# Patient Record
Sex: MALE | Race: WHITE | Employment: OTHER | URBAN - METROPOLITAN AREA
[De-identification: names, ages, dates, MRNs, and addresses within clinical notes are randomized per-mention and may not be internally consistent; named-entity substitution may affect disease eponyms.]

---

## 2017-02-16 ENCOUNTER — HOSPITAL ENCOUNTER (OUTPATIENT)
Dept: PREADMISSION TESTING | Age: 62
Discharge: HOME | End: 2017-02-16
Payer: COMMERCIAL

## 2017-02-16 VITALS
RESPIRATION RATE: 16 BRPM | SYSTOLIC BLOOD PRESSURE: 115 MMHG | HEIGHT: 69 IN | TEMPERATURE: 97.6 F | BODY MASS INDEX: 42.65 KG/M2 | DIASTOLIC BLOOD PRESSURE: 73 MMHG | WEIGHT: 288 LBS | HEART RATE: 77 BPM | OXYGEN SATURATION: 95 %

## 2017-02-16 LAB
ANION GAP SERPL CALCULATED.3IONS-SCNC: 11 MMOL/L (ref 9–17)
BUN BLDV-MCNC: 14 MG/DL (ref 8–23)
CHLORIDE BLD-SCNC: 99 MMOL/L (ref 98–107)
CO2: 30 MMOL/L (ref 20–31)
CREAT SERPL-MCNC: 0.86 MG/DL (ref 0.7–1.2)
GFR AFRICAN AMERICAN: >60 ML/MIN
GFR NON-AFRICAN AMERICAN: >60 ML/MIN
GFR SERPL CREATININE-BSD FRML MDRD: NORMAL ML/MIN/{1.73_M2}
GFR SERPL CREATININE-BSD FRML MDRD: NORMAL ML/MIN/{1.73_M2}
GLUCOSE BLD-MCNC: 144 MG/DL (ref 70–99)
POTASSIUM SERPL-SCNC: 4.2 MMOL/L (ref 3.7–5.3)
SODIUM BLD-SCNC: 140 MMOL/L (ref 135–144)

## 2017-02-16 PROCEDURE — 93005 ELECTROCARDIOGRAM TRACING: CPT

## 2017-02-16 PROCEDURE — 84520 ASSAY OF UREA NITROGEN: CPT

## 2017-02-16 PROCEDURE — 36415 COLL VENOUS BLD VENIPUNCTURE: CPT

## 2017-02-16 PROCEDURE — 82947 ASSAY GLUCOSE BLOOD QUANT: CPT

## 2017-02-16 PROCEDURE — 80051 ELECTROLYTE PANEL: CPT

## 2017-02-16 PROCEDURE — 82565 ASSAY OF CREATININE: CPT

## 2017-02-16 RX ORDER — PREDNISOLONE ACETATE 10 MG/ML
1 SUSPENSION/ DROPS OPHTHALMIC NIGHTLY
COMMUNITY

## 2017-02-16 RX ORDER — SODIUM CHLORIDE, SODIUM LACTATE, POTASSIUM CHLORIDE, CALCIUM CHLORIDE 600; 310; 30; 20 MG/100ML; MG/100ML; MG/100ML; MG/100ML
1000 INJECTION, SOLUTION INTRAVENOUS CONTINUOUS
Status: CANCELLED | OUTPATIENT
Start: 2017-02-16

## 2017-02-16 ASSESSMENT — PAIN DESCRIPTION - DESCRIPTORS: DESCRIPTORS: BURNING;CONSTANT

## 2017-02-16 ASSESSMENT — PAIN DESCRIPTION - ONSET: ONSET: ON-GOING

## 2017-02-16 ASSESSMENT — PAIN DESCRIPTION - LOCATION: LOCATION: ANKLE

## 2017-02-16 ASSESSMENT — PAIN DESCRIPTION - PROGRESSION: CLINICAL_PROGRESSION: GRADUALLY WORSENING

## 2017-02-16 ASSESSMENT — PAIN DESCRIPTION - PAIN TYPE: TYPE: CHRONIC PAIN

## 2017-02-16 ASSESSMENT — PAIN DESCRIPTION - ORIENTATION: ORIENTATION: RIGHT

## 2017-02-16 ASSESSMENT — PAIN SCALES - GENERAL: PAINLEVEL_OUTOF10: 3

## 2017-02-17 LAB
EKG ATRIAL RATE: 74 BPM
EKG P AXIS: 63 DEGREES
EKG P-R INTERVAL: 168 MS
EKG Q-T INTERVAL: 384 MS
EKG QRS DURATION: 74 MS
EKG QTC CALCULATION (BAZETT): 426 MS
EKG R AXIS: 39 DEGREES
EKG T AXIS: 27 DEGREES
EKG VENTRICULAR RATE: 74 BPM

## 2017-02-23 ENCOUNTER — HOSPITAL ENCOUNTER (OUTPATIENT)
Age: 62
Setting detail: OUTPATIENT SURGERY
Discharge: HOME | End: 2017-02-23
Attending: ORTHOPAEDIC SURGERY | Admitting: ORTHOPAEDIC SURGERY
Payer: COMMERCIAL

## 2017-02-23 ENCOUNTER — ANESTHESIA (OUTPATIENT)
Dept: OPERATING ROOM | Age: 62
End: 2017-02-23
Payer: COMMERCIAL

## 2017-02-23 ENCOUNTER — ANESTHESIA EVENT (OUTPATIENT)
Dept: OPERATING ROOM | Age: 62
End: 2017-02-23
Payer: COMMERCIAL

## 2017-02-23 VITALS — SYSTOLIC BLOOD PRESSURE: 173 MMHG | DIASTOLIC BLOOD PRESSURE: 101 MMHG | OXYGEN SATURATION: 99 % | TEMPERATURE: 97.1 F

## 2017-02-23 VITALS
BODY MASS INDEX: 41.47 KG/M2 | HEIGHT: 69 IN | RESPIRATION RATE: 13 BRPM | TEMPERATURE: 97.5 F | WEIGHT: 280 LBS | SYSTOLIC BLOOD PRESSURE: 143 MMHG | DIASTOLIC BLOOD PRESSURE: 90 MMHG | HEART RATE: 84 BPM | OXYGEN SATURATION: 91 %

## 2017-02-23 LAB — POC POTASSIUM: 4.2 MMOL/L (ref 3.5–5.1)

## 2017-02-23 PROCEDURE — 3600000004 HC SURGERY LEVEL 4 BASE: Performed by: ORTHOPAEDIC SURGERY

## 2017-02-23 PROCEDURE — 6360000002 HC RX W HCPCS: Performed by: ORTHOPAEDIC SURGERY

## 2017-02-23 PROCEDURE — 2500000003 HC RX 250 WO HCPCS: Performed by: NURSE ANESTHETIST, CERTIFIED REGISTERED

## 2017-02-23 PROCEDURE — 7100000010 HC PHASE II RECOVERY - FIRST 15 MIN: Performed by: ORTHOPAEDIC SURGERY

## 2017-02-23 PROCEDURE — 6370000000 HC RX 637 (ALT 250 FOR IP): Performed by: ANESTHESIOLOGY

## 2017-02-23 PROCEDURE — 3700000001 HC ADD 15 MINUTES (ANESTHESIA): Performed by: ORTHOPAEDIC SURGERY

## 2017-02-23 PROCEDURE — 84132 ASSAY OF SERUM POTASSIUM: CPT

## 2017-02-23 PROCEDURE — 3600000014 HC SURGERY LEVEL 4 ADDTL 15MIN: Performed by: ORTHOPAEDIC SURGERY

## 2017-02-23 PROCEDURE — 2580000003 HC RX 258: Performed by: NURSE ANESTHETIST, CERTIFIED REGISTERED

## 2017-02-23 PROCEDURE — 3700000000 HC ANESTHESIA ATTENDED CARE: Performed by: ORTHOPAEDIC SURGERY

## 2017-02-23 PROCEDURE — 6360000002 HC RX W HCPCS: Performed by: NURSE ANESTHETIST, CERTIFIED REGISTERED

## 2017-02-23 PROCEDURE — 2500000003 HC RX 250 WO HCPCS: Performed by: ORTHOPAEDIC SURGERY

## 2017-02-23 PROCEDURE — 7100000001 HC PACU RECOVERY - ADDTL 15 MIN: Performed by: ORTHOPAEDIC SURGERY

## 2017-02-23 PROCEDURE — 2580000003 HC RX 258: Performed by: ORTHOPAEDIC SURGERY

## 2017-02-23 PROCEDURE — 7100000000 HC PACU RECOVERY - FIRST 15 MIN: Performed by: ORTHOPAEDIC SURGERY

## 2017-02-23 PROCEDURE — 2720000010 HC SURG SUPPLY STERILE: Performed by: ORTHOPAEDIC SURGERY

## 2017-02-23 RX ORDER — SODIUM CHLORIDE, SODIUM LACTATE, POTASSIUM CHLORIDE, CALCIUM CHLORIDE 600; 310; 30; 20 MG/100ML; MG/100ML; MG/100ML; MG/100ML
INJECTION, SOLUTION INTRAVENOUS CONTINUOUS
Status: DISCONTINUED | OUTPATIENT
Start: 2017-02-23 | End: 2017-02-23 | Stop reason: HOSPADM

## 2017-02-23 RX ORDER — ROCURONIUM BROMIDE 10 MG/ML
INJECTION, SOLUTION INTRAVENOUS PRN
Status: DISCONTINUED | OUTPATIENT
Start: 2017-02-23 | End: 2017-02-23 | Stop reason: SDUPTHER

## 2017-02-23 RX ORDER — ONDANSETRON 2 MG/ML
INJECTION INTRAMUSCULAR; INTRAVENOUS PRN
Status: DISCONTINUED | OUTPATIENT
Start: 2017-02-23 | End: 2017-02-23 | Stop reason: SDUPTHER

## 2017-02-23 RX ORDER — ONDANSETRON 2 MG/ML
4 INJECTION INTRAMUSCULAR; INTRAVENOUS
Status: DISCONTINUED | OUTPATIENT
Start: 2017-02-23 | End: 2017-02-23 | Stop reason: HOSPADM

## 2017-02-23 RX ORDER — GLYCOPYRROLATE 0.2 MG/ML
INJECTION INTRAMUSCULAR; INTRAVENOUS PRN
Status: DISCONTINUED | OUTPATIENT
Start: 2017-02-23 | End: 2017-02-23 | Stop reason: SDUPTHER

## 2017-02-23 RX ORDER — NEOSTIGMINE METHYLSULFATE 1 MG/ML
INJECTION, SOLUTION INTRAVENOUS PRN
Status: DISCONTINUED | OUTPATIENT
Start: 2017-02-23 | End: 2017-02-23 | Stop reason: SDUPTHER

## 2017-02-23 RX ORDER — SODIUM CHLORIDE, SODIUM LACTATE, POTASSIUM CHLORIDE, CALCIUM CHLORIDE 600; 310; 30; 20 MG/100ML; MG/100ML; MG/100ML; MG/100ML
1000 INJECTION, SOLUTION INTRAVENOUS CONTINUOUS
Status: DISCONTINUED | OUTPATIENT
Start: 2017-02-23 | End: 2017-02-23 | Stop reason: SDUPTHER

## 2017-02-23 RX ORDER — MEPERIDINE HYDROCHLORIDE 50 MG/ML
12.5 INJECTION INTRAMUSCULAR; INTRAVENOUS; SUBCUTANEOUS EVERY 5 MIN PRN
Status: DISCONTINUED | OUTPATIENT
Start: 2017-02-23 | End: 2017-02-23 | Stop reason: HOSPADM

## 2017-02-23 RX ORDER — OXYCODONE HYDROCHLORIDE AND ACETAMINOPHEN 5; 325 MG/1; MG/1
2 TABLET ORAL
Status: COMPLETED | OUTPATIENT
Start: 2017-02-23 | End: 2017-02-23

## 2017-02-23 RX ORDER — SODIUM CHLORIDE, SODIUM LACTATE, POTASSIUM CHLORIDE, CALCIUM CHLORIDE 600; 310; 30; 20 MG/100ML; MG/100ML; MG/100ML; MG/100ML
INJECTION, SOLUTION INTRAVENOUS CONTINUOUS PRN
Status: DISCONTINUED | OUTPATIENT
Start: 2017-02-23 | End: 2017-02-23 | Stop reason: SDUPTHER

## 2017-02-23 RX ORDER — FENTANYL CITRATE 50 UG/ML
INJECTION, SOLUTION INTRAMUSCULAR; INTRAVENOUS PRN
Status: DISCONTINUED | OUTPATIENT
Start: 2017-02-23 | End: 2017-02-23 | Stop reason: SDUPTHER

## 2017-02-23 RX ORDER — FENTANYL CITRATE 50 UG/ML
25 INJECTION, SOLUTION INTRAMUSCULAR; INTRAVENOUS EVERY 5 MIN PRN
Status: DISCONTINUED | OUTPATIENT
Start: 2017-02-23 | End: 2017-02-23 | Stop reason: HOSPADM

## 2017-02-23 RX ORDER — MIDAZOLAM HYDROCHLORIDE 1 MG/ML
INJECTION INTRAMUSCULAR; INTRAVENOUS PRN
Status: DISCONTINUED | OUTPATIENT
Start: 2017-02-23 | End: 2017-02-23 | Stop reason: SDUPTHER

## 2017-02-23 RX ORDER — LIDOCAINE HYDROCHLORIDE 10 MG/ML
INJECTION, SOLUTION EPIDURAL; INFILTRATION; INTRACAUDAL; PERINEURAL PRN
Status: DISCONTINUED | OUTPATIENT
Start: 2017-02-23 | End: 2017-02-23 | Stop reason: SDUPTHER

## 2017-02-23 RX ORDER — MAGNESIUM HYDROXIDE 1200 MG/15ML
LIQUID ORAL CONTINUOUS PRN
Status: DISCONTINUED | OUTPATIENT
Start: 2017-02-23 | End: 2017-02-23 | Stop reason: HOSPADM

## 2017-02-23 RX ORDER — FENTANYL CITRATE 50 UG/ML
50 INJECTION, SOLUTION INTRAMUSCULAR; INTRAVENOUS EVERY 5 MIN PRN
Status: DISCONTINUED | OUTPATIENT
Start: 2017-02-23 | End: 2017-02-23 | Stop reason: HOSPADM

## 2017-02-23 RX ORDER — PROPOFOL 10 MG/ML
INJECTION, EMULSION INTRAVENOUS PRN
Status: DISCONTINUED | OUTPATIENT
Start: 2017-02-23 | End: 2017-02-23 | Stop reason: SDUPTHER

## 2017-02-23 RX ORDER — BUPIVACAINE HYDROCHLORIDE 5 MG/ML
INJECTION, SOLUTION PERINEURAL PRN
Status: DISCONTINUED | OUTPATIENT
Start: 2017-02-23 | End: 2017-02-23 | Stop reason: HOSPADM

## 2017-02-23 RX ORDER — OXYCODONE HYDROCHLORIDE AND ACETAMINOPHEN 5; 325 MG/1; MG/1
TABLET ORAL
Qty: 70 TABLET | Refills: 0 | Status: SHIPPED | OUTPATIENT
Start: 2017-02-23 | End: 2017-08-02

## 2017-02-23 RX ADMIN — ONDANSETRON 4 MG: 2 INJECTION, SOLUTION INTRAMUSCULAR; INTRAVENOUS at 13:01

## 2017-02-23 RX ADMIN — SODIUM CHLORIDE, POTASSIUM CHLORIDE, SODIUM LACTATE AND CALCIUM CHLORIDE: 600; 310; 30; 20 INJECTION, SOLUTION INTRAVENOUS at 10:59

## 2017-02-23 RX ADMIN — Medication 3 G: at 11:52

## 2017-02-23 RX ADMIN — OXYCODONE HYDROCHLORIDE AND ACETAMINOPHEN 2 TABLET: 5; 325 TABLET ORAL at 14:53

## 2017-02-23 RX ADMIN — NEOSTIGMINE METHYLSULFATE 3 MG: 1 INJECTION INTRAVENOUS at 13:01

## 2017-02-23 RX ADMIN — ROCURONIUM BROMIDE 50 MG: 10 INJECTION INTRAVENOUS at 11:48

## 2017-02-23 RX ADMIN — MIDAZOLAM HYDROCHLORIDE 2 MG: 1 INJECTION, SOLUTION INTRAMUSCULAR; INTRAVENOUS at 11:46

## 2017-02-23 RX ADMIN — LIDOCAINE HYDROCHLORIDE 50 MG: 10 INJECTION, SOLUTION EPIDURAL; INFILTRATION; INTRACAUDAL; PERINEURAL at 11:48

## 2017-02-23 RX ADMIN — GLYCOPYRROLATE 0.6 MG: 0.2 INJECTION INTRAMUSCULAR; INTRAVENOUS at 13:01

## 2017-02-23 RX ADMIN — SODIUM CHLORIDE, POTASSIUM CHLORIDE, SODIUM LACTATE AND CALCIUM CHLORIDE: 600; 310; 30; 20 INJECTION, SOLUTION INTRAVENOUS at 11:45

## 2017-02-23 RX ADMIN — PROPOFOL 200 MG: 10 INJECTION, EMULSION INTRAVENOUS at 11:48

## 2017-02-23 RX ADMIN — FENTANYL CITRATE 100 MCG: 50 INJECTION, SOLUTION INTRAMUSCULAR; INTRAVENOUS at 11:48

## 2017-02-23 ASSESSMENT — PAIN SCALES - GENERAL
PAINLEVEL_OUTOF10: 0
PAINLEVEL_OUTOF10: 0
PAINLEVEL_OUTOF10: 2
PAINLEVEL_OUTOF10: 3
PAINLEVEL_OUTOF10: 5

## 2017-02-23 ASSESSMENT — PAIN DESCRIPTION - PAIN TYPE: TYPE: SURGICAL PAIN

## 2017-02-23 ASSESSMENT — PAIN DESCRIPTION - ORIENTATION: ORIENTATION: RIGHT

## 2017-02-23 ASSESSMENT — PAIN DESCRIPTION - LOCATION: LOCATION: ANKLE

## 2017-02-23 ASSESSMENT — PAIN - FUNCTIONAL ASSESSMENT: PAIN_FUNCTIONAL_ASSESSMENT: 0-10

## 2017-03-08 ENCOUNTER — HOSPITAL ENCOUNTER (OUTPATIENT)
Dept: PHYSICAL THERAPY | Age: 62
Setting detail: THERAPIES SERIES
Discharge: HOME | End: 2017-03-08
Payer: COMMERCIAL

## 2017-03-08 PROCEDURE — 97110 THERAPEUTIC EXERCISES: CPT

## 2017-03-08 PROCEDURE — 97162 PT EVAL MOD COMPLEX 30 MIN: CPT

## 2017-03-08 ASSESSMENT — PAIN DESCRIPTION - LOCATION: LOCATION: ANKLE

## 2017-03-08 ASSESSMENT — PAIN DESCRIPTION - FREQUENCY: FREQUENCY: CONTINUOUS

## 2017-03-08 ASSESSMENT — PAIN DESCRIPTION - DESCRIPTORS: DESCRIPTORS: ACHING;DULL;CONSTANT

## 2017-03-08 ASSESSMENT — PAIN DESCRIPTION - ORIENTATION: ORIENTATION: RIGHT

## 2017-03-08 ASSESSMENT — PAIN SCALES - GENERAL: PAINLEVEL_OUTOF10: 7

## 2017-03-15 ENCOUNTER — HOSPITAL ENCOUNTER (OUTPATIENT)
Dept: PHYSICAL THERAPY | Age: 62
Setting detail: THERAPIES SERIES
Discharge: HOME | End: 2017-03-15
Payer: COMMERCIAL

## 2017-03-15 PROCEDURE — 97110 THERAPEUTIC EXERCISES: CPT

## 2017-03-15 ASSESSMENT — PAIN DESCRIPTION - FREQUENCY: FREQUENCY: CONTINUOUS

## 2017-03-15 ASSESSMENT — PAIN DESCRIPTION - LOCATION: LOCATION: ANKLE

## 2017-03-15 ASSESSMENT — PAIN DESCRIPTION - ORIENTATION: ORIENTATION: RIGHT

## 2017-03-15 ASSESSMENT — PAIN DESCRIPTION - DESCRIPTORS: DESCRIPTORS: ACHING;CONSTANT;DULL

## 2017-03-15 ASSESSMENT — PAIN SCALES - GENERAL: PAINLEVEL_OUTOF10: 3

## 2017-03-16 ENCOUNTER — HOSPITAL ENCOUNTER (OUTPATIENT)
Dept: PHYSICAL THERAPY | Age: 62
Setting detail: THERAPIES SERIES
Discharge: HOME | End: 2017-03-16
Payer: COMMERCIAL

## 2017-03-16 PROCEDURE — 97110 THERAPEUTIC EXERCISES: CPT

## 2017-03-16 ASSESSMENT — PAIN DESCRIPTION - LOCATION: LOCATION: ANKLE

## 2017-03-16 ASSESSMENT — PAIN DESCRIPTION - DESCRIPTORS: DESCRIPTORS: ACHING;CONSTANT;DULL

## 2017-03-16 ASSESSMENT — PAIN SCALES - GENERAL: PAINLEVEL_OUTOF10: 3

## 2017-03-16 ASSESSMENT — PAIN DESCRIPTION - FREQUENCY: FREQUENCY: CONTINUOUS

## 2017-03-16 ASSESSMENT — PAIN DESCRIPTION - ORIENTATION: ORIENTATION: RIGHT

## 2017-03-21 ENCOUNTER — HOSPITAL ENCOUNTER (OUTPATIENT)
Dept: PHYSICAL THERAPY | Age: 62
Setting detail: THERAPIES SERIES
Discharge: HOME | End: 2017-03-21
Payer: COMMERCIAL

## 2017-03-21 PROCEDURE — 97110 THERAPEUTIC EXERCISES: CPT

## 2017-03-21 ASSESSMENT — PAIN DESCRIPTION - FREQUENCY: FREQUENCY: CONTINUOUS

## 2017-03-21 ASSESSMENT — PAIN DESCRIPTION - LOCATION: LOCATION: ANKLE

## 2017-03-21 ASSESSMENT — PAIN DESCRIPTION - ORIENTATION: ORIENTATION: RIGHT

## 2017-03-21 ASSESSMENT — PAIN DESCRIPTION - DESCRIPTORS: DESCRIPTORS: ACHING;DULL;CONSTANT

## 2017-03-21 ASSESSMENT — PAIN SCALES - GENERAL: PAINLEVEL_OUTOF10: 3

## 2017-03-22 ENCOUNTER — HOSPITAL ENCOUNTER (OUTPATIENT)
Dept: PHYSICAL THERAPY | Age: 62
Setting detail: THERAPIES SERIES
Discharge: HOME | End: 2017-03-22
Payer: COMMERCIAL

## 2017-03-22 PROCEDURE — 97110 THERAPEUTIC EXERCISES: CPT

## 2017-03-22 ASSESSMENT — PAIN DESCRIPTION - LOCATION: LOCATION: ANKLE

## 2017-03-22 ASSESSMENT — PAIN DESCRIPTION - ORIENTATION: ORIENTATION: RIGHT

## 2017-03-22 ASSESSMENT — PAIN DESCRIPTION - DESCRIPTORS: DESCRIPTORS: ACHING;DULL;CONSTANT

## 2017-03-22 ASSESSMENT — PAIN DESCRIPTION - FREQUENCY: FREQUENCY: CONTINUOUS

## 2017-03-24 ENCOUNTER — HOSPITAL ENCOUNTER (OUTPATIENT)
Dept: PHYSICAL THERAPY | Age: 62
Setting detail: THERAPIES SERIES
Discharge: HOME | End: 2017-03-24
Payer: COMMERCIAL

## 2017-03-24 PROCEDURE — 97110 THERAPEUTIC EXERCISES: CPT

## 2017-03-24 PROCEDURE — 97164 PT RE-EVAL EST PLAN CARE: CPT

## 2017-03-24 ASSESSMENT — PAIN DESCRIPTION - DESCRIPTORS: DESCRIPTORS: ACHING;CONSTANT

## 2017-03-24 ASSESSMENT — PAIN DESCRIPTION - FREQUENCY: FREQUENCY: CONTINUOUS

## 2017-03-24 ASSESSMENT — PAIN DESCRIPTION - LOCATION: LOCATION: ANKLE

## 2017-03-24 ASSESSMENT — PAIN DESCRIPTION - ORIENTATION: ORIENTATION: RIGHT

## 2017-03-24 ASSESSMENT — PAIN SCALES - GENERAL: PAINLEVEL_OUTOF10: 3

## 2017-03-27 ENCOUNTER — HOSPITAL ENCOUNTER (OUTPATIENT)
Dept: PHYSICAL THERAPY | Age: 62
Setting detail: THERAPIES SERIES
Discharge: HOME | End: 2017-03-27
Payer: COMMERCIAL

## 2017-03-27 PROCEDURE — 97110 THERAPEUTIC EXERCISES: CPT

## 2017-03-27 ASSESSMENT — PAIN DESCRIPTION - ORIENTATION: ORIENTATION: RIGHT

## 2017-03-27 ASSESSMENT — PAIN SCALES - GENERAL: PAINLEVEL_OUTOF10: 2

## 2017-03-27 ASSESSMENT — PAIN DESCRIPTION - FREQUENCY: FREQUENCY: CONTINUOUS

## 2017-03-27 ASSESSMENT — PAIN DESCRIPTION - LOCATION: LOCATION: ANKLE

## 2017-03-27 ASSESSMENT — PAIN DESCRIPTION - DESCRIPTORS: DESCRIPTORS: ACHING;CONSTANT

## 2017-03-29 ENCOUNTER — HOSPITAL ENCOUNTER (OUTPATIENT)
Dept: PHYSICAL THERAPY | Age: 62
Setting detail: THERAPIES SERIES
Discharge: HOME | End: 2017-03-29
Payer: COMMERCIAL

## 2017-03-29 PROCEDURE — 97110 THERAPEUTIC EXERCISES: CPT

## 2017-03-29 ASSESSMENT — PAIN SCALES - GENERAL: PAINLEVEL_OUTOF10: 3

## 2017-03-29 ASSESSMENT — PAIN DESCRIPTION - DESCRIPTORS: DESCRIPTORS: ACHING;DULL;CONSTANT

## 2017-03-29 ASSESSMENT — PAIN DESCRIPTION - FREQUENCY: FREQUENCY: CONTINUOUS

## 2017-03-29 ASSESSMENT — PAIN DESCRIPTION - ORIENTATION: ORIENTATION: RIGHT

## 2017-03-29 ASSESSMENT — PAIN DESCRIPTION - LOCATION: LOCATION: ANKLE

## 2017-04-04 ENCOUNTER — HOSPITAL ENCOUNTER (OUTPATIENT)
Dept: PHYSICAL THERAPY | Age: 62
Setting detail: THERAPIES SERIES
Discharge: HOME | End: 2017-04-04
Payer: COMMERCIAL

## 2017-04-04 PROCEDURE — 97110 THERAPEUTIC EXERCISES: CPT

## 2017-04-04 ASSESSMENT — PAIN DESCRIPTION - DESCRIPTORS: DESCRIPTORS: ACHING;DULL;CONSTANT

## 2017-04-04 ASSESSMENT — PAIN SCALES - GENERAL: PAINLEVEL_OUTOF10: 2

## 2017-04-04 ASSESSMENT — PAIN DESCRIPTION - ORIENTATION: ORIENTATION: RIGHT

## 2017-04-04 ASSESSMENT — PAIN DESCRIPTION - LOCATION: LOCATION: ANKLE

## 2017-04-04 ASSESSMENT — PAIN DESCRIPTION - FREQUENCY: FREQUENCY: CONTINUOUS

## 2017-04-06 ENCOUNTER — HOSPITAL ENCOUNTER (OUTPATIENT)
Dept: PHYSICAL THERAPY | Age: 62
Setting detail: THERAPIES SERIES
Discharge: HOME | End: 2017-04-06
Payer: COMMERCIAL

## 2017-04-06 PROCEDURE — 97110 THERAPEUTIC EXERCISES: CPT

## 2017-04-06 ASSESSMENT — PAIN SCALES - GENERAL: PAINLEVEL_OUTOF10: 2

## 2017-04-06 ASSESSMENT — PAIN DESCRIPTION - LOCATION: LOCATION: ANKLE

## 2017-04-06 ASSESSMENT — PAIN DESCRIPTION - FREQUENCY: FREQUENCY: CONTINUOUS

## 2017-04-06 ASSESSMENT — PAIN DESCRIPTION - ORIENTATION: ORIENTATION: RIGHT

## 2017-04-06 ASSESSMENT — PAIN DESCRIPTION - DESCRIPTORS: DESCRIPTORS: ACHING;DULL;CONSTANT

## 2017-04-10 ENCOUNTER — HOSPITAL ENCOUNTER (OUTPATIENT)
Dept: PHYSICAL THERAPY | Age: 62
Setting detail: THERAPIES SERIES
Discharge: HOME | End: 2017-04-10
Payer: COMMERCIAL

## 2017-04-10 PROCEDURE — 97110 THERAPEUTIC EXERCISES: CPT

## 2017-04-10 ASSESSMENT — PAIN DESCRIPTION - FREQUENCY: FREQUENCY: CONTINUOUS

## 2017-04-10 ASSESSMENT — PAIN SCALES - GENERAL: PAINLEVEL_OUTOF10: 3

## 2017-04-10 ASSESSMENT — PAIN DESCRIPTION - DESCRIPTORS: DESCRIPTORS: ACHING;DULL;CONSTANT

## 2017-04-10 ASSESSMENT — PAIN DESCRIPTION - LOCATION: LOCATION: ANKLE

## 2017-04-10 ASSESSMENT — PAIN DESCRIPTION - ORIENTATION: ORIENTATION: RIGHT

## 2017-04-12 ENCOUNTER — HOSPITAL ENCOUNTER (OUTPATIENT)
Dept: PHYSICAL THERAPY | Age: 62
Setting detail: THERAPIES SERIES
Discharge: HOME | End: 2017-04-12
Payer: COMMERCIAL

## 2017-04-12 PROCEDURE — 97110 THERAPEUTIC EXERCISES: CPT

## 2017-04-12 ASSESSMENT — PAIN DESCRIPTION - LOCATION: LOCATION: ANKLE

## 2017-04-12 ASSESSMENT — PAIN SCALES - GENERAL: PAINLEVEL_OUTOF10: 2

## 2017-04-12 ASSESSMENT — PAIN DESCRIPTION - ORIENTATION: ORIENTATION: RIGHT

## 2017-04-12 ASSESSMENT — PAIN DESCRIPTION - FREQUENCY: FREQUENCY: CONTINUOUS

## 2017-04-12 ASSESSMENT — PAIN DESCRIPTION - DESCRIPTORS: DESCRIPTORS: ACHING;CONSTANT;DULL

## 2017-04-13 ENCOUNTER — HOSPITAL ENCOUNTER (OUTPATIENT)
Dept: PHYSICAL THERAPY | Age: 62
Setting detail: THERAPIES SERIES
Discharge: HOME | End: 2017-04-13
Payer: COMMERCIAL

## 2017-04-13 PROCEDURE — 97110 THERAPEUTIC EXERCISES: CPT

## 2017-04-13 ASSESSMENT — PAIN DESCRIPTION - ORIENTATION: ORIENTATION: RIGHT

## 2017-04-13 ASSESSMENT — PAIN DESCRIPTION - LOCATION: LOCATION: ANKLE

## 2017-04-13 ASSESSMENT — PAIN DESCRIPTION - FREQUENCY: FREQUENCY: CONTINUOUS

## 2017-04-13 ASSESSMENT — PAIN DESCRIPTION - DESCRIPTORS: DESCRIPTORS: ACHING;DULL;CONSTANT

## 2017-04-13 ASSESSMENT — PAIN SCALES - GENERAL: PAINLEVEL_OUTOF10: 2

## 2017-04-17 ENCOUNTER — HOSPITAL ENCOUNTER (OUTPATIENT)
Dept: PHYSICAL THERAPY | Age: 62
Setting detail: THERAPIES SERIES
Discharge: HOME | End: 2017-04-17
Payer: COMMERCIAL

## 2017-04-17 PROCEDURE — 97110 THERAPEUTIC EXERCISES: CPT

## 2017-04-17 ASSESSMENT — PAIN DESCRIPTION - DESCRIPTORS: DESCRIPTORS: ACHING;DULL;CONSTANT

## 2017-04-17 ASSESSMENT — PAIN SCALES - GENERAL: PAINLEVEL_OUTOF10: 1

## 2017-04-17 ASSESSMENT — PAIN DESCRIPTION - ORIENTATION: ORIENTATION: RIGHT

## 2017-04-17 ASSESSMENT — PAIN DESCRIPTION - LOCATION: LOCATION: ANKLE

## 2017-04-17 ASSESSMENT — PAIN DESCRIPTION - FREQUENCY: FREQUENCY: CONTINUOUS

## 2017-04-19 ENCOUNTER — HOSPITAL ENCOUNTER (OUTPATIENT)
Dept: PHYSICAL THERAPY | Age: 62
Setting detail: THERAPIES SERIES
Discharge: HOME | End: 2017-04-19
Payer: COMMERCIAL

## 2017-04-19 PROCEDURE — 97110 THERAPEUTIC EXERCISES: CPT

## 2017-04-19 ASSESSMENT — PAIN DESCRIPTION - ORIENTATION: ORIENTATION: RIGHT

## 2017-04-19 ASSESSMENT — PAIN DESCRIPTION - FREQUENCY: FREQUENCY: CONTINUOUS

## 2017-04-19 ASSESSMENT — PAIN SCALES - GENERAL: PAINLEVEL_OUTOF10: 2

## 2017-04-19 ASSESSMENT — PAIN DESCRIPTION - LOCATION: LOCATION: ANKLE

## 2017-04-19 ASSESSMENT — PAIN DESCRIPTION - DESCRIPTORS: DESCRIPTORS: ACHING;DULL;CONSTANT

## 2017-04-21 ENCOUNTER — HOSPITAL ENCOUNTER (OUTPATIENT)
Dept: PHYSICAL THERAPY | Age: 62
Setting detail: THERAPIES SERIES
Discharge: HOME | End: 2017-04-21
Payer: COMMERCIAL

## 2017-04-21 PROCEDURE — 97110 THERAPEUTIC EXERCISES: CPT

## 2017-04-21 ASSESSMENT — PAIN DESCRIPTION - DESCRIPTORS: DESCRIPTORS: ACHING;DULL;CONSTANT

## 2017-04-21 ASSESSMENT — PAIN SCALES - GENERAL: PAINLEVEL_OUTOF10: 2

## 2017-04-21 ASSESSMENT — PAIN DESCRIPTION - FREQUENCY: FREQUENCY: CONTINUOUS

## 2017-04-21 ASSESSMENT — PAIN DESCRIPTION - LOCATION: LOCATION: ANKLE

## 2017-04-21 ASSESSMENT — PAIN DESCRIPTION - ORIENTATION: ORIENTATION: RIGHT

## 2017-04-24 ENCOUNTER — HOSPITAL ENCOUNTER (OUTPATIENT)
Dept: PHYSICAL THERAPY | Age: 62
Setting detail: THERAPIES SERIES
Discharge: HOME | End: 2017-04-24
Payer: COMMERCIAL

## 2017-04-24 PROCEDURE — 97110 THERAPEUTIC EXERCISES: CPT

## 2017-04-24 ASSESSMENT — PAIN DESCRIPTION - ORIENTATION: ORIENTATION: RIGHT

## 2017-04-24 ASSESSMENT — PAIN DESCRIPTION - FREQUENCY: FREQUENCY: CONTINUOUS

## 2017-04-24 ASSESSMENT — PAIN DESCRIPTION - LOCATION: LOCATION: ANKLE

## 2017-04-24 ASSESSMENT — PAIN DESCRIPTION - DESCRIPTORS: DESCRIPTORS: ACHING;DULL;CONSTANT

## 2017-04-24 ASSESSMENT — PAIN SCALES - GENERAL: PAINLEVEL_OUTOF10: 2

## 2017-04-25 ENCOUNTER — APPOINTMENT (OUTPATIENT)
Dept: PHYSICAL THERAPY | Age: 62
End: 2017-04-25
Payer: COMMERCIAL

## 2017-04-26 ENCOUNTER — HOSPITAL ENCOUNTER (OUTPATIENT)
Dept: PHYSICAL THERAPY | Age: 62
Setting detail: THERAPIES SERIES
Discharge: HOME | End: 2017-04-26
Payer: COMMERCIAL

## 2017-04-26 PROCEDURE — 97110 THERAPEUTIC EXERCISES: CPT

## 2017-04-26 ASSESSMENT — PAIN DESCRIPTION - DESCRIPTORS: DESCRIPTORS: ACHING;DULL;CONSTANT

## 2017-04-26 ASSESSMENT — PAIN DESCRIPTION - ORIENTATION: ORIENTATION: RIGHT

## 2017-04-26 ASSESSMENT — PAIN DESCRIPTION - FREQUENCY: FREQUENCY: CONTINUOUS

## 2017-04-26 ASSESSMENT — PAIN SCALES - GENERAL: PAINLEVEL_OUTOF10: 4

## 2017-04-26 ASSESSMENT — PAIN DESCRIPTION - LOCATION: LOCATION: ANKLE

## 2017-05-02 ENCOUNTER — HOSPITAL ENCOUNTER (OUTPATIENT)
Dept: PHYSICAL THERAPY | Age: 62
Setting detail: THERAPIES SERIES
Discharge: HOME | End: 2017-05-02
Payer: COMMERCIAL

## 2017-05-02 PROCEDURE — 97110 THERAPEUTIC EXERCISES: CPT

## 2017-05-02 ASSESSMENT — PAIN DESCRIPTION - DESCRIPTORS: DESCRIPTORS: ACHING;DULL;CONSTANT

## 2017-05-02 ASSESSMENT — PAIN DESCRIPTION - LOCATION: LOCATION: ANKLE

## 2017-05-02 ASSESSMENT — PAIN SCALES - GENERAL: PAINLEVEL_OUTOF10: 2

## 2017-05-02 ASSESSMENT — PAIN DESCRIPTION - ORIENTATION: ORIENTATION: RIGHT

## 2017-05-02 ASSESSMENT — PAIN DESCRIPTION - FREQUENCY: FREQUENCY: CONTINUOUS

## 2017-05-04 ENCOUNTER — HOSPITAL ENCOUNTER (OUTPATIENT)
Dept: PHYSICAL THERAPY | Age: 62
Setting detail: THERAPIES SERIES
Discharge: HOME | End: 2017-05-04
Payer: COMMERCIAL

## 2017-05-04 PROCEDURE — 97110 THERAPEUTIC EXERCISES: CPT

## 2017-05-04 ASSESSMENT — PAIN DESCRIPTION - DESCRIPTORS: DESCRIPTORS: ACHING;DULL;CONSTANT

## 2017-05-04 ASSESSMENT — PAIN DESCRIPTION - ORIENTATION: ORIENTATION: RIGHT

## 2017-05-04 ASSESSMENT — PAIN DESCRIPTION - FREQUENCY: FREQUENCY: CONTINUOUS

## 2017-05-04 ASSESSMENT — PAIN SCALES - GENERAL: PAINLEVEL_OUTOF10: 2

## 2017-05-04 ASSESSMENT — PAIN DESCRIPTION - LOCATION: LOCATION: ANKLE

## 2017-05-05 ENCOUNTER — HOSPITAL ENCOUNTER (OUTPATIENT)
Dept: PHYSICAL THERAPY | Age: 62
Setting detail: THERAPIES SERIES
Discharge: HOME | End: 2017-05-05
Payer: COMMERCIAL

## 2017-05-05 PROCEDURE — 97110 THERAPEUTIC EXERCISES: CPT

## 2017-05-05 ASSESSMENT — PAIN DESCRIPTION - LOCATION: LOCATION: ANKLE

## 2017-05-05 ASSESSMENT — PAIN DESCRIPTION - DESCRIPTORS: DESCRIPTORS: ACHING;DULL;CONSTANT

## 2017-05-05 ASSESSMENT — PAIN DESCRIPTION - FREQUENCY: FREQUENCY: CONTINUOUS

## 2017-05-05 ASSESSMENT — PAIN SCALES - GENERAL: PAINLEVEL_OUTOF10: 3

## 2017-05-05 ASSESSMENT — PAIN DESCRIPTION - ORIENTATION: ORIENTATION: RIGHT

## 2017-05-10 ENCOUNTER — HOSPITAL ENCOUNTER (OUTPATIENT)
Dept: PHYSICAL THERAPY | Age: 62
Setting detail: THERAPIES SERIES
Discharge: HOME | End: 2017-05-10
Payer: COMMERCIAL

## 2017-05-10 PROCEDURE — 97110 THERAPEUTIC EXERCISES: CPT

## 2017-05-10 PROCEDURE — 97164 PT RE-EVAL EST PLAN CARE: CPT

## 2017-05-10 ASSESSMENT — PAIN DESCRIPTION - LOCATION: LOCATION: ANKLE

## 2017-05-10 ASSESSMENT — PAIN DESCRIPTION - FREQUENCY: FREQUENCY: CONTINUOUS

## 2017-05-10 ASSESSMENT — PAIN DESCRIPTION - ORIENTATION: ORIENTATION: RIGHT

## 2017-05-10 ASSESSMENT — PAIN DESCRIPTION - DESCRIPTORS: DESCRIPTORS: ACHING;DULL;CONSTANT

## 2017-05-10 ASSESSMENT — PAIN SCALES - GENERAL: PAINLEVEL_OUTOF10: 2

## 2017-05-12 ENCOUNTER — HOSPITAL ENCOUNTER (OUTPATIENT)
Dept: PHYSICAL THERAPY | Age: 62
Setting detail: THERAPIES SERIES
Discharge: HOME | End: 2017-05-12
Payer: COMMERCIAL

## 2017-05-12 PROCEDURE — 97110 THERAPEUTIC EXERCISES: CPT

## 2017-05-12 ASSESSMENT — PAIN DESCRIPTION - ORIENTATION: ORIENTATION: RIGHT

## 2017-05-12 ASSESSMENT — PAIN DESCRIPTION - FREQUENCY: FREQUENCY: CONTINUOUS

## 2017-05-12 ASSESSMENT — PAIN SCALES - GENERAL: PAINLEVEL_OUTOF10: 3

## 2017-05-12 ASSESSMENT — PAIN DESCRIPTION - DESCRIPTORS: DESCRIPTORS: ACHING;DULL;CONSTANT

## 2017-05-12 ASSESSMENT — PAIN DESCRIPTION - LOCATION: LOCATION: ANKLE

## 2017-05-15 ENCOUNTER — HOSPITAL ENCOUNTER (OUTPATIENT)
Dept: PHYSICAL THERAPY | Age: 62
Setting detail: THERAPIES SERIES
Discharge: HOME | End: 2017-05-15
Payer: COMMERCIAL

## 2017-05-15 PROCEDURE — 97110 THERAPEUTIC EXERCISES: CPT

## 2017-05-15 ASSESSMENT — PAIN DESCRIPTION - LOCATION: LOCATION: ANKLE

## 2017-05-15 ASSESSMENT — PAIN DESCRIPTION - FREQUENCY: FREQUENCY: CONTINUOUS

## 2017-05-15 ASSESSMENT — PAIN DESCRIPTION - ORIENTATION: ORIENTATION: RIGHT

## 2017-05-15 ASSESSMENT — PAIN DESCRIPTION - DESCRIPTORS: DESCRIPTORS: ACHING;DULL;CONSTANT

## 2017-05-15 ASSESSMENT — PAIN SCALES - GENERAL: PAINLEVEL_OUTOF10: 3

## 2017-08-11 PROBLEM — E11.9 CONTROLLED TYPE 2 DIABETES MELLITUS WITHOUT COMPLICATION, WITHOUT LONG-TERM CURRENT USE OF INSULIN (HCC): Status: ACTIVE | Noted: 2017-08-11

## 2017-08-11 PROBLEM — E11.9 CONTROLLED TYPE 2 DIABETES MELLITUS WITHOUT COMPLICATION, WITHOUT LONG-TERM CURRENT USE OF INSULIN (HCC): Chronic | Status: ACTIVE | Noted: 2017-08-11

## 2017-09-07 PROBLEM — S22.39XA CLOSED FRACTURE OF ONE RIB: Status: ACTIVE | Noted: 2017-09-07

## 2018-06-25 ENCOUNTER — OFFICE VISIT (OUTPATIENT)
Dept: ORTHOPEDIC SURGERY | Age: 63
End: 2018-06-25
Payer: COMMERCIAL

## 2018-06-25 VITALS
HEART RATE: 78 BPM | BODY MASS INDEX: 41.32 KG/M2 | SYSTOLIC BLOOD PRESSURE: 109 MMHG | DIASTOLIC BLOOD PRESSURE: 70 MMHG | WEIGHT: 279 LBS | HEIGHT: 69 IN

## 2018-06-25 DIAGNOSIS — M19.071 ARTHRITIS OF RIGHT ANKLE: Primary | ICD-10-CM

## 2018-06-25 PROCEDURE — 99213 OFFICE O/P EST LOW 20 MIN: CPT | Performed by: ORTHOPAEDIC SURGERY

## 2018-11-05 ENCOUNTER — OFFICE VISIT (OUTPATIENT)
Dept: ORTHOPEDIC SURGERY | Age: 63
End: 2018-11-05
Payer: COMMERCIAL

## 2018-11-05 VITALS
HEART RATE: 76 BPM | HEIGHT: 69 IN | SYSTOLIC BLOOD PRESSURE: 138 MMHG | WEIGHT: 286.4 LBS | BODY MASS INDEX: 42.42 KG/M2 | DIASTOLIC BLOOD PRESSURE: 90 MMHG

## 2018-11-05 DIAGNOSIS — M17.12 ARTHRITIS OF LEFT KNEE: Primary | ICD-10-CM

## 2018-11-05 DIAGNOSIS — M25.562 LEFT KNEE PAIN, UNSPECIFIED CHRONICITY: ICD-10-CM

## 2018-11-05 PROBLEM — Z95.0 PRESENCE OF CARDIAC PACEMAKER: Status: ACTIVE | Noted: 2017-01-16

## 2018-11-05 PROBLEM — G47.33 OSA (OBSTRUCTIVE SLEEP APNEA): Status: ACTIVE | Noted: 2018-09-14

## 2018-11-05 PROCEDURE — 3017F COLORECTAL CA SCREEN DOC REV: CPT | Performed by: ORTHOPAEDIC SURGERY

## 2018-11-05 PROCEDURE — G8417 CALC BMI ABV UP PARAM F/U: HCPCS | Performed by: ORTHOPAEDIC SURGERY

## 2018-11-05 PROCEDURE — 20610 DRAIN/INJ JOINT/BURSA W/O US: CPT | Performed by: ORTHOPAEDIC SURGERY

## 2018-11-05 PROCEDURE — 99213 OFFICE O/P EST LOW 20 MIN: CPT | Performed by: ORTHOPAEDIC SURGERY

## 2018-11-05 PROCEDURE — G8484 FLU IMMUNIZE NO ADMIN: HCPCS | Performed by: ORTHOPAEDIC SURGERY

## 2018-11-05 PROCEDURE — G8427 DOCREV CUR MEDS BY ELIG CLIN: HCPCS | Performed by: ORTHOPAEDIC SURGERY

## 2018-11-05 PROCEDURE — 4040F PNEUMOC VAC/ADMIN/RCVD: CPT | Performed by: ORTHOPAEDIC SURGERY

## 2018-11-05 PROCEDURE — 1101F PT FALLS ASSESS-DOCD LE1/YR: CPT | Performed by: ORTHOPAEDIC SURGERY

## 2018-11-05 PROCEDURE — 1036F TOBACCO NON-USER: CPT | Performed by: ORTHOPAEDIC SURGERY

## 2018-11-05 PROCEDURE — 1123F ACP DISCUSS/DSCN MKR DOCD: CPT | Performed by: ORTHOPAEDIC SURGERY

## 2018-11-05 RX ORDER — FLUTICASONE PROPIONATE 50 MCG
50 SPRAY, SUSPENSION (ML) NASAL
COMMUNITY
End: 2020-05-19

## 2018-11-05 RX ORDER — VITAMIN B COMPLEX
1 TABLET ORAL
COMMUNITY

## 2018-11-07 RX ORDER — BETAMETHASONE SODIUM PHOSPHATE AND BETAMETHASONE ACETATE 3; 3 MG/ML; MG/ML
12 INJECTION, SUSPENSION INTRA-ARTICULAR; INTRALESIONAL; INTRAMUSCULAR; SOFT TISSUE ONCE
Status: COMPLETED | OUTPATIENT
Start: 2018-11-07 | End: 2018-11-08

## 2018-11-07 RX ORDER — BUPIVACAINE HYDROCHLORIDE 5 MG/ML
30 INJECTION, SOLUTION EPIDURAL; INTRACAUDAL ONCE
Status: COMPLETED | OUTPATIENT
Start: 2018-11-07 | End: 2018-11-08

## 2018-11-08 RX ADMIN — BETAMETHASONE SODIUM PHOSPHATE AND BETAMETHASONE ACETATE 12 MG: 3; 3 INJECTION, SUSPENSION INTRA-ARTICULAR; INTRALESIONAL; INTRAMUSCULAR; SOFT TISSUE at 09:24

## 2018-11-08 RX ADMIN — BUPIVACAINE HYDROCHLORIDE 150 MG: 5 INJECTION, SOLUTION EPIDURAL; INTRACAUDAL at 09:25

## 2018-11-19 ENCOUNTER — OFFICE VISIT (OUTPATIENT)
Dept: ORTHOPEDIC SURGERY | Age: 63
End: 2018-11-19
Payer: COMMERCIAL

## 2018-11-19 VITALS
DIASTOLIC BLOOD PRESSURE: 85 MMHG | WEIGHT: 283.4 LBS | BODY MASS INDEX: 41.98 KG/M2 | HEIGHT: 69 IN | SYSTOLIC BLOOD PRESSURE: 128 MMHG | HEART RATE: 67 BPM

## 2018-11-19 DIAGNOSIS — M17.10 ARTHRITIS OF KNEE: Primary | ICD-10-CM

## 2018-11-19 PROCEDURE — G8484 FLU IMMUNIZE NO ADMIN: HCPCS | Performed by: ORTHOPAEDIC SURGERY

## 2018-11-19 PROCEDURE — 3017F COLORECTAL CA SCREEN DOC REV: CPT | Performed by: ORTHOPAEDIC SURGERY

## 2018-11-19 PROCEDURE — 4040F PNEUMOC VAC/ADMIN/RCVD: CPT | Performed by: ORTHOPAEDIC SURGERY

## 2018-11-19 PROCEDURE — 1101F PT FALLS ASSESS-DOCD LE1/YR: CPT | Performed by: ORTHOPAEDIC SURGERY

## 2018-11-19 PROCEDURE — 1123F ACP DISCUSS/DSCN MKR DOCD: CPT | Performed by: ORTHOPAEDIC SURGERY

## 2018-11-19 PROCEDURE — 1036F TOBACCO NON-USER: CPT | Performed by: ORTHOPAEDIC SURGERY

## 2018-11-19 PROCEDURE — G8427 DOCREV CUR MEDS BY ELIG CLIN: HCPCS | Performed by: ORTHOPAEDIC SURGERY

## 2018-11-19 PROCEDURE — G8417 CALC BMI ABV UP PARAM F/U: HCPCS | Performed by: ORTHOPAEDIC SURGERY

## 2018-11-19 PROCEDURE — 99213 OFFICE O/P EST LOW 20 MIN: CPT | Performed by: ORTHOPAEDIC SURGERY

## 2019-01-07 ENCOUNTER — OFFICE VISIT (OUTPATIENT)
Dept: ORTHOPEDIC SURGERY | Age: 64
End: 2019-01-07
Payer: COMMERCIAL

## 2019-01-07 VITALS — BODY MASS INDEX: 42.09 KG/M2 | HEIGHT: 69 IN | WEIGHT: 284.2 LBS

## 2019-01-07 DIAGNOSIS — M19.071 ARTHRITIS OF RIGHT ANKLE: Primary | ICD-10-CM

## 2019-01-07 PROCEDURE — 4040F PNEUMOC VAC/ADMIN/RCVD: CPT | Performed by: ORTHOPAEDIC SURGERY

## 2019-01-07 PROCEDURE — 3017F COLORECTAL CA SCREEN DOC REV: CPT | Performed by: ORTHOPAEDIC SURGERY

## 2019-01-07 PROCEDURE — 1036F TOBACCO NON-USER: CPT | Performed by: ORTHOPAEDIC SURGERY

## 2019-01-07 PROCEDURE — 1123F ACP DISCUSS/DSCN MKR DOCD: CPT | Performed by: ORTHOPAEDIC SURGERY

## 2019-01-07 PROCEDURE — G8417 CALC BMI ABV UP PARAM F/U: HCPCS | Performed by: ORTHOPAEDIC SURGERY

## 2019-01-07 PROCEDURE — 99213 OFFICE O/P EST LOW 20 MIN: CPT | Performed by: ORTHOPAEDIC SURGERY

## 2019-01-07 PROCEDURE — G8484 FLU IMMUNIZE NO ADMIN: HCPCS | Performed by: ORTHOPAEDIC SURGERY

## 2019-01-07 PROCEDURE — G8427 DOCREV CUR MEDS BY ELIG CLIN: HCPCS | Performed by: ORTHOPAEDIC SURGERY

## 2019-01-07 PROCEDURE — 1101F PT FALLS ASSESS-DOCD LE1/YR: CPT | Performed by: ORTHOPAEDIC SURGERY

## 2019-02-07 DIAGNOSIS — M25.552 LEFT HIP PAIN: Primary | ICD-10-CM

## 2019-02-12 ENCOUNTER — OFFICE VISIT (OUTPATIENT)
Dept: ORTHOPEDIC SURGERY | Age: 64
End: 2019-02-12
Payer: COMMERCIAL

## 2019-02-12 ENCOUNTER — PROCEDURE VISIT (OUTPATIENT)
Dept: ORTHOPEDIC SURGERY | Age: 64
End: 2019-02-12
Payer: COMMERCIAL

## 2019-02-12 VITALS
HEIGHT: 69 IN | WEIGHT: 275 LBS | TEMPERATURE: 48.2 F | SYSTOLIC BLOOD PRESSURE: 126 MMHG | HEART RATE: 68 BPM | DIASTOLIC BLOOD PRESSURE: 81 MMHG | BODY MASS INDEX: 40.73 KG/M2

## 2019-02-12 DIAGNOSIS — M16.12 PRIMARY OSTEOARTHRITIS OF LEFT HIP: Primary | ICD-10-CM

## 2019-02-12 DIAGNOSIS — M25.552 LEFT HIP PAIN: Primary | ICD-10-CM

## 2019-02-12 PROCEDURE — G8427 DOCREV CUR MEDS BY ELIG CLIN: HCPCS | Performed by: FAMILY MEDICINE

## 2019-02-12 PROCEDURE — 1036F TOBACCO NON-USER: CPT | Performed by: FAMILY MEDICINE

## 2019-02-12 PROCEDURE — G8427 DOCREV CUR MEDS BY ELIG CLIN: HCPCS | Performed by: ORTHOPAEDIC SURGERY

## 2019-02-12 PROCEDURE — 4040F PNEUMOC VAC/ADMIN/RCVD: CPT | Performed by: FAMILY MEDICINE

## 2019-02-12 PROCEDURE — 3017F COLORECTAL CA SCREEN DOC REV: CPT | Performed by: FAMILY MEDICINE

## 2019-02-12 PROCEDURE — G8417 CALC BMI ABV UP PARAM F/U: HCPCS | Performed by: FAMILY MEDICINE

## 2019-02-12 PROCEDURE — 1101F PT FALLS ASSESS-DOCD LE1/YR: CPT | Performed by: ORTHOPAEDIC SURGERY

## 2019-02-12 PROCEDURE — 1123F ACP DISCUSS/DSCN MKR DOCD: CPT | Performed by: FAMILY MEDICINE

## 2019-02-12 PROCEDURE — 1101F PT FALLS ASSESS-DOCD LE1/YR: CPT | Performed by: FAMILY MEDICINE

## 2019-02-12 PROCEDURE — 4040F PNEUMOC VAC/ADMIN/RCVD: CPT | Performed by: ORTHOPAEDIC SURGERY

## 2019-02-12 PROCEDURE — 99213 OFFICE O/P EST LOW 20 MIN: CPT | Performed by: FAMILY MEDICINE

## 2019-02-12 PROCEDURE — 1036F TOBACCO NON-USER: CPT | Performed by: ORTHOPAEDIC SURGERY

## 2019-02-12 PROCEDURE — G8417 CALC BMI ABV UP PARAM F/U: HCPCS | Performed by: ORTHOPAEDIC SURGERY

## 2019-02-12 PROCEDURE — G8484 FLU IMMUNIZE NO ADMIN: HCPCS | Performed by: FAMILY MEDICINE

## 2019-02-12 PROCEDURE — G8484 FLU IMMUNIZE NO ADMIN: HCPCS | Performed by: ORTHOPAEDIC SURGERY

## 2019-02-12 PROCEDURE — 1123F ACP DISCUSS/DSCN MKR DOCD: CPT | Performed by: ORTHOPAEDIC SURGERY

## 2019-02-12 PROCEDURE — 99213 OFFICE O/P EST LOW 20 MIN: CPT | Performed by: ORTHOPAEDIC SURGERY

## 2019-02-12 PROCEDURE — 20611 DRAIN/INJ JOINT/BURSA W/US: CPT | Performed by: FAMILY MEDICINE

## 2019-02-12 PROCEDURE — 3017F COLORECTAL CA SCREEN DOC REV: CPT | Performed by: ORTHOPAEDIC SURGERY

## 2019-02-12 RX ORDER — BUPIVACAINE HYDROCHLORIDE 5 MG/ML
1 INJECTION, SOLUTION PERINEURAL ONCE
Status: COMPLETED | OUTPATIENT
Start: 2019-02-12 | End: 2019-02-13

## 2019-02-12 RX ORDER — TRIAMCINOLONE ACETONIDE 40 MG/ML
40 INJECTION, SUSPENSION INTRA-ARTICULAR; INTRAMUSCULAR ONCE
Status: COMPLETED | OUTPATIENT
Start: 2019-02-12 | End: 2019-02-13

## 2019-02-13 RX ADMIN — TRIAMCINOLONE ACETONIDE 40 MG: 40 INJECTION, SUSPENSION INTRA-ARTICULAR; INTRAMUSCULAR at 07:37

## 2019-02-13 RX ADMIN — BUPIVACAINE HYDROCHLORIDE 5 MG: 5 INJECTION, SOLUTION PERINEURAL at 07:36

## 2019-02-21 RX ORDER — TRIAMCINOLONE ACETONIDE 40 MG/ML
40 INJECTION, SUSPENSION INTRA-ARTICULAR; INTRAMUSCULAR ONCE
Status: COMPLETED | OUTPATIENT
Start: 2019-02-21 | End: 2019-02-21

## 2019-02-21 RX ORDER — BUPIVACAINE HYDROCHLORIDE 5 MG/ML
1 INJECTION, SOLUTION PERINEURAL ONCE
Status: COMPLETED | OUTPATIENT
Start: 2019-02-21 | End: 2019-02-21

## 2019-02-21 RX ADMIN — TRIAMCINOLONE ACETONIDE 40 MG: 40 INJECTION, SUSPENSION INTRA-ARTICULAR; INTRAMUSCULAR at 15:37

## 2019-02-21 RX ADMIN — BUPIVACAINE HYDROCHLORIDE 5 MG: 5 INJECTION, SOLUTION PERINEURAL at 15:36

## 2019-02-26 ENCOUNTER — OFFICE VISIT (OUTPATIENT)
Dept: ORTHOPEDIC SURGERY | Age: 64
End: 2019-02-26
Payer: COMMERCIAL

## 2019-02-26 VITALS
SYSTOLIC BLOOD PRESSURE: 139 MMHG | WEIGHT: 275 LBS | BODY MASS INDEX: 40.73 KG/M2 | HEIGHT: 69 IN | HEART RATE: 77 BPM | DIASTOLIC BLOOD PRESSURE: 90 MMHG

## 2019-02-26 DIAGNOSIS — M16.12 ARTHRITIS OF LEFT HIP: Primary | ICD-10-CM

## 2019-02-26 PROCEDURE — 3017F COLORECTAL CA SCREEN DOC REV: CPT | Performed by: ORTHOPAEDIC SURGERY

## 2019-02-26 PROCEDURE — G8427 DOCREV CUR MEDS BY ELIG CLIN: HCPCS | Performed by: ORTHOPAEDIC SURGERY

## 2019-02-26 PROCEDURE — 1101F PT FALLS ASSESS-DOCD LE1/YR: CPT | Performed by: ORTHOPAEDIC SURGERY

## 2019-02-26 PROCEDURE — G8417 CALC BMI ABV UP PARAM F/U: HCPCS | Performed by: ORTHOPAEDIC SURGERY

## 2019-02-26 PROCEDURE — 1123F ACP DISCUSS/DSCN MKR DOCD: CPT | Performed by: ORTHOPAEDIC SURGERY

## 2019-02-26 PROCEDURE — 4040F PNEUMOC VAC/ADMIN/RCVD: CPT | Performed by: ORTHOPAEDIC SURGERY

## 2019-02-26 PROCEDURE — 99213 OFFICE O/P EST LOW 20 MIN: CPT | Performed by: ORTHOPAEDIC SURGERY

## 2019-02-26 PROCEDURE — G8484 FLU IMMUNIZE NO ADMIN: HCPCS | Performed by: ORTHOPAEDIC SURGERY

## 2019-02-26 PROCEDURE — 1036F TOBACCO NON-USER: CPT | Performed by: ORTHOPAEDIC SURGERY

## 2019-03-05 PROBLEM — J06.9 VIRAL URI: Status: ACTIVE | Noted: 2019-03-05

## 2019-04-26 PROBLEM — E11.65 UNCONTROLLED TYPE 2 DIABETES MELLITUS WITH HYPERGLYCEMIA (HCC): Status: ACTIVE | Noted: 2019-04-26

## 2019-07-10 ENCOUNTER — OFFICE VISIT (OUTPATIENT)
Dept: ORTHOPEDIC SURGERY | Age: 64
End: 2019-07-10
Payer: COMMERCIAL

## 2019-07-10 VITALS
WEIGHT: 266 LBS | HEIGHT: 69 IN | HEART RATE: 72 BPM | SYSTOLIC BLOOD PRESSURE: 94 MMHG | DIASTOLIC BLOOD PRESSURE: 66 MMHG | BODY MASS INDEX: 39.4 KG/M2

## 2019-07-10 DIAGNOSIS — M16.12 ARTHRITIS OF LEFT HIP: Primary | ICD-10-CM

## 2019-07-10 PROCEDURE — 3017F COLORECTAL CA SCREEN DOC REV: CPT | Performed by: ORTHOPAEDIC SURGERY

## 2019-07-10 PROCEDURE — G8427 DOCREV CUR MEDS BY ELIG CLIN: HCPCS | Performed by: ORTHOPAEDIC SURGERY

## 2019-07-10 PROCEDURE — 99213 OFFICE O/P EST LOW 20 MIN: CPT | Performed by: ORTHOPAEDIC SURGERY

## 2019-07-10 PROCEDURE — G8417 CALC BMI ABV UP PARAM F/U: HCPCS | Performed by: ORTHOPAEDIC SURGERY

## 2019-07-10 PROCEDURE — 1123F ACP DISCUSS/DSCN MKR DOCD: CPT | Performed by: ORTHOPAEDIC SURGERY

## 2019-07-10 PROCEDURE — 4040F PNEUMOC VAC/ADMIN/RCVD: CPT | Performed by: ORTHOPAEDIC SURGERY

## 2019-07-10 PROCEDURE — 1036F TOBACCO NON-USER: CPT | Performed by: ORTHOPAEDIC SURGERY

## 2019-07-10 NOTE — PROGRESS NOTES
Disease Other         paternal history    Cancer Mother         melanoma    Kidney Cancer Father     Heart Attack Father     High Blood Pressure Father     Heart Disease Brother     Cancer Sister      Social History     Tobacco Use    Smoking status: Former Smoker     Packs/day: 1.50     Years: 20.00     Pack years: 30.00     Types: Cigarettes     Start date:      Last attempt to quit:      Years since quittin.5    Smokeless tobacco: Former User    Tobacco comment: quit    Substance Use Topics    Alcohol use: No     Alcohol/week: 0.0 oz    Drug use: No       Objective:     Vitals:    07/10/19 1000   BP: 94/66   Pulse: 72   Weight: 266 lb (120.7 kg)   Height: 5' 9\" (1.753 m)     Physical Exam  On exam the patient is alert and oriented x3 and appears well kempt he walks with a normal gait. Exam of the left hip shows flexion to 100 degrees. There is some pain on internal and external rotation. No signs of instability. No pain over the greater trochanter. Negative straight leg raise. Radiology:            Impression:        Assessment:     Visit Diagnoses       Codes    Arthritis of left hip    -  Primary M16.12           Plan:     The patient is thinking about possible total hip after the first of the year. I told him if he could give us about a month in advance notice. If he does decide to do the hip I would like to see him back so we can talk more about surgery and recovery.      Please be aware that portions of this chart note were created using voice recognition software and that unforseen errors may have occured   Electronically signed by Bijan Juan MD on 7/10/2019 at 10:27 AM

## 2019-07-17 ENCOUNTER — OFFICE VISIT (OUTPATIENT)
Dept: ORTHOPEDIC SURGERY | Age: 64
End: 2019-07-17
Payer: COMMERCIAL

## 2019-07-17 VITALS
SYSTOLIC BLOOD PRESSURE: 106 MMHG | DIASTOLIC BLOOD PRESSURE: 71 MMHG | BODY MASS INDEX: 39.1 KG/M2 | HEIGHT: 69 IN | HEART RATE: 74 BPM | WEIGHT: 264 LBS

## 2019-07-17 DIAGNOSIS — G89.29 CHRONIC PAIN OF RIGHT ANKLE: Primary | ICD-10-CM

## 2019-07-17 DIAGNOSIS — M25.571 CHRONIC PAIN OF RIGHT ANKLE: Primary | ICD-10-CM

## 2019-07-17 PROCEDURE — 99213 OFFICE O/P EST LOW 20 MIN: CPT | Performed by: ORTHOPAEDIC SURGERY

## 2020-01-22 ENCOUNTER — OFFICE VISIT (OUTPATIENT)
Dept: ORTHOPEDIC SURGERY | Age: 65
End: 2020-01-22
Payer: COMMERCIAL

## 2020-01-22 PROCEDURE — 99213 OFFICE O/P EST LOW 20 MIN: CPT | Performed by: ORTHOPAEDIC SURGERY

## 2020-01-22 NOTE — PROGRESS NOTES
Subjective:      Patient ID: Vielka Suarez is a 75 y.o. male. HPI  She comes in today for evaluation of his left hip. Has continued pain more posterior in the hip. Worse with weightbearing. He does have some pain if he is been sitting for a while and then goes to get up. He feels the pain is worsening. Current Outpatient Medications   Medication Sig Dispense Refill    pantoprazole (PROTONIX) 40 MG tablet TAKE 1 TABLET DAILY 90 tablet 3    atorvastatin (LIPITOR) 80 MG tablet TAKE 1 TABLET DAILY 90 tablet 3    SITagliptin-metFORMIN (JANUMET XR)  MG TB24 per extended release tablet Take 1 tablet by mouth every other day 180 tablet 3    telmisartan (MICARDIS) 40 MG tablet TAKE 1 TABLET DAILY 90 tablet 3    hydrochlorothiazide (HYDRODIURIL) 25 MG tablet TAKE 1 TABLET DAILY 90 tablet 3    meloxicam (MOBIC) 15 MG tablet TAKE 1 TABLET DAILY 90 tablet 3    B Complex Vitamins (VITAMIN-B COMPLEX) TABS Take 1 tablet by mouth      fluticasone (FLONASE) 50 MCG/ACT nasal spray 50 mcg by Nasal route      prednisoLONE acetate (PRED FORTE) 1 % ophthalmic suspension Place 1 drop into the right eye nightly       Multiple Vitamins-Minerals (THERAPEUTIC MULTIVITAMIN-MINERALS) tablet Take 1 tablet by mouth daily      sertraline (ZOLOFT) 100 MG tablet Take 100 mg by mouth nightly Indications: Depression       loratadine (CLARITIN) 10 MG tablet Take 1 tablet by mouth daily (Patient taking differently: Take 10 mg by mouth daily as needed prn) 30 tablet 11    fluticasone (FLONASE) 50 MCG/ACT nasal spray 2 sprays by Nasal route daily (Patient taking differently: 2 sprays by Nasal route nightly as needed ) 1 Bottle 11     No current facility-administered medications for this visit.       Facility-Administered Medications Ordered in Other Visits   Medication Dose Route Frequency Provider Last Rate Last Dose    clindamycin (CLEOCIN) 900 mg in dextrose 5% 50 mL IVPB  900 mg Intravenous Once Shanelle Rogers MD Family History   Problem Relation Age of Onset    Asthma Sister     Heart Disease Other         paternal history    Cancer Mother         melanoma    Kidney Cancer Father     Heart Attack Father     High Blood Pressure Father     Heart Disease Brother     Cancer Sister      Social History     Tobacco Use    Smoking status: Former Smoker     Packs/day: 1.50     Years: 20.00     Pack years: 30.00     Types: Cigarettes     Start date:      Last attempt to quit:      Years since quittin.0    Smokeless tobacco: Former User    Tobacco comment: quit    Substance Use Topics    Alcohol use: No     Alcohol/week: 0.0 standard drinks    Drug use: No       Objective: There were no vitals filed for this visit. Physical Exam  Examination the patient is alert and oriented x3 and appears well kempt he does walk with a slight limp. Exam of the left hip shows flexion to 100 degrees without pain. Minimal pain on internal and external rotation both of these movements are well-preserved. No pain over the greater trochanter. More tenderness posteriorly over the muscle. Negative straight leg raise. No obvious motor or sensory deficits. Radiology:            Impression:        Assessment:     Visit Diagnoses       Codes    Hip pain, right    -  Primary M25.551    Hip pain, left     M25.552           Plan:     I discussed with the patient by x-ray he really has minimal arthritic changes. I were continuing in his care I would consider injection of the left hip for diagnostic purposes. Discussed with him I no longer have privileges at a hospital that covers his insurance. I would like Dr. Anna Corcoran to take over care regarding his hip and direct further treatment.      Please be aware that portions of this chart note were created using voice recognition software and that unforseen errors may have occured   Electronically signed by Jose Alston MD on 2020 at 10:33 AM

## 2020-01-29 ENCOUNTER — OFFICE VISIT (OUTPATIENT)
Dept: ORTHOPEDIC SURGERY | Age: 65
End: 2020-01-29
Payer: COMMERCIAL

## 2020-01-29 VITALS — HEIGHT: 69 IN | WEIGHT: 275.35 LBS | BODY MASS INDEX: 40.78 KG/M2

## 2020-01-29 PROCEDURE — 99213 OFFICE O/P EST LOW 20 MIN: CPT | Performed by: ORTHOPAEDIC SURGERY

## 2020-01-29 NOTE — PROGRESS NOTES
Bill Colbert MD        lactated ringers infusion 1,000 mL  1,000 mL Intravenous Continuous Jovani Castellano MD 50 mL/hr at 03/10/16 1334 1,000 mL at 03/10/16 1334    sodium chloride flush 0.9 % injection 10 mL  10 mL Intravenous 2 times per day Saritha Cid MD        sodium chloride flush 0.9 % injection 10 mL  10 mL Intravenous PRN Thien Zaldivar MD        fentaNYL (SUBLIMAZE) injection 25 mcg  25 mcg Intravenous Q5 Min PRN Thien Zaldivar MD        ropivacaine 0.2% (NAROPIN) elastomeric infusion 550 mL  550 mL Infiltration Continuous Saritha Cid MD 8 mL/hr at 03/10/16 1229 550 mL at 03/10/16 1229     Review of Systems   Musculoskeletal: Positive for arthralgias and myalgias. Past Medical History:   Diagnosis Date    Allergic rhinitis     Anxiety     Arrhythmia     Arthritis     Atrial fibrillation (HCC)     on Pacemaker, No Blood Thinner, 2 Ablation    Benign hypertension 5/26/2015    Caffeine use     3 cups coffee day    Depression     Diabetes mellitus (HCC)     GERD (gastroesophageal reflux disease)     Hyperlipidemia LDL goal < 100 5/26/2015    Hypertrophy of prostate with urinary obstruction and other lower urinary tract symptoms (LUTS)     Morbid obesity ([address] 5/26/2015    Mumps     as a child    Unspecified sleep apnea     bi pap    Wears glasses      Past Surgical History:   Procedure Laterality Date    ANKLE ARTHROSCOPY Right 02/23/2017    WITH DEBRIDEMENT     ANKLE ARTHROSCOPY Right 2/23/2017    ANKLE ARTHROSCOPY WITH DEBRIDEMENT  performed by Bill Colbert MD at [facility]  2012 ?     [facility]    COLONOSCOPY      CORNEAL TRANSPLANT Right multiple    corneal transplant x3    CYSTOSCOPY      TURBT 2010    ELBOW SURGERY Right 1967    FOOT SURGERY Right 07/14/2016    remove hardware--2 screws    PACEMAKER PLACEMENT  12/23/2016    [facility], [phone], Dr. Orman Dakins Right 2015    VASECTOMY       Family History   Problem Relation Age of Onset    Asthma Sister     Heart Disease Other         paternal history    Cancer Mother         melanoma    Kidney Cancer Father     Heart Attack Father     High Blood Pressure Father     Heart Disease Brother     Cancer Sister      Social History     Tobacco Use    Smoking status: Former Smoker     Packs/day: 1.50     Years: 20.00     Pack years: 30.00     Types: Cigarettes     Start date:      Last attempt to quit:      Years since quittin.0    Smokeless tobacco: Former User    Tobacco comment: quit    Substance Use Topics    Alcohol use: No     Alcohol/week: 0.0 standard drinks    Drug use: No       Objective:     Vitals:    20 1029   Weight: 275 lb 5.7 oz (124.9 kg)   Height: 5' 9\" (1.753 m)     Physical Exam  On examination patient is alert and oriented x3 and appears well kempt he walks with a normal gait. Right ankle exam shows tenderness across the ankle joint itself. No effusion. Ankle range of motion shows dorsiflexion 5 plantarflexion 10 degrees. Clinically solid subtalar fusion. Brisk refill. Radiology:            Impression:        Assessment:     Visit Diagnoses       Codes    Arthritis of right ankle    -  Primary M19.071           Plan:     Patient continues to do reasonably well with his right foot. He will follow-up in 6 months time.   If he is having problems before then he will let us know     Please be aware that portions of this chart note were created using voice recognition software and that unforseen errors may have occured   Electronically signed by Woody Poon MD on 2020 at 10:32 AM

## 2020-02-03 ENCOUNTER — OFFICE VISIT (OUTPATIENT)
Dept: ORTHOPEDIC SURGERY | Age: 65
End: 2020-02-03
Payer: COMMERCIAL

## 2020-02-03 PROCEDURE — G8482 FLU IMMUNIZE ORDER/ADMIN: HCPCS | Performed by: ORTHOPAEDIC SURGERY

## 2020-02-03 PROCEDURE — 1036F TOBACCO NON-USER: CPT | Performed by: ORTHOPAEDIC SURGERY

## 2020-02-03 PROCEDURE — 1123F ACP DISCUSS/DSCN MKR DOCD: CPT | Performed by: ORTHOPAEDIC SURGERY

## 2020-02-03 PROCEDURE — G8417 CALC BMI ABV UP PARAM F/U: HCPCS | Performed by: ORTHOPAEDIC SURGERY

## 2020-02-03 PROCEDURE — 3017F COLORECTAL CA SCREEN DOC REV: CPT | Performed by: ORTHOPAEDIC SURGERY

## 2020-02-03 PROCEDURE — 20610 DRAIN/INJ JOINT/BURSA W/O US: CPT | Performed by: ORTHOPAEDIC SURGERY

## 2020-02-03 PROCEDURE — 4040F PNEUMOC VAC/ADMIN/RCVD: CPT | Performed by: ORTHOPAEDIC SURGERY

## 2020-02-03 PROCEDURE — G8428 CUR MEDS NOT DOCUMENT: HCPCS | Performed by: ORTHOPAEDIC SURGERY

## 2020-02-03 PROCEDURE — 99213 OFFICE O/P EST LOW 20 MIN: CPT | Performed by: ORTHOPAEDIC SURGERY

## 2020-02-03 RX ORDER — BETAMETHASONE SODIUM PHOSPHATE AND BETAMETHASONE ACETATE 3; 3 MG/ML; MG/ML
12 INJECTION, SUSPENSION INTRA-ARTICULAR; INTRALESIONAL; INTRAMUSCULAR; SOFT TISSUE ONCE
Status: COMPLETED | OUTPATIENT
Start: 2020-02-03 | End: 2020-02-03

## 2020-02-03 RX ORDER — BUPIVACAINE HYDROCHLORIDE 5 MG/ML
2 INJECTION, SOLUTION PERINEURAL ONCE
Status: COMPLETED | OUTPATIENT
Start: 2020-02-03 | End: 2020-02-03

## 2020-02-03 RX ORDER — LIDOCAINE HYDROCHLORIDE 10 MG/ML
2 INJECTION, SOLUTION EPIDURAL; INFILTRATION; INTRACAUDAL; PERINEURAL ONCE
Status: COMPLETED | OUTPATIENT
Start: 2020-02-03 | End: 2020-02-03

## 2020-02-03 RX ADMIN — BETAMETHASONE SODIUM PHOSPHATE AND BETAMETHASONE ACETATE 12 MG: 3; 3 INJECTION, SUSPENSION INTRA-ARTICULAR; INTRALESIONAL; INTRAMUSCULAR; SOFT TISSUE at 16:26

## 2020-02-03 RX ADMIN — LIDOCAINE HYDROCHLORIDE 2 ML: 10 INJECTION, SOLUTION EPIDURAL; INFILTRATION; INTRACAUDAL; PERINEURAL at 16:27

## 2020-02-03 RX ADMIN — BUPIVACAINE HYDROCHLORIDE 10 MG: 5 INJECTION, SOLUTION PERINEURAL at 16:27

## 2020-02-03 NOTE — PROGRESS NOTES
greater trochanter more posterior. Neurological: Patient is alert and oriented to person, place, and time. Normal strenght. No sensory deficit. Skin: Skin is warm and dry  Psychiatric: Behavior is normal. Thought content normal.  Nursing note and vitals reviewed. Labs and Imaging:     XR from 1/22/20 showed very mild degenerative changes with no evidence of AVN. No orders of the defined types were placed in this encounter. Assessment and Plan:  1. Greater trochanteric bursitis of left hip        This is a 75 y.o. male who presents to the clinic today for evaluation of left hip pain. Patient does have slight trochanteric tenderness on the left that is slightly more posterior. Under sterile conditions, Patient's left greater trochanter was injected with 2ml of Lidocaine, 2ml of Marcaine, and 2ml of celestone. Patient tolerated the procedure well. Follow up as needed.        Past History:    Current Outpatient Medications:     pantoprazole (PROTONIX) 40 MG tablet, TAKE 1 TABLET DAILY, Disp: 90 tablet, Rfl: 3    atorvastatin (LIPITOR) 80 MG tablet, TAKE 1 TABLET DAILY, Disp: 90 tablet, Rfl: 3    SITagliptin-metFORMIN (JANUMET XR)  MG TB24 per extended release tablet, Take 1 tablet by mouth every other day, Disp: 180 tablet, Rfl: 3    telmisartan (MICARDIS) 40 MG tablet, TAKE 1 TABLET DAILY, Disp: 90 tablet, Rfl: 3    hydrochlorothiazide (HYDRODIURIL) 25 MG tablet, TAKE 1 TABLET DAILY, Disp: 90 tablet, Rfl: 3    meloxicam (MOBIC) 15 MG tablet, TAKE 1 TABLET DAILY, Disp: 90 tablet, Rfl: 3    B Complex Vitamins (VITAMIN-B COMPLEX) TABS, Take 1 tablet by mouth, Disp: , Rfl:     fluticasone (FLONASE) 50 MCG/ACT nasal spray, 50 mcg by Nasal route, Disp: , Rfl:     prednisoLONE acetate (PRED FORTE) 1 % ophthalmic suspension, Place 1 drop into the right eye nightly , Disp: , Rfl:     Multiple Vitamins-Minerals (THERAPEUTIC MULTIVITAMIN-MINERALS) tablet, Take 1 tablet by mouth daily, Disp:

## 2020-03-23 ENCOUNTER — TELEPHONE (OUTPATIENT)
Dept: ORTHOPEDIC SURGERY | Age: 65
End: 2020-03-23

## 2020-07-22 ENCOUNTER — OFFICE VISIT (OUTPATIENT)
Dept: ORTHOPEDIC SURGERY | Age: 65
End: 2020-07-22
Payer: COMMERCIAL

## 2020-07-22 VITALS
DIASTOLIC BLOOD PRESSURE: 62 MMHG | WEIGHT: 274 LBS | SYSTOLIC BLOOD PRESSURE: 98 MMHG | HEART RATE: 71 BPM | TEMPERATURE: 97.6 F | HEIGHT: 69 IN | BODY MASS INDEX: 40.58 KG/M2

## 2020-07-22 PROCEDURE — 99213 OFFICE O/P EST LOW 20 MIN: CPT | Performed by: ORTHOPAEDIC SURGERY

## 2020-07-22 NOTE — PROGRESS NOTES
[facility]  [facility] Aguilar  [address] [place] [address]  Dept: [phone]    Ambulatory Orthopedic Consult      CHIEF COMPLAINT:    Chief Complaint   Patient presents with    Ankle Pain     right ankle       HISTORY OF PRESENT ILLNESS:      The patient is a 75 y.o. male who is being seen for consultation and evaluation of pain at the right anterior ankle and hindfoot, which began after January 2005 when he sustained a calcaneus fracture (status post subtalar fusion status post removal of hardware). The pain is described mainly with mechanical terms (dull/sharp/throbbing). The pain is worse with activity and better with rest. The patient reports a progressive course. The patient has tried:      [x]  rest/activity modification          [x]  NSAIDs      []  opiates      [x]  orthotics        [x]  change in shoes   []  home exercises  [x]  physical therapy      []  CAM boot     []  brace:    [x]  injection:       [x]  surgery:      The patient reports that he previously had a subtalar fusion with Dr. Simón Mckeon. REVIEW OF SYSTEMS:  Constitutional: Negative for fever. HENT: Negative for tinnitus. Eyes: Negative for pain. Respiratory: Negative for shortness of breath. Cardiovascular: Negative for chest pain. Gastrointestinal: Negative for abdominal pain. Genitourinary: Negative for dysuria. Skin: Negative for rash. Neurological: Negative for headaches. Hematological: Does not bruise/bleed easily.    Musculoskeletal: See HPI for pertinent positives     Past Medical History:    He  has a past medical history of Allergic rhinitis, Anxiety, Arrhythmia, Arthritis, Atrial fibrillation ([address] Benign hypertension (5/26/2015), Caffeine use, Depression, Diabetes mellitus ([address] GERD (gastroesophageal reflux disease), Hyperlipidemia LDL goal < 100 (5/26/2015), Hypertrophy of prostate with urinary obstruction and other lower urinary MCG/ACT nasal spray, 2 sprays by Nasal route daily (Patient taking differently: 2 sprays by Nasal route nightly as needed ), Disp: 1 Bottle, Rfl: 11     Allergies: Other; Penicillins; Sulfa antibiotics; Tetanus immune globulin; Tetanus toxoids; and Timolol maleate    Family History:  family history includes Asthma in his sister; Cancer in his mother and sister; Heart Attack in his father; Heart Disease in his brother and another family member; High Blood Pressure in his father; Kidney Cancer in his father. Social History:   Social History     Occupational History    Occupation: Retired    Tobacco Use    Smoking status: Former Smoker     Packs/day: 1.50     Years: 20.00     Pack years: 30.00     Types: Cigarettes     Start date:      Last attempt to quit:      Years since quittin.5    Smokeless tobacco: Former User    Tobacco comment: quit    Substance and Sexual Activity    Alcohol use: No     Alcohol/week: 0.0 standard drinks    Drug use: No    Sexual activity: Yes     Partners: Female     Occupation: Retired [employer]    OBJECTIVE:  BP 98/62   Pulse 71   Temp 97.6 °F (36.4 °C)   Ht 5' 9\" (1.753 m)   Wt 274 lb (124.3 kg)   BMI 40.46 kg/m²    Psych: alert and oriented to person, time, and place  Cardio:  well perfused extremities  Resp:  normal respiratory effort  Skin:  no cyanosis  Hem/lymph:  no lymphedema  Neuro:  sensation to light touch grossly intact throughout all nerve distributions in the foot   Musculoskeletal:    RLE:  Vascular: Toes warm and well perfused, compartments soft/compressible. Mild swelling of foot. Skin: Intact without rash/lesions/AV malformations.   Strength: Able to fire/perform the following with appropriate strength:    [x]  Tib Ant:     [x]  Gastroc-Soleus:         [x]  Inversion:    [x]  Eversion:         [x]  FHL:     [x]  EHL:      Motion:  Normal for the following joints:    []  Ankle: Decreased     []  Subtalar: None      [x]  1st MTP:      [] 1st TMT:            Tenderness to Palpation:    Tenderness to palpation: Across the anterior tibiotalar joint and diffusely throughout the lateral hindfoot      LLE:  Vascular: Toes warm and well perfused, compartments soft/compressible. No significant swelling of foot. Skin: Intact without rash/lesions/AV malformations. Strength: Able to fire/perform the following with appropriate strength:    [x]  Tib Ant:     [x]  Gastroc-Soleus:         [x]  Inversion:    [x]  Eversion:         [x]  FHL:     [x]  EHL:      Motion:  Normal for the following joints:    [x]  Ankle:      [x]  Subtalar:        [x]  1st MTP:      []  1st TMT:            Tenderness to Palpation:    Tenderness to palpation: None      RADIOLOGY:   7/22/2020 FINDINGS:  Three weightbearing views (AP, Mortise, and Lateral) of the right ankle and three weightbearing views (AP, Oblique, Lateral) of the right foot were obtained in the office today and reviewed, revealing no acute fracture, dislocation, or radioopaque foreign body/tumor. The subtalar joint appears to be fused, and there does appear to be loss of height and shortening of the calcaneus. Degenerative changes of the tibiotalar joint with joint space narrowing, sclerosis, and osteophytes, particularly with evidence of anterior ankle impingement including an anterior distal tibia osteophyte and osteophytes of the dorsal talar neck. IMPRESSION:  No acute fracture/dislocation. Degenerative changes of Tibiotalar joint as above. Subtalar arthrodesis. 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 Diagnoses of Arthritis of subtalar joint and Impingement syndrome of right ankle were also pertinent to this visit. Body mass index is 40.46 kg/m².        He has right ankle arthritis with anterior ankle impingement, status post subtalar fusion, as sequelae of a fall from a ladder in 2005 while at work. Notably, he has a somewhat complex past medical history including but not limited to the following:  Atrial fibrillation (status post ablation plus pacemaker; not taking a blood thinner), KIM, obesity (BMI greater than 40), non-insulin-dependent diabetes, and tobacco use (reports he smokes 1 cigar/day). I had a long discussion today with the patient about the likely diagnosis and its natural history, physical exam and imaging findings, as well as treatment options in detail. We discussed rest/activity modification, swelling control, NSAIDs/Acetaminophen/topical anesthetics, orthotics/shoewear modification, bracing/immobilization, injections, and physical therapy. Surgically, we discussed a possible right total ankle replacement versus fusion versus possible calcaneal osteotomy plus tibiotalar cheilectomy. At this point, after discussion of the postoperative course, he does wish to proceed with surgery in the winter. We also discussed tobacco cessation prior to proceeding with surgery. The patient wishes to proceed with the recommendations as above. Orders/referrals were placed as below at today's visit. In order to know exactly how to proceed surgically, a CT with prophecy protocol was ordered today for preoperative planning to evaluate his tibiotalar joint as well as his subtalar joint healing. This is medically necessary to evaluate the exact bony alignment/architecture. Given the worker's compensation claim associated with the patient's diagnosis, appropriate paperwork will be filled out by our office regarding today's visit.   The secondary arthritis of his tibiotalar joint with anterior ankle impingement will be added to his claim, as they did result from his injury (due to his loss of calcaneal height and length, he has now developed anterior ankle impingement; due to his subtalar fusion he now has adjacent joint disease due to increased stress at the tibiotalar joint). All questions were answered and the patient agrees with the above plan. The patient will return to clinic after the CT has been obtained with bilateral calcaneus x-rays and weightbearing standing alignment x-rays. At his next visit, we will again discuss surgical timing as well as a plan for surgery, and again revisit his tobacco use, and most likely pick a surgical date. No follow-ups on file. No orders of the defined types were placed in this encounter. Orders Placed This Encounter   Procedures    CT ANKLE RIGHT WO CONTRAST     Standing Status:   Future     Standing Expiration Date:   7/22/2021     Scheduling Instructions:      TO BE PERFORMED WITH 4-TellECY PROTOCOL     Order Specific Question:   Reason for exam:     Answer:   preop planning     Order Specific Question:   Reason for exam:     Answer:   eval ankle and subtalar joint    CT FOOT RIGHT WO CONTRAST     Fore/midfoot: Reformats     Showell axial reformats using routine reformat sagittal image that shows the entire first metatarsal (may have to oblique this image to see the entire first metatarsal). Adjust axial reformats plane to parallel the long axis of the first metatarsal. Collimation is 2 mm. Showell coronal reformats using the same image, perpendicular to the axial reformats. Collimation is 2 mm. Showell the sagittal reformats plane using the axial reformat image that shows the entire first metatarsal. Adjust the sagittal reformats plane to parallel the long axis of the first metatarsal. Collimation 2 mm. Standing Status:   Future     Standing Expiration Date:   7/22/2021     Scheduling Instructions:      TO BE PERFORMED WITH 4-TellECY PROTOCOL            Ankle must be at neutral (perpendicular to tibia) with toes pointed directly up.  Please center the ankle in the scanner (to include 3 inches above tibial plafond), and include

## 2020-07-22 NOTE — LETTER
necessary to evaluate the exact bony alignment/architecture. Given the worker's compensation claim associated with the patient's diagnosis, appropriate paperwork will be filled out by our office regarding today's visit. The secondary arthritis of his tibiotalar joint with anterior ankle impingement will be added to his claim, as they did result from his injury (due to his loss of calcaneal height and length, he has now developed anterior ankle impingement; due to his subtalar fusion he now has adjacent joint disease due to increased stress at the tibiotalar joint). All questions were answered and the patient agrees with the above plan. The patient will return to clinic after the CT has been obtained with bilateral calcaneus x-rays. At his next visit, we will again discuss surgical timing as well as a plan for surgery, and again revisit his tobacco use, and most likely pick a surgical date. I look forward to serving you and your patients again in the future. Please don't hesitate to contact me at my mobile number .         Miquel Barrientos MD  Orthopedic Surgery, Foot and Ankle

## 2020-08-06 ENCOUNTER — TELEPHONE (OUTPATIENT)
Dept: ORTHOPEDIC SURGERY | Age: 65
End: 2020-08-06

## 2020-08-06 NOTE — TELEPHONE ENCOUNTER
Tildon Bernheim called to report the approval of the C9. We did not receive the auth. He will fax it us to scan into the chart. Gave him the number to centralized scheduling to make his appt.

## 2020-08-13 ENCOUNTER — HOSPITAL ENCOUNTER (OUTPATIENT)
Dept: CT IMAGING | Age: 65
Discharge: HOME | End: 2020-08-15
Payer: COMMERCIAL

## 2020-08-13 ENCOUNTER — HOSPITAL ENCOUNTER (OUTPATIENT)
Dept: GENERAL RADIOLOGY | Age: 65
Discharge: HOME | End: 2020-08-15
Payer: COMMERCIAL

## 2020-08-13 ENCOUNTER — HOSPITAL ENCOUNTER (OUTPATIENT)
Age: 65
Discharge: HOME | End: 2020-08-15
Payer: COMMERCIAL

## 2020-08-13 PROCEDURE — 77073 BONE LENGTH STUDIES: CPT

## 2020-08-13 PROCEDURE — 73700 CT LOWER EXTREMITY W/O DYE: CPT

## 2020-08-24 ENCOUNTER — OFFICE VISIT (OUTPATIENT)
Dept: ORTHOPEDIC SURGERY | Age: 65
End: 2020-08-24
Payer: COMMERCIAL

## 2020-08-24 VITALS
DIASTOLIC BLOOD PRESSURE: 79 MMHG | WEIGHT: 265 LBS | OXYGEN SATURATION: 98 % | HEART RATE: 76 BPM | SYSTOLIC BLOOD PRESSURE: 115 MMHG | BODY MASS INDEX: 39.25 KG/M2 | HEIGHT: 69 IN

## 2020-08-24 PROCEDURE — 99214 OFFICE O/P EST MOD 30 MIN: CPT | Performed by: ORTHOPAEDIC SURGERY

## 2020-08-24 NOTE — LETTER
Dr. Olmos [facility]  [address] [address]  [phone]        8/24/2020     Patient: Marilou Alfonso  YOB: 1955    Dear Key Negron MD,    I had the pleasure of seeing one of your patients, Marilou Alfonso, recently in the office. We have decided to proceed with surgery, and the patient may be reaching out to your office in the near future for medical clearance/optimization. Below are the relevant portions of my assessment and plan of care. 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 Notably, he has a somewhat complex past medical history including but not limited to the following:  Atrial fibrillation (status post ablation plus pacemaker; not taking a blood thinner), KIM, obesity (BMI greater than 40), non-insulin-dependent diabetes, and tobacco use (reports he smokes 1 cigar/day). His most recent hemoglobin A1c was 6.5 on 5/22/2020. I had another discussion today with the patient about the likely diagnosis and its natural history, physical exam and imaging findings, as well as treatment options in detail. We discussed rest/activity modification, swelling control, NSAIDs/Acetaminophen/topical anesthetics, orthotics/shoewear modification, bracing/immobilization, injections, and physical therapy. Surgically, we discussed right subtalar joint revision arthrodesis with bone grafting (PDGF plus PTBG), with SHREYA. Postoperatively, he will use a bone stimulator. We have discussed tobacco cessation prior to proceeding with surgery.   We have discussed the postoperative course, and he does wish to proceed with surgery in the winter. Given the severity of his subtalar joint arthritis, I do not believe that any further conservative management is likely to yield any amount of significant reduction in pain relief, and I believe that surgery is the best treatment option for him at this point. In the future once he has healed from his subtalar fusion, he may require surgery for pain control from his ipsilateral right anterior ankle impingement and secondary arthritis. The patient wishes to proceed with the recommendations as above. Orders/referrals were placed as below at today's visit. At today's visit, the patient was ordered crutches, a walker, and a rolling knee scooter. I also ordered physical therapy for the patient to hep reinforce/teach the relevant weight bearing precautions, to help allow for safe transfers and early mobilization, as well as effectively utilize DME. Surgical booking paperwork was completed and submitted today. I ordered a bone stimulator today (ultrasound-based) for the patient. The patient is at increased risk for nonunion, given the following set of issues:  history of prior nonunion and history of smoking. Given this specific set of circumstances for this patient, I believe the risks of nonunion and the consequences of that (ie, the impact on his life) more than justify the use of a bone stimulator as a medically necessary device for this patient. Obtaining osseous union may also help avoid a possible future surgical intervention. I am concerned about his history of tobacco use. I have encouraged tobacco use cessation, and believe it is in the patient's best interest. He expressed verbal understanding. We discussed the risks of tobacco use in the context of surgery, including the risks of infection wound complication, DVT/pulmonary embolus, delayed healing, and nonunion.     Given the worker's compensation claim associated with the patient's

## 2020-08-24 NOTE — PROGRESS NOTES
[address] [facility]  [address] [facility] [address]  Dept: [phone]    Ambulatory Orthopedic Consult      CHIEF COMPLAINT:    Chief Complaint   Patient presents with    Ankle Pain     Right ankle pain        HISTORY OF PRESENT ILLNESS:      The patient is a 77 y.o. male who is being seen for consultation and evaluation of pain at the right anterior ankle and hindfoot, which began after January 2005 when he sustained a calcaneus fracture (status post subtalar fusion status post removal of hardware). The pain is described mainly with mechanical terms (dull/sharp/throbbing). The pain is worse with activity and better with rest. The patient reports a progressive course. The patient has tried:      [x]  rest/activity modification          [x]  NSAIDs      []  opiates      [x]  orthotics        [x]  change in shoes   []  home exercises  [x]  physical therapy      []  CAM boot     []  brace:    [x]  injection:       [x]  surgery:      The patient reports that he previously had a subtalar fusion with Dr. Delta Mills. INTERVAL HISTORY 8/24/2020:  He is seen again today in the office for follow up of a CT scan. Since being seen last, the patient is doing about the same overall. He is ambulating today using regular shoes without a brace or assistive device. The location and quality of the pain have not significantly changed since the last visit. REVIEW OF SYSTEMS:  Constitutional: Negative for fever. HENT: Negative for tinnitus. Eyes: Negative for pain. Respiratory: Negative for shortness of breath. Cardiovascular: Negative for chest pain. Gastrointestinal: Negative for abdominal pain. Genitourinary: Negative for dysuria. Skin: Negative for rash. Neurological: Negative for headaches. Hematological: Does not bruise/bleed easily.    Musculoskeletal: See HPI for pertinent positives     Past Medical History:    He  has a past the right eye nightly , Disp: , Rfl:     Multiple Vitamins-Minerals (THERAPEUTIC MULTIVITAMIN-MINERALS) tablet, Take 1 tablet by mouth daily, Disp: , Rfl:     sertraline (ZOLOFT) 100 MG tablet, Take 100 mg by mouth nightly Indications: Depression , Disp: , Rfl:     loratadine (CLARITIN) 10 MG tablet, Take 1 tablet by mouth daily (Patient taking differently: Take 10 mg by mouth daily as needed prn), Disp: 30 tablet, Rfl: 11    fluticasone (FLONASE) 50 MCG/ACT nasal spray, 2 sprays by Nasal route daily (Patient taking differently: 2 sprays by Nasal route nightly as needed ), Disp: 1 Bottle, Rfl: 11     Allergies: Other; Penicillins; Sulfa antibiotics; Tetanus immune globulin; Tetanus toxoids; and Timolol maleate    Family History:  family history includes Asthma in his sister; Cancer in his mother and sister; Heart Attack in his father; Heart Disease in his brother and another family member; High Blood Pressure in his father; Kidney Cancer in his father.     Social History:   Social History     Occupational History    Occupation: Retired    Tobacco Use    Smoking status: Former Smoker     Packs/day: 1.50     Years: 20.00     Pack years: 30.00     Types: Cigarettes     Start date:      Last attempt to quit:      Years since quittin.6    Smokeless tobacco: Former User    Tobacco comment: quit    Substance and Sexual Activity    Alcohol use: No     Alcohol/week: 0.0 standard drinks    Drug use: No    Sexual activity: Yes     Partners: Female     Occupation: Retired [employer]    OBJECTIVE:  /79   Pulse 76   Ht 5' 9\" (1.753 m)   Wt 265 lb (120.2 kg)   SpO2 98%   BMI 39.13 kg/m²    Psych: alert and oriented to person, time, and place  Cardio:  well perfused extremities  Resp:  normal respiratory effort  Skin:  no cyanosis  Hem/lymph:  no lymphedema  Neuro:  sensation to light touch grossly intact throughout all nerve distributions in the foot   Musculoskeletal:    RLE:  Vascular: to the following:  Atrial fibrillation (status post ablation plus pacemaker; not taking a blood thinner), KIM, obesity (BMI greater than 40), non-insulin-dependent diabetes, and tobacco use (reports he smokes 1 cigar/day). His most recent hemoglobin A1c was 6.5 on 5/22/2020. I had another discussion today with the patient about the likely diagnosis and its natural history, physical exam and imaging findings, as well as treatment options in detail. We discussed rest/activity modification, swelling control, NSAIDs/Acetaminophen/topical anesthetics, orthotics/shoewear modification, bracing/immobilization, injections, and physical therapy. Surgically, we discussed right subtalar joint revision arthrodesis with bone grafting (PDGF plus PTBG), with SHREYA. Postoperatively, he will use a bone stimulator. We have discussed tobacco cessation prior to proceeding with surgery. We have discussed the postoperative course, and he does wish to proceed with surgery in the winter. Given the severity of his subtalar joint arthritis, I do not believe that any further conservative management is likely to yield any amount of significant reduction in pain relief, and I believe that surgery is the best treatment option for him at this point. In the future once he has healed from his subtalar fusion, he may require surgery for pain control from his ipsilateral right anterior ankle impingement and secondary arthritis. The patient wishes to proceed with the recommendations as above. Orders/referrals were placed as below at today's visit. At today's visit, the patient was ordered crutches, a walker, and a rolling knee scooter. I also ordered physical therapy for the patient to hep reinforce/teach the relevant weight bearing precautions, to help allow for safe transfers and early mobilization, as well as effectively utilize DME. Surgical booking paperwork was completed and submitted today.     I ordered a bone stimulator today device ordered:  rolling walker   - Length of Need:  3 Months    DME Order for (Specify) as OP     - DME device ordered:  rolling knee scooter   - Length of Need:  3 Months    DME Order for (Specify) as OP     - DME device ordered:  crutches   - Length of Need:  3 Months         Daniel Myers MD  Orthopedic Surgery, Foot and Ankle        Please excuse any typos/errors, as this note was created with the assistance of voice recognition software. While intending to generate a document that actually reflects the content of the visit, the document can still have some errors including those of syntax and sound-a-like substitutions which may escape proof reading. In such instances, actual meaning can be extrapolated by context.

## 2020-09-23 ENCOUNTER — TELEPHONE (OUTPATIENT)
Dept: ORTHOPEDIC SURGERY | Age: 65
End: 2020-09-23

## 2020-09-23 NOTE — TELEPHONE ENCOUNTER
Spoke with patient about [facility] claim trying to get approved for surgery. The patient states he talked to someone at [facility] today and they need Dr. Waldemar Vargas to write a statement saying his health problems that are causing his surgery to be denied have been resolved/taken care of. I explained to the patient since Dr. Waldemar Vargas is not the physician handling those issues, it would be best for the patient to contact his PCP for a statement. Patient voiced understanding.

## 2020-10-15 PROBLEM — E11.65 UNCONTROLLED TYPE 2 DIABETES MELLITUS WITH HYPERGLYCEMIA (HCC): Status: RESOLVED | Noted: 2019-04-26 | Resolved: 2020-10-15

## 2020-10-26 ENCOUNTER — HOSPITAL ENCOUNTER (OUTPATIENT)
Dept: PHYSICAL THERAPY | Age: 65
Setting detail: THERAPIES SERIES
Discharge: HOME | End: 2020-10-26
Payer: COMMERCIAL

## 2020-10-26 PROCEDURE — 97161 PT EVAL LOW COMPLEX 20 MIN: CPT

## 2020-10-26 PROCEDURE — 97116 GAIT TRAINING THERAPY: CPT

## 2020-10-26 NOTE — CONSULTS
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 Pt has surgery scheduld for 20 at this time with plan for NWB for 8 weeks following. Currently pt only has crutches, however states that he is planning to get knee scooter and rolling walker. Pt states that he feels like he will be fine following surgery, does not expect any issues with NWB protocol. Comments:     Tests: [] X-Ray:    [] MRI:    [] Other:     Medications:  [x] Refer to full medical record [] None [] Other:  Allergies:       [x] Refer to full medical record [] None [] Other:        Martial Status    Home type 1 SH   Stairs from outside 2 CHERY, pt reports that he has a ramp that will take him straight into    Stairs inside --   Employement --   Job status --   Work Activities/duties  --   Recreational Activities Working out       Pain present?  Yes   Location R ankle   Pain Rating currently 3/10   Pain at worse 8/10   Pain at best 2/10   Description of pain Dull, aching, constant   Altered Sensation Pt reports occasional numbness/tingling in foot   What makes it worse Standing and walking for long time   What makes it better Heat, putting feet up   Pain altered treatment/action --   Symptom progression Stayed the same   Sleep Sleeping okay             Objective:    ROM: Pt with ROM in erick LEs WFL                   Strength: Pt grossly 5/5 in erick LEs excluding R ankle               Edema: Not assessed                Palpation/Pain: Not assessed          Special tests: Not assessed      Educated pt on use of DME, including: (Check box of device used)     [x] Knee Scooter: Instructed pt on appropriate height being even with contralateral knee. Reviewed safety concerns such as not gliding on turns and using breaks appropriately. [x] Rolling Walker: Instructed pt on appropriate height of even with wrists with arms at resting at side. Educated pt on safe gait pattern while using walker. Explained to pt the difference between a 4 wheeled walker and rolling walker and how a rolling walker is most appropriate for a NWB pt. [x] Axillary Crutches: Instructed pt on appropriate height of two finger width between crutch and axilla, as well as elbow flexion of approximately 20deg. Educated pt on how to adjust both crutch and handle height. Pt with good understanding of all techniques/uses and expressed no safety concerns. At this time pt will most benefit from use of: Rolling walker in home, scooter outside of home. Pt is unsafe to navigate steps at this time with axillary crutches, unable to complete properly or safely while maintaining precautions. [x] Stairs: Pt states that he will not need to complete stairs, however requests to practice with crutches. At this time, pt is unsafe to navigate steps. Comments: At this time, went over all ADs with pt with good understanding reported by pt. Following completion of eval, pt is safe to use rolling walker and knee scooter, however is unsafe to navigate steps with axillary crutches. After talking with pt, pt reports that he has a ramp in front of home with rails similar to parallel bars, allowing for easy entry without needing to use steps. Pt also reports that he does not have any other steps in home.  Due to this, pt would be safe to return home with use of rolling walker and scooter as he states that no stairs need to be navigated. However, discussed with pt that if stairs are needed to be used, pt is unsafe at this time. Assessment:  STG: (to be met in 1 treatments)  1. Educate patient on proper use of DME: MET (10/26/20)  2. Patient to perform transfers and weight bearing status independent without assistance: MET (10/26/20)  3. ? Strength: Pt reports understanding of HEP prescribed: MET (10/26/20)  4. Independent with Home Exercise Programs: MET (10/26/20)  5. Demonstrate Knowledge of fall prevention: MET (10/26/20)                     Patient goals: Gib Battles how to use knee scooter, crutches going up steps\"    Rehab Potential:  [x] Good  [] Fair  [] Poor   Suggested Professional Referral:  [x] No  [] Yes:  Barriers to Goal Achievement[de-identified]  [x] No  [] Yes:  Domestic Concerns:  [x] No  [] Yes:    Pt. Education:  [] Plans/Goals, Risks/Benefits discussed  [x] Home exercise program    Method of Education: [x] Verbal  [x] Demo  [x] Written  Comprehension of Education:  [x] Verbalizes understanding. [x] Demonstrates understanding. [] Needs Review. [] Demonstrates/verbalizes understanding of HEP/Ed previously given. Treatment Plan:  [] Therapeutic Exercise      [] Manual Therapy       [x] Instruction in HEP        [] Neuromuscular Re-education     [] Vasocompression Corallison Orozco)        [x] Gait Training                      []  Medication allergies reviewed for use of    Dexamethasone Sodium Phosphate 4mg/ml     with iontophoresis treatments. Pt is not allergic.     Frequency:  1x for DME eval      Todays Treatment:    Exercises:  Exercise     Reps/ Time Weight/ Level Comments                                                         Other:    Specific Instructions for next treatment: Plan to DC pt at this time as pt is independent with all DME besides crutches, however states that he does not have stairs so will not need to use    Evaluation Complexity:  History (Personal factors, comorbidities) [] 0 [x] 1-2 [] 3+   Exam (limitations, restrictions) [x] 1-2 [] 3 [] 4+   Clinical presentation (progression) [x] Stable [] Evolving  [] Unstable   Decision Making [x] Low [] Moderate [] High    [x] Low Complexity [] Moderate Complexity [] High Complexity       Treatment Charges: Mins Units   [x] Evaluation       [x]  Low       []  Moderate       []  High 34 1   []  Modalities     []  Ther Exercise     []  Manual Therapy     []  Ther Activities     []  Aquatics     []  Vasocompression     [x]  Other: Gait 18 1     TOTAL TREATMENT TIME: 52    Time in: 9691   Time Out: 7629    Electronically signed by: Facundo Guzman PT        Physician Signature:________________________________Date:__________________  By signing above or cosigning this note, I have reviewed this plan of care and certify a need for medically necessary rehabilitation services.      *PLEASE SIGN ABOVE AND FAX BACK ALL PAGES*

## 2020-10-27 ENCOUNTER — TELEPHONE (OUTPATIENT)
Dept: ORTHOPEDIC SURGERY | Age: 65
End: 2020-10-27

## 2020-10-27 ENCOUNTER — HOSPITAL ENCOUNTER (OUTPATIENT)
Dept: GENERAL RADIOLOGY | Age: 65
Discharge: HOME | End: 2020-10-29
Payer: COMMERCIAL

## 2020-10-27 ENCOUNTER — HOSPITAL ENCOUNTER (OUTPATIENT)
Dept: PREADMISSION TESTING | Age: 65
Discharge: HOME | End: 2020-10-31
Payer: COMMERCIAL

## 2020-10-27 VITALS
RESPIRATION RATE: 16 BRPM | TEMPERATURE: 95.7 F | OXYGEN SATURATION: 96 % | SYSTOLIC BLOOD PRESSURE: 115 MMHG | WEIGHT: 275 LBS | BODY MASS INDEX: 40.73 KG/M2 | HEIGHT: 69 IN | DIASTOLIC BLOOD PRESSURE: 68 MMHG | HEART RATE: 73 BPM

## 2020-10-27 LAB
ABSOLUTE EOS #: 0.22 K/UL (ref 0–0.44)
ABSOLUTE IMMATURE GRANULOCYTE: 0.02 K/UL (ref 0–0.3)
ABSOLUTE LYMPH #: 1.95 K/UL (ref 1.1–3.7)
ABSOLUTE MONO #: 0.62 K/UL (ref 0.1–1.2)
ANION GAP SERPL CALCULATED.3IONS-SCNC: 11 MMOL/L (ref 9–17)
BASOPHILS # BLD: 1 % (ref 0–2)
BASOPHILS ABSOLUTE: 0.03 K/UL (ref 0–0.2)
BUN BLDV-MCNC: 16 MG/DL (ref 8–23)
BUN/CREAT BLD: 16 (ref 9–20)
CALCIUM SERPL-MCNC: 9.2 MG/DL (ref 8.6–10.4)
CHLORIDE BLD-SCNC: 100 MMOL/L (ref 98–107)
CO2: 27 MMOL/L (ref 20–31)
CREAT SERPL-MCNC: 1.03 MG/DL (ref 0.7–1.2)
DIFFERENTIAL TYPE: NORMAL
EOSINOPHILS RELATIVE PERCENT: 4 % (ref 1–4)
GFR AFRICAN AMERICAN: >60 ML/MIN
GFR NON-AFRICAN AMERICAN: >60 ML/MIN
GFR SERPL CREATININE-BSD FRML MDRD: ABNORMAL ML/MIN/{1.73_M2}
GFR SERPL CREATININE-BSD FRML MDRD: ABNORMAL ML/MIN/{1.73_M2}
GLUCOSE BLD-MCNC: 172 MG/DL (ref 70–99)
HCT VFR BLD CALC: 42.8 % (ref 40.7–50.3)
HEMOGLOBIN: 14.6 G/DL (ref 13–17)
IMMATURE GRANULOCYTES: 0 %
LYMPHOCYTES # BLD: 31 % (ref 24–43)
MCH RBC QN AUTO: 30.6 PG (ref 25.2–33.5)
MCHC RBC AUTO-ENTMCNC: 34.1 G/DL (ref 28.4–34.8)
MCV RBC AUTO: 89.7 FL (ref 82.6–102.9)
MONOCYTES # BLD: 10 % (ref 3–12)
NRBC AUTOMATED: 0 PER 100 WBC
PDW BLD-RTO: 12.2 % (ref 11.8–14.4)
PLATELET # BLD: 179 K/UL (ref 138–453)
PLATELET ESTIMATE: NORMAL
PMV BLD AUTO: 10.5 FL (ref 8.1–13.5)
POTASSIUM SERPL-SCNC: 3.9 MMOL/L (ref 3.7–5.3)
RBC # BLD: 4.77 M/UL (ref 4.21–5.77)
RBC # BLD: NORMAL 10*6/UL
SEG NEUTROPHILS: 54 % (ref 36–65)
SEGMENTED NEUTROPHILS ABSOLUTE COUNT: 3.49 K/UL (ref 1.5–8.1)
SODIUM BLD-SCNC: 138 MMOL/L (ref 135–144)
WBC # BLD: 6.3 K/UL (ref 3.5–11.3)
WBC # BLD: NORMAL 10*3/UL

## 2020-10-27 PROCEDURE — 80048 BASIC METABOLIC PNL TOTAL CA: CPT

## 2020-10-27 PROCEDURE — 36415 COLL VENOUS BLD VENIPUNCTURE: CPT

## 2020-10-27 PROCEDURE — 85025 COMPLETE CBC W/AUTO DIFF WBC: CPT

## 2020-10-27 PROCEDURE — 71046 X-RAY EXAM CHEST 2 VIEWS: CPT

## 2020-10-27 RX ORDER — ACETAMINOPHEN 500 MG
1000 TABLET ORAL ONCE
Status: CANCELLED | OUTPATIENT
Start: 2020-11-12

## 2020-10-27 RX ORDER — CLINDAMYCIN PHOSPHATE 900 MG/50ML
900 INJECTION INTRAVENOUS ONCE
Status: CANCELLED | OUTPATIENT
Start: 2020-11-12

## 2020-10-27 ASSESSMENT — PAIN DESCRIPTION - FREQUENCY: FREQUENCY: CONTINUOUS

## 2020-10-27 ASSESSMENT — PAIN DESCRIPTION - PAIN TYPE: TYPE: CHRONIC PAIN

## 2020-10-27 ASSESSMENT — PAIN SCALES - GENERAL: PAINLEVEL_OUTOF10: 2

## 2020-10-27 ASSESSMENT — PAIN - FUNCTIONAL ASSESSMENT: PAIN_FUNCTIONAL_ASSESSMENT: PREVENTS OR INTERFERES WITH ALL ACTIVE AND SOME PASSIVE ACTIVITIES

## 2020-10-27 ASSESSMENT — PAIN DESCRIPTION - DESCRIPTORS: DESCRIPTORS: ACHING

## 2020-10-27 ASSESSMENT — PAIN DESCRIPTION - ORIENTATION: ORIENTATION: RIGHT

## 2020-10-27 ASSESSMENT — PAIN DESCRIPTION - PROGRESSION: CLINICAL_PROGRESSION: NOT CHANGED

## 2020-10-27 ASSESSMENT — PAIN DESCRIPTION - ONSET: ONSET: ON-GOING

## 2020-10-27 ASSESSMENT — PAIN DESCRIPTION - LOCATION: LOCATION: FOOT

## 2020-10-27 NOTE — PRE-PROCEDURE INSTRUCTIONS
Covid testing on 11/8/20 at 10:00am main entrance, please stay in your vehicle and a savita will come to you. [facility], [facility]  at 11:00 AM  On arrival, please call [phone]. Continue to take your home medications as you normally do up to and including the night before surgery with the exception of any blood thinning medications. Please stop any blood thinning medications as directed by your surgeon or prescribing physician. Failure to stop certain medications may interfere with your scheduled surgery. These may include:  Aspirin, Warfarin (Coumadin), Clopidogrel (Plavix), Ibuprofen (Motrin, Advil), Naproxen (Aleve), Meloxicam (Mobic), Celecoxib (Celebrex), Diclofenac, Eliquis, Pradaxa, Xarelto, Effient, Fish Oil, Herbal supplements. Tylenol/Acetaminophen is okay. Patient instructed to stop Aspirin 7 days prior, on Cardiac Clearance ok to stop up to 14 days prior to surgery per Cardiologist.    If you are diabetic, do not take any of your diabetic medications by mouth the morning of surgery. If you are taking insulin contact the doctor that manages your diabetes for instructions about any changes to your insulin dosages the day before surgery. Do not inject insulin or other injectable diabetic medications the morning of surgery unless otherwise instructed by the doctor who manages your diabetes. Please take the following medication(s) the day of surgery with a small sip of water:  Metoprolol, Pantoprazole. Please use your inhalers at home the day of surgery. PREPARING FOR YOUR SURGERY:     Before surgery, you can play an important role in your own health. Because skin is not sterile, we need to be sure that your skin is as free of germs as possible before surgery by carefully washing before surgery. Preparing or prepping skin before surgery can reduce the risk of a surgical site infection.   Do not shave the area of your body where your surgery will be performed unless you received specific permission from your physician. You will need to shower at home the night before surgery and the morning of surgery with a special soap called chlorhexidine gluconate (CHG*). *Not to be used by people allergic to Chlorhexidine Gluconate (CHG). Following these instructions will help you be sure that your skin is clean before surgery. Instructions on cleaning your skin before surgery: The night before your surgery:      You will need to shower with warm water (not hot) and the CHG soap.  Use a clean wash cloth and a clean towel. Have clean clothes available to put on after the shower.    First wash your hair with regular shampoo. Rinse your hair and body thoroughly to remove the shampoo. [facility] Wash your face with your regular soap or water only. Thoroughly rinse your body with warm water from the neck down.  Turn water off to prevent rinsing the soap off too soon.  With a clean wet washcloth and half of the CHG soap in the bottle, lather your entire body from the neck down. Do not use CHG soap near your eyes or ears to avoid injury to those areas.  Wash thoroughly, paying special attention to the area where your surgery will be performed.  Wash your body gently for five (5) minutes. Avoid scrubbing your skin too hard.  Turn the water back on and rinse your body thoroughly.  Pat yourself dry with a clean, soft towel. Do not apply lotion, cream or powder.  Dress with clean freshly washed clothes. The morning of surgery:     Repeat shower following steps above - using remaining half of CHG soap in bottle. Patient Instructions:    [facility] If you are having any type of anesthesia you are to have nothing to eat or drink after midnight the night before your surgery.   This includes gum, mints, water or smoking or chewing tobacco.  The only exception to this is a small sip of water to take with any morning dose of heart, blood pressure, or seizure medications. No alcoholic beverages for 24 hours prior to surgery.  Bring a list of all medications you take, along with the dose of the medications and how often you take it. If more convenient bring the pharmacy bottles in a zip lock bag.  Brush your teeth but do not swallow water.  Bring your eyeglasses and case with you. No contacts are to be worn the day of surgery. You also may bring your hearing aids. Most surgical procedures involving anesthesia will require that you remove your dentures prior to surgery.  If you are on C-PAP or Bi-PAP at home and plan on staying in the hospital overnight for your surgery please bring the machine with you. · Do not wear any jewelry or body piercings day of surgery. Also, NO lotion, perfume or deodorant to be used the day of surgery. No nail polish on the operative extremity (arm/leg surgeries)    · Do not bring any valuables such as jewelry, cash, or credit cards. If you are staying overnight with us, please bring a small bag of personal items.  Please wear loose, comfortable clothing. If you are potentially going to have a cast or brace bring clothing that will fit over them.  In case of illness - If you have cold or flu like symptoms (high fever, runny nose, sore throat, cough, etc.) rash, nausea, vomiting, loose stools, and/or recent contact with someone who has a contagious disease (chicken pox, measles, etc.) Please call your doctor before coming to the hospital.     If your child is having surgery please make arrangements for any other children to be cared for at home on the day of surgery. Other children are not permitted in recovery room and we want you to be able to spend time with the patient.   If other arrangements are not available then we suggest that you have a second adult to stay in the waiting room.       Day of Surgery/Procedure:    As a patient at [place] [address] you can expect quality medical and nursing care that is centered on your individual needs. Our goal is to make your surgical experience as comfortable as possible    . Transportation After Your Surgery/Procedure: You will need a friend or family member to drive you home after your procedure. Your  must be 25years of age or older and able to sign off on your discharge instructions. A taxi cab or any other form of public transportation is not acceptable. Your friend or family member must stay at the hospital throughout your procedure. Someone must remain with you for the first 24 hours after your surgery if you receive anesthesia or medication. If you do not have someone to stay with you, your procedure may be cancelled.       If you have any other questions regarding your procedure or the day of surgery, please call [phone]      _________________________  ____________________________  Signature (Patient)              Signature (Provider) & date

## 2020-10-27 NOTE — PROGRESS NOTES
hypertension    Other hyperlipidemia    Depression    Benign prostatic hyperplasia with urinary obstruction    Other urinary incontinence    Morbid obesity with BMI of 40.0-44.9, adult ([facility])    Controlled type 2 diabetes mellitus without complication, without long-term current use of insulin ([facility])    Closed fracture of one rib    Presence of cardiac pacemaker    KIM (obstructive sleep apnea)    Class 3 obesity in adult    Viral URI         Review of Systems   Constitutional: Negative for appetite change, fatigue and unexpected weight change. HENT: Negative for hearing loss. Eyes: Negative for visual disturbance. Respiratory: Negative for cough and shortness of breath. Cardiovascular: Negative for chest pain, palpitations and leg swelling. Gastrointestinal: Negative for abdominal pain, constipation and diarrhea. Genitourinary: Negative for difficulty urinating and frequency. Musculoskeletal: Positive for arthralgias. Negative for myalgias. Skin: Negative for rash. Neurological: Negative for syncope, light-headedness and headaches. Hematological: Negative for adenopathy. Psychiatric/Behavioral: Negative for dysphoric mood. Prior to Visit Medications    Medication Sig Taking? Authorizing Provider   metoprolol succinate (TOPROL XL) 25 MG extended release tablet Take 1 tablet by mouth daily   Jorge Tang MD   hydroCHLOROthiazide (HYDRODIURIL) 25 MG tablet TAKE 1 TABLET DAILY   Jorge Tang MD   meloxicam (MOBIC) 15 MG tablet TAKE 1 TABLET DAILY   Jorge Tang MD   SITagliptin-metFORMIN (JANUMET XR)  MG TB24 per extended release tablet Take 1 tablet by mouth every other day   MILLIE Chowdary - NP   telmisartan (MICARDIS) 40 MG tablet TAKE 1 TABLET DAILY   MILLIE Ford NP   erythromycin (ROMYCIN) 5 MG/GM ophthalmic ointment     Historical Provider, MD   blood glucose test strips (ACCU-CHEK GUIDE) strip CHECK TWO TIMES A DAY.    Gustavo Stanton Kb Radford MD   pantoprazole (PROTONIX) 40 MG tablet TAKE 1 TABLET DAILY   MILLIE Montiel - CNP   atorvastatin (LIPITOR) 80 MG tablet TAKE 1 TABLET DAILY   MILLIE Montiel - CNP   B Complex Vitamins (VITAMIN-B COMPLEX) TABS Take 1 tablet by mouth   Historical Provider, MD   prednisoLONE acetate (PRED FORTE) 1 % ophthalmic suspension Place 1 drop into the right eye nightly    Historical Provider, MD   Multiple Vitamins-Minerals (THERAPEUTIC MULTIVITAMIN-MINERALS) tablet Take 1 tablet by mouth daily   Historical Provider, MD   sertraline (ZOLOFT) 100 MG tablet Take 100 mg by mouth nightly Indications: Depression    Historical Provider, MD   loratadine (CLARITIN) 10 MG tablet Take 1 tablet by mouth daily  Patient taking differently: Take 10 mg by mouth daily as needed prn   Maryetta Seip, MD   fluticasone (FLONASE) 50 MCG/ACT nasal spray 2 sprays by Nasal route daily  Patient taking differently: 2 sprays by Nasal route nightly as needed    Maryetta Seip, MD               Allergies   Allergen Reactions    Other Itching and Other (See Comments)       EKG patches-- red spot lasting for a week    Penicillins Hives and Other (See Comments)       Other reaction(s): Intolerance-unknown    Sulfa Antibiotics Hives       Other reaction(s): Intolerance-unknown    Tetanus Immune Globulin Hives       Other reaction(s):  Intolerance-unknown    Tetanus Toxoids Hives    Timolol Maleate Swelling       Swelling of eyes         Past Medical History        Past Medical History:   Diagnosis Date    Allergic rhinitis      Anxiety      Arrhythmia      Arthritis      Atrial fibrillation (HCC)       on Pacemaker, No Blood Thinner, 2 Ablation    Benign hypertension 5/26/2015    Caffeine use       3 cups coffee day    Depression      Diabetes mellitus (HCC)      GERD (gastroesophageal reflux disease)      Hyperlipidemia LDL goal < 100 5/26/2015    Hypertrophy of prostate with urinary Social connections       Talks on phone: Not on file       Gets together: Not on file       Attends Holiness service: Not on file       Active member of club or organization: Not on file       Attends meetings of clubs or organizations: Not on file       Relationship status: Not on file    Intimate partner violence       Fear of current or ex partner: Not on file       Emotionally abused: Not on file       Physically abused: Not on file       Forced sexual activity: Not on file   Other Topics Concern    Not on file   Social History Narrative    Not on file            Family History         Family History   Problem Relation Age of Onset    Asthma Sister      Heart Disease Other           paternal history    Cancer Mother           melanoma    Kidney Cancer Father      Heart Attack Father      High Blood Pressure Father      Heart Disease Brother      Cancer Sister             ADVANCE DIRECTIVE: N, <no information>     Vitals   There were no vitals filed for this visit. Estimated body mass index is 39.13 kg/m² as calculated from the following:    Height as of 8/24/20: 5' 9\" (1.753 m). Weight as of 8/24/20: 265 lb (120.2 kg). Physical Exam  Vitals signs and nursing note reviewed. Constitutional:       Appearance: He is well-developed. He is obese. HENT:      Head: Normocephalic and atraumatic. Right Ear: External ear normal.      Left Ear: External ear normal.   Eyes:      General:         Right eye: No discharge. Left eye: No discharge. Conjunctiva/sclera: Conjunctivae normal.   Neck:      Musculoskeletal: Neck supple. Thyroid: No thyromegaly. Cardiovascular:      Rate and Rhythm: Normal rate and regular rhythm. Heart sounds: Normal heart sounds. No murmur. No friction rub. No gallop. Pulmonary:      Effort: Pulmonary effort is normal. No respiratory distress. Breath sounds: Normal breath sounds. No wheezing or rales.    Lymphadenopathy:      Cervical: No 05/22/2021    Creatinine monitoring  05/22/2021    Pneumococcal 65+ years Vaccine (2 of 2 - PPSV23) 08/11/2022    Colon cancer screen colonoscopy  08/01/2027    Hepatitis C screen  Completed    Hepatitis A vaccine  Aged Out    Hib vaccine  Aged Out    Meningococcal (ACWY) vaccine  Aged Out         ASSESSMENT/PLAN:    Diagnosis Orders   1. Controlled type 2 diabetes mellitus without complication, without long-term current use of insulin (HCC)  POCT glycosylated hemoglobin (Hb A1C)   2. Preop general physical exam               No follow-ups on file. An electronic signature was used to authenticate this note.      --Glenn Ty MD on 10/15/2020 at 8:14 AM

## 2020-10-30 ENCOUNTER — TELEPHONE (OUTPATIENT)
Dept: ORTHOPEDIC SURGERY | Age: 65
End: 2020-10-30

## 2020-11-04 ENCOUNTER — TELEPHONE (OUTPATIENT)
Dept: ORTHOPEDIC SURGERY | Age: 65
End: 2020-11-04

## 2020-11-06 ENCOUNTER — OFFICE VISIT (OUTPATIENT)
Dept: ORTHOPEDIC SURGERY | Age: 65
End: 2020-11-06
Payer: COMMERCIAL

## 2020-11-06 VITALS — TEMPERATURE: 97.3 F | WEIGHT: 275 LBS | HEIGHT: 69 IN | RESPIRATION RATE: 12 BRPM | BODY MASS INDEX: 40.73 KG/M2

## 2020-11-06 PROCEDURE — 99213 OFFICE O/P EST LOW 20 MIN: CPT | Performed by: ORTHOPAEDIC SURGERY

## 2020-11-06 NOTE — PROGRESS NOTES
[facility]  [place] [address] [address]  Dept: [phone]    Ambulatory Orthopedic Consult      CHIEF COMPLAINT:    Chief Complaint   Patient presents with    Ankle Pain     Right Ankle       HISTORY OF PRESENT ILLNESS:      The patient is a 77 y.o. male who is being seen for consultation and evaluation of pain at the right anterior ankle and hindfoot, which began after January 2005 when he sustained a calcaneus fracture (status post subtalar fusion status post removal of hardware). The pain is described mainly with mechanical terms (dull/sharp/throbbing). The pain is worse with activity and better with rest. The patient reports a progressive course. The patient has tried:      [x]  rest/activity modification          [x]  NSAIDs      []  opiates      [x]  orthotics        [x]  change in shoes   []  home exercises  [x]  physical therapy      []  CAM boot     []  brace:    [x]  injection:       [x]  surgery:      The patient reports that he previously had a subtalar fusion with Dr. Sagar Brown. INTERVAL HISTORY 8/24/2020:  He is seen again today in the office for follow up of a CT scan. Since being seen last, the patient is doing about the same overall. He is ambulating today using regular shoes without a brace or assistive device. The location and quality of the pain have not significantly changed since the last visit. INTERVAL HISTORY 11/6/2020:  He is seen again today in the office for follow up of a previous issue (as above). Since being seen last, he reports the problem has not significantly improved. At today's visit, he is using no brace or assistive device. The location and quality of the pain have not significantly changed since the last visit. He is here today for a preoperative visit, and wishes to proceed with surgery as planned. He denies any other significant changes in medical history.          REVIEW OF SYSTEMS:  Constitutional: Negative for fever. HENT: Negative for tinnitus. Eyes: Negative for pain. Respiratory: Negative for shortness of breath. Cardiovascular: Negative for chest pain. Gastrointestinal: Negative for abdominal pain. Genitourinary: Negative for dysuria. Skin: Negative for rash. Neurological: Negative for headaches. Hematological: Does not bruise/bleed easily. Musculoskeletal: See HPI for pertinent positives     Past Medical History:    He  has a past medical history of Allergic rhinitis, Anxiety, Arrhythmia, Arthritis, Atrial fibrillation ([address] Benign hypertension (5/26/2015), Blind right eye, Caffeine use, Cough, Depression, Diabetes mellitus ([address] GERD (gastroesophageal reflux disease), Glaucoma, Hyperlipidemia LDL goal < 100 (5/26/2015), Hypertrophy of prostate with urinary obstruction and other lower urinary tract symptoms (LUTS), Morbid obesity ([address] (5/26/2015), Mumps, Unspecified sleep apnea, and Wears glasses. Past Surgical History:    He  has a past surgical history that includes Vasectomy; Cystocopy; Cardioversion (2012 ?); Colonoscopy; Elbow surgery (Right, 1967); Total knee arthroplasty (Right, 2015); Corneal transplant (Right, multiple); Foot surgery (Right, 07/14/2016); Ankle arthroscopy (Right, 02/23/2017); Ankle arthroscopy (Right, 2/23/2017); and pacemaker placement (12/23/2016).      Current Medications:     Current Outpatient Medications:     Alcohol Swabs (ALCOHOL PREP) 70 % PADS, Check glucose AC BID, dx E11.9, may sub covered product, Disp: 200 each, Rfl: 11    blood glucose test strips (ASCENSIA AUTODISC VI;ONE TOUCH ULTRA TEST VI) strip, Check glucose AC BID, dx E11.9, may sub covered product, Disp: 200 strip, Rfl: 11    Lancets MISC, 1 each by Does not apply route daily, Disp: 200 each, Rfl: 5    Lancet Devices (LANCING DEVICE) MISC, Check glucose AC BID, dx E11.9, may sub covered product, Disp: 1 each, Rfl: 0    Blood Glucose Monitoring Suppl (ONE TOUCH ULTRA 2) w/Device KIT, Check glucose AC BID, dx E11.9, may sub covered product, Disp: 1 kit, Rfl: 0    blood glucose test strips (ACCU-CHEK GUIDE) strip, CHECK TWO TIMES A DAY., Disp: 200 strip, Rfl: 3    ASPIRIN 81 PO, Take by mouth, Disp: , Rfl:     metoprolol succinate (TOPROL XL) 25 MG extended release tablet, Take 1 tablet by mouth daily, Disp: 90 tablet, Rfl: 3    hydroCHLOROthiazide (HYDRODIURIL) 25 MG tablet, TAKE 1 TABLET DAILY, Disp: 90 tablet, Rfl: 3    meloxicam (MOBIC) 15 MG tablet, TAKE 1 TABLET DAILY, Disp: 90 tablet, Rfl: 3    SITagliptin-metFORMIN (JANUMET XR)  MG TB24 per extended release tablet, Take 1 tablet by mouth every other day, Disp: 180 tablet, Rfl: 3    telmisartan (MICARDIS) 40 MG tablet, TAKE 1 TABLET DAILY (Patient taking differently: nightly ), Disp: 90 tablet, Rfl: 3    erythromycin (ROMYCIN) 5 MG/GM ophthalmic ointment, , Disp: , Rfl:     pantoprazole (PROTONIX) 40 MG tablet, TAKE 1 TABLET DAILY, Disp: 90 tablet, Rfl: 3    atorvastatin (LIPITOR) 80 MG tablet, TAKE 1 TABLET DAILY, Disp: 90 tablet, Rfl: 3    B Complex Vitamins (VITAMIN-B COMPLEX) TABS, Take 1 tablet by mouth, Disp: , Rfl:     prednisoLONE acetate (PRED FORTE) 1 % ophthalmic suspension, Place 1 drop into the right eye nightly , Disp: , Rfl:     Multiple Vitamins-Minerals (THERAPEUTIC MULTIVITAMIN-MINERALS) tablet, Take 1 tablet by mouth daily, Disp: , Rfl:     sertraline (ZOLOFT) 100 MG tablet, Take 100 mg by mouth nightly Indications: Depression , Disp: , Rfl:     loratadine (CLARITIN) 10 MG tablet, Take 1 tablet by mouth daily (Patient taking differently: Take 10 mg by mouth daily as needed prn), Disp: 30 tablet, Rfl: 11    fluticasone (FLONASE) 50 MCG/ACT nasal spray, 2 sprays by Nasal route daily (Patient taking differently: 2 sprays by Nasal route nightly as needed ), Disp: 1 Bottle, Rfl: 11     Allergies: Other; Penicillins; Sulfa antibiotics;  Tetanus immune globulin; Tetanus toxoids; and Timolol maleate    Family History:  family history includes Asthma in his sister; Cancer in his mother and sister; Heart Attack in his father; Heart Disease in his brother and another family member; High Blood Pressure in his father; Kidney Cancer in his father. Social History:   Social History     Occupational History    Occupation: Retired    Tobacco Use    Smoking status: Former Smoker     Packs/day: 1.50     Years: 20.00     Pack years: 30.00     Types: Cigarettes     Start date:      Last attempt to quit:      Years since quittin.8    Smokeless tobacco: Former User    Tobacco comment: quit    Substance and Sexual Activity    Alcohol use: No     Alcohol/week: 0.0 standard drinks    Drug use: No    Sexual activity: Yes     Partners: Female     Occupation: Retired [employer]    OBJECTIVE:  Temp 97.3 °F (36.3 °C)   Resp 12   Ht 5' 9\" (1.753 m)   Wt 275 lb (124.7 kg)   BMI 40.61 kg/m²    Psych: alert and oriented to person, time, and place  Cardio:  well perfused extremities  Resp:  normal respiratory effort  Skin:  no cyanosis  Hem/lymph:  no lymphedema  Neuro:  sensation to light touch grossly intact throughout all nerve distributions in the foot   Musculoskeletal:    RLE:  Vascular: Toes warm and well perfused, compartments soft/compressible. Mild swelling of foot. Skin: Intact without rash/lesions/AV malformations. Healed incisions show a prior extensile lateral approach as well as a sinus Tarsi approach.   Strength: Able to fire/perform the following with appropriate strength:    [x]  Tib Ant:     [x]  Gastroc-Soleus:         [x]  Inversion:    [x]  Eversion:         [x]  FHL:     [x]  EHL:      Motion:  Normal for the following joints:    []  Ankle: Decreased     []  Subtalar: None      [x]  1st MTP:      []  1st TMT:            Tenderness to Palpation:    Tenderness to palpation: Across the anterior tibiotalar joint and diffusely throughout the lateral hindfoot      LLE:  Vascular: Toes warm and well perfused, compartments soft/compressible. No significant swelling of foot. Skin: Intact without rash/lesions/AV malformations. Strength: Able to fire/perform the following with appropriate strength:    [x]  Tib Ant:     [x]  Gastroc-Soleus:         [x]  Inversion:    [x]  Eversion:         [x]  FHL:     [x]  EHL:      Motion:  Normal for the following joints:    [x]  Ankle:      [x]  Subtalar:        [x]  1st MTP:      []  1st TMT:            Tenderness to Palpation:    Tenderness to palpation: None      RADIOLOGY:   11/6/2020 No new radiology images today. Prior images reviewed for reference. FINDINGS:  Two weightbearing views (Axial and Lateral) of the bilateral calcaneus were obtained in the office today and reviewed, revealing no acute fracture, dislocation, or radioopaque foreign body/tumor. Severe posttraumatic degenerative changes of the right subtalar joint with joint space narrowing, sclerosis, and osteophytes, as well as radiographic signs of right anterior ankle impingement with joint space narrowing and osteophytes, along with loss of right calcaneal height. IMPRESSION: No acute fracture/dislocation. Degenerative changes as above    Electronically signed by Aicha Barnes MD      11/6/2020 Prior images reviewed for reference.  CT images and radiology report reviewed, as below:    Evidence of prior right subtalar joint fusion with the hardware being    removed.  Less than 25% osseous bridging at the subtalar joint.  There is    prominent osteophyte formation at the posterior aspect of the calcaneus at    the subtalar joint.         Mild-to-moderate degenerative changes at the midfoot and hindfoot bilaterally    as described above.               FINDINGS:  Three weightbearing views (AP, Mortise, and Lateral) of the right ankle and three weightbearing views (AP, Oblique, Lateral) of the right foot were obtained in the office today and reviewed, revealing no acute fracture, dislocation, or radioopaque foreign body/tumor. The subtalar joint appears to be fused, and there does appear to be loss of height and shortening of the calcaneus. Degenerative changes of the tibiotalar joint with joint space narrowing, sclerosis, and osteophytes, particularly with evidence of anterior ankle impingement including an anterior distal tibia osteophyte and osteophytes of the dorsal talar neck. IMPRESSION:  No acute fracture/dislocation. Degenerative changes of Tibiotalar joint as above. Subtalar arthrodesis. Electronically signed by Rachael Zuluaga MD      LABS:   Lab Results   Component Value Date    LABA1C 6.7 10/15/2020     No results found for: EAG      ASSESSMENT AND PLAN:  Wynne Hammans was seen today for Ankle Pain (Right Ankle)  The primary encounter diagnosis was Arthritis of subtalar joint. 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 Body mass index is 40.61 kg/m². 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 Notably, he has a somewhat complex past medical history including but not limited to the following:  Atrial fibrillation (status post ablation plus pacemaker; not taking a blood thinner), KIM, obesity (BMI greater than 40), non-insulin-dependent diabetes, and tobacco use (reports he smokes 1 cigar/day). His most recent hemoglobin A1c above. I had another discussion today with the patient about the likely diagnosis and its natural history, physical exam and imaging findings, as well as treatment options in detail.  We discussed rest/activity modification, swelling control, NSAIDs/Acetaminophen/topical anesthetics, orthotics/shoewear modification, bracing/immobilization, injections, and physical therapy. Surgically, we have discussed a right subtalar fusion with SHREYA. He does wish to proceed with surgery as planned. Orders/referrals were placed as below at today's visit. I ordered a bone stimulator today (ultrasound-based) for the patient. The patient is at increased risk for nonunion, given the following set of issues:  history of prior nonunion and history of smoking. Given this specific set of circumstances for this patient, I believe the risks of nonunion and the consequences of that (ie, the impact on his life) more than justify the use of a bone stimulator as a medically necessary device for this patient. Obtaining osseous union may also help avoid a possible future surgical intervention. We spoke about the risks/benefits/alternatives to a surgical intervention. They understand that the risks of surgery may include but are not limited to pain, infection, bleeding, blood clot, damage to soft tissue/vessel/nerve, future surgery, scarring/stiffness, decreased strength/weakness, cosmetic deformity, neuroma/neuritis/phantom pains, delayed soft tissue/bone healing, nonunion, malunion, failure of hardware/fixation/surgery, iatrogenic fracture/dislocation, damage to bone/joint(s), worsening of condition, recurrence, limb length discrepancy, avascular necrosis of bone, neurovascular compromise/compartment syndrome, tourniquet complications, failure of surgery, dissatisfaction with outcome, loss of limb, stroke, heart attack, pulmonary embolus, mental status change, anesthesia risks/reaction, and even death. They expressed verbal understanding of the risks and wish to proceed with surgical intervention. All questions were answered. No guarantees were made or implied. Informed consent was obtained.      The patient was counseled about the risks of sonia Covid-19 during the perioperative period and any recovery window from their procedure. The patient was made aware that sonia Covid-19 may worsen their prognosis for recovering from their procedure and lend to a higher morbidity and/or mortality risk. All material risks, benefits, and reasonable alternatives including postponing the procedure were discussed. The patient does wish to proceed with their procedure at this time. We also had a discussion about the risk of blood clot and thromboembolic events. The patient understands that there is an increased risk with surgery/immobilization, and understands nothing will completely eliminate the risk of DVT/PE's, and that any prophylactic medication does not substitute for early mobilization. Given his risk profile, I have recommended the following strategy to decrease the risk of blood clots, and the patient agrees and wishes to proceed:  Aspirin 325 mg PO q Day. All questions were answered and the patient agrees with the above plan. The patient will return to clinic postoperatively. No follow-ups on file. No orders of the defined types were placed in this encounter. No orders of the defined types were placed in this encounter. Ryan Jenkins MD  Orthopedic Surgery, Foot and Ankle        Please excuse any typos/errors, as this note was created with the assistance of voice recognition software. While intending to generate a document that actually reflects the content of the visit, the document can still have some errors including those of syntax and sound-a-like substitutions which may escape proof reading. In such instances, actual meaning can be extrapolated by context.

## 2020-11-08 ENCOUNTER — HOSPITAL ENCOUNTER (OUTPATIENT)
Dept: PREADMISSION TESTING | Age: 65
Setting detail: SPECIMEN
Discharge: HOME | End: 2020-11-12
Payer: COMMERCIAL

## 2020-11-08 PROCEDURE — U0003 INFECTIOUS AGENT DETECTION BY NUCLEIC ACID (DNA OR RNA); SEVERE ACUTE RESPIRATORY SYNDROME CORONAVIRUS 2 (SARS-COV-2) (CORONAVIRUS DISEASE [COVID-19]), AMPLIFIED PROBE TECHNIQUE, MAKING USE OF HIGH THROUGHPUT TECHNOLOGIES AS DESCRIBED BY CMS-2020-01-R: HCPCS

## 2020-11-11 ENCOUNTER — ANESTHESIA EVENT (OUTPATIENT)
Dept: OPERATING ROOM | Age: 65
End: 2020-11-11
Payer: COMMERCIAL

## 2020-11-11 LAB — SARS-COV-2, NAA: NOT DETECTED

## 2020-11-12 ENCOUNTER — APPOINTMENT (OUTPATIENT)
Dept: GENERAL RADIOLOGY | Age: 65
End: 2020-11-12
Attending: ORTHOPAEDIC SURGERY
Payer: COMMERCIAL

## 2020-11-12 ENCOUNTER — HOSPITAL ENCOUNTER (OUTPATIENT)
Age: 65
Setting detail: OUTPATIENT SURGERY
Discharge: HOME | End: 2020-11-12
Attending: ORTHOPAEDIC SURGERY | Admitting: ORTHOPAEDIC SURGERY
Payer: COMMERCIAL

## 2020-11-12 ENCOUNTER — ANESTHESIA (OUTPATIENT)
Dept: OPERATING ROOM | Age: 65
End: 2020-11-12
Payer: COMMERCIAL

## 2020-11-12 VITALS
DIASTOLIC BLOOD PRESSURE: 82 MMHG | OXYGEN SATURATION: 93 % | TEMPERATURE: 97.5 F | RESPIRATION RATE: 13 BRPM | BODY MASS INDEX: 40.73 KG/M2 | WEIGHT: 275 LBS | HEIGHT: 69 IN | HEART RATE: 69 BPM | SYSTOLIC BLOOD PRESSURE: 133 MMHG

## 2020-11-12 VITALS — SYSTOLIC BLOOD PRESSURE: 85 MMHG | TEMPERATURE: 97.9 F | DIASTOLIC BLOOD PRESSURE: 53 MMHG | OXYGEN SATURATION: 98 %

## 2020-11-12 LAB
GLUCOSE BLD-MCNC: 129 MG/DL (ref 75–110)
GLUCOSE BLD-MCNC: 145 MG/DL (ref 75–110)

## 2020-11-12 PROCEDURE — 27606 INCISION OF ACHILLES TENDON: CPT | Performed by: ORTHOPAEDIC SURGERY

## 2020-11-12 PROCEDURE — C9290 INJ, BUPIVACAINE LIPOSOME: HCPCS | Performed by: ANESTHESIOLOGY

## 2020-11-12 PROCEDURE — 6370000000 HC RX 637 (ALT 250 FOR IP): Performed by: ORTHOPAEDIC SURGERY

## 2020-11-12 PROCEDURE — 6360000002 HC RX W HCPCS: Performed by: NURSE ANESTHETIST, CERTIFIED REGISTERED

## 2020-11-12 PROCEDURE — 3209999900 FLUORO FOR SURGICAL PROCEDURES

## 2020-11-12 PROCEDURE — 6360000002 HC RX W HCPCS: Performed by: ANESTHESIOLOGY

## 2020-11-12 PROCEDURE — 2500000003 HC RX 250 WO HCPCS: Performed by: ANESTHESIOLOGY

## 2020-11-12 PROCEDURE — 7100000001 HC PACU RECOVERY - ADDTL 15 MIN: Performed by: ORTHOPAEDIC SURGERY

## 2020-11-12 PROCEDURE — 2720000010 HC SURG SUPPLY STERILE: Performed by: ORTHOPAEDIC SURGERY

## 2020-11-12 PROCEDURE — C1734 ORTH/DEVIC/DRUG BN/BN,TIS/BN: HCPCS | Performed by: ORTHOPAEDIC SURGERY

## 2020-11-12 PROCEDURE — 2500000003 HC RX 250 WO HCPCS: Performed by: NURSE ANESTHETIST, CERTIFIED REGISTERED

## 2020-11-12 PROCEDURE — 7100000000 HC PACU RECOVERY - FIRST 15 MIN: Performed by: ORTHOPAEDIC SURGERY

## 2020-11-12 PROCEDURE — C1713 ANCHOR/SCREW BN/BN,TIS/BN: HCPCS | Performed by: ORTHOPAEDIC SURGERY

## 2020-11-12 PROCEDURE — 7100000011 HC PHASE II RECOVERY - ADDTL 15 MIN: Performed by: ORTHOPAEDIC SURGERY

## 2020-11-12 PROCEDURE — C9359 IMPLNT,BON VOID FILLER-PUTTY: HCPCS | Performed by: ORTHOPAEDIC SURGERY

## 2020-11-12 PROCEDURE — 3600000013 HC SURGERY LEVEL 3 ADDTL 15MIN: Performed by: ORTHOPAEDIC SURGERY

## 2020-11-12 PROCEDURE — 2580000003 HC RX 258: Performed by: ANESTHESIOLOGY

## 2020-11-12 PROCEDURE — 2500000003 HC RX 250 WO HCPCS: Performed by: ORTHOPAEDIC SURGERY

## 2020-11-12 PROCEDURE — 2709999900 HC NON-CHARGEABLE SUPPLY: Performed by: ORTHOPAEDIC SURGERY

## 2020-11-12 PROCEDURE — 6370000000 HC RX 637 (ALT 250 FOR IP): Performed by: ANESTHESIOLOGY

## 2020-11-12 PROCEDURE — 3700000000 HC ANESTHESIA ATTENDED CARE: Performed by: ORTHOPAEDIC SURGERY

## 2020-11-12 PROCEDURE — 7100000010 HC PHASE II RECOVERY - FIRST 15 MIN: Performed by: ORTHOPAEDIC SURGERY

## 2020-11-12 PROCEDURE — 28725 ARTHRODESIS SUBTALAR: CPT | Performed by: ORTHOPAEDIC SURGERY

## 2020-11-12 PROCEDURE — 3700000001 HC ADD 15 MINUTES (ANESTHESIA): Performed by: ORTHOPAEDIC SURGERY

## 2020-11-12 PROCEDURE — 64445 NJX AA&/STRD SCIATIC NRV IMG: CPT | Performed by: ANESTHESIOLOGY

## 2020-11-12 PROCEDURE — 82947 ASSAY GLUCOSE BLOOD QUANT: CPT

## 2020-11-12 PROCEDURE — 3600000003 HC SURGERY LEVEL 3 BASE: Performed by: ORTHOPAEDIC SURGERY

## 2020-11-12 PROCEDURE — 2580000003 HC RX 258: Performed by: NURSE ANESTHETIST, CERTIFIED REGISTERED

## 2020-11-12 DEVICE — CANNULATED SCREW
Type: IMPLANTABLE DEVICE | Site: ANKLE | Status: FUNCTIONAL
Brand: ASNIS

## 2020-11-12 DEVICE — C BONE PUTTY WITH DBM AND CANCELLOUS BONE CHIPS
Type: IMPLANTABLE DEVICE | Site: ANKLE | Status: FUNCTIONAL
Brand: ALLOMATRIX

## 2020-11-12 DEVICE — TIM-GRAFT BONE AUGMENT 3ML INJ: Type: IMPLANTABLE DEVICE | Site: ANKLE | Status: FUNCTIONAL

## 2020-11-12 DEVICE — ASNIS III SCREW TI
Type: IMPLANTABLE DEVICE | Site: ANKLE | Status: FUNCTIONAL
Brand: ASNIS

## 2020-11-12 RX ORDER — ASPIRIN 325 MG
325 TABLET, DELAYED RELEASE (ENTERIC COATED) ORAL DAILY
Qty: 42 TABLET | Refills: 0 | Status: SHIPPED | OUTPATIENT
Start: 2020-11-12 | End: 2021-02-15

## 2020-11-12 RX ORDER — FENTANYL CITRATE 50 UG/ML
25 INJECTION, SOLUTION INTRAMUSCULAR; INTRAVENOUS EVERY 5 MIN PRN
Status: DISCONTINUED | OUTPATIENT
Start: 2020-11-12 | End: 2020-11-12 | Stop reason: HOSPADM

## 2020-11-12 RX ORDER — OXYCODONE HYDROCHLORIDE AND ACETAMINOPHEN 5; 325 MG/1; MG/1
1 TABLET ORAL EVERY 6 HOURS PRN
Qty: 20 TABLET | Refills: 0 | Status: SHIPPED | OUTPATIENT
Start: 2020-11-12 | End: 2020-11-19

## 2020-11-12 RX ORDER — CLINDAMYCIN PHOSPHATE 900 MG/50ML
900 INJECTION INTRAVENOUS ONCE
Status: COMPLETED | OUTPATIENT
Start: 2020-11-12 | End: 2020-11-12

## 2020-11-12 RX ORDER — PROPOFOL 10 MG/ML
INJECTION, EMULSION INTRAVENOUS PRN
Status: DISCONTINUED | OUTPATIENT
Start: 2020-11-12 | End: 2020-11-12 | Stop reason: SDUPTHER

## 2020-11-12 RX ORDER — LIDOCAINE HYDROCHLORIDE 10 MG/ML
1 INJECTION, SOLUTION EPIDURAL; INFILTRATION; INTRACAUDAL; PERINEURAL
Status: DISCONTINUED | OUTPATIENT
Start: 2020-11-12 | End: 2020-11-12 | Stop reason: HOSPADM

## 2020-11-12 RX ORDER — DOCUSATE SODIUM 100 MG/1
100 CAPSULE, LIQUID FILLED ORAL 2 TIMES DAILY PRN
Qty: 20 CAPSULE | Refills: 0 | Status: SHIPPED | OUTPATIENT
Start: 2020-11-12 | End: 2020-11-19

## 2020-11-12 RX ORDER — OXYCODONE HYDROCHLORIDE AND ACETAMINOPHEN 5; 325 MG/1; MG/1
1 TABLET ORAL PRN
Status: DISCONTINUED | OUTPATIENT
Start: 2020-11-12 | End: 2020-11-12 | Stop reason: HOSPADM

## 2020-11-12 RX ORDER — BUPIVACAINE HYDROCHLORIDE 5 MG/ML
INJECTION, SOLUTION EPIDURAL; INTRACAUDAL PRN
Status: DISCONTINUED | OUTPATIENT
Start: 2020-11-12 | End: 2020-11-12 | Stop reason: SDUPTHER

## 2020-11-12 RX ORDER — ACETAMINOPHEN 500 MG
1000 TABLET ORAL ONCE
Status: COMPLETED | OUTPATIENT
Start: 2020-11-12 | End: 2020-11-12

## 2020-11-12 RX ORDER — FENTANYL CITRATE 50 UG/ML
INJECTION, SOLUTION INTRAMUSCULAR; INTRAVENOUS PRN
Status: DISCONTINUED | OUTPATIENT
Start: 2020-11-12 | End: 2020-11-12 | Stop reason: SDUPTHER

## 2020-11-12 RX ORDER — HYDROMORPHONE HCL 110MG/55ML
0.5 PATIENT CONTROLLED ANALGESIA SYRINGE INTRAVENOUS EVERY 5 MIN PRN
Status: DISCONTINUED | OUTPATIENT
Start: 2020-11-12 | End: 2020-11-12 | Stop reason: HOSPADM

## 2020-11-12 RX ORDER — LIDOCAINE HYDROCHLORIDE 10 MG/ML
INJECTION, SOLUTION INFILTRATION; PERINEURAL PRN
Status: DISCONTINUED | OUTPATIENT
Start: 2020-11-12 | End: 2020-11-12 | Stop reason: SDUPTHER

## 2020-11-12 RX ORDER — ONDANSETRON 2 MG/ML
4 INJECTION INTRAMUSCULAR; INTRAVENOUS
Status: DISCONTINUED | OUTPATIENT
Start: 2020-11-12 | End: 2020-11-12 | Stop reason: HOSPADM

## 2020-11-12 RX ORDER — ONDANSETRON 4 MG/1
4 TABLET, FILM COATED ORAL EVERY 8 HOURS PRN
Qty: 20 TABLET | Refills: 0 | Status: SHIPPED | OUTPATIENT
Start: 2020-11-12 | End: 2020-11-19

## 2020-11-12 RX ORDER — MIDAZOLAM HYDROCHLORIDE 1 MG/ML
2 INJECTION INTRAMUSCULAR; INTRAVENOUS ONCE
Status: COMPLETED | OUTPATIENT
Start: 2020-11-12 | End: 2020-11-12

## 2020-11-12 RX ORDER — ROCURONIUM BROMIDE 10 MG/ML
INJECTION, SOLUTION INTRAVENOUS PRN
Status: DISCONTINUED | OUTPATIENT
Start: 2020-11-12 | End: 2020-11-12 | Stop reason: SDUPTHER

## 2020-11-12 RX ORDER — HYDRALAZINE HYDROCHLORIDE 20 MG/ML
5 INJECTION INTRAMUSCULAR; INTRAVENOUS EVERY 10 MIN PRN
Status: DISCONTINUED | OUTPATIENT
Start: 2020-11-12 | End: 2020-11-12 | Stop reason: HOSPADM

## 2020-11-12 RX ORDER — SODIUM CHLORIDE 0.9 % (FLUSH) 0.9 %
10 SYRINGE (ML) INJECTION PRN
Status: DISCONTINUED | OUTPATIENT
Start: 2020-11-12 | End: 2020-11-12 | Stop reason: HOSPADM

## 2020-11-12 RX ORDER — SODIUM CHLORIDE, SODIUM LACTATE, POTASSIUM CHLORIDE, CALCIUM CHLORIDE 600; 310; 30; 20 MG/100ML; MG/100ML; MG/100ML; MG/100ML
INJECTION, SOLUTION INTRAVENOUS CONTINUOUS PRN
Status: DISCONTINUED | OUTPATIENT
Start: 2020-11-12 | End: 2020-11-12 | Stop reason: SDUPTHER

## 2020-11-12 RX ORDER — MIDAZOLAM HYDROCHLORIDE 1 MG/ML
INJECTION INTRAMUSCULAR; INTRAVENOUS
Status: DISCONTINUED
Start: 2020-11-12 | End: 2020-11-12 | Stop reason: HOSPADM

## 2020-11-12 RX ORDER — PROMETHAZINE HYDROCHLORIDE 25 MG/ML
6.25 INJECTION, SOLUTION INTRAMUSCULAR; INTRAVENOUS
Status: DISCONTINUED | OUTPATIENT
Start: 2020-11-12 | End: 2020-11-12 | Stop reason: HOSPADM

## 2020-11-12 RX ORDER — OXYCODONE HYDROCHLORIDE AND ACETAMINOPHEN 5; 325 MG/1; MG/1
2 TABLET ORAL PRN
Status: DISCONTINUED | OUTPATIENT
Start: 2020-11-12 | End: 2020-11-12 | Stop reason: HOSPADM

## 2020-11-12 RX ORDER — SODIUM CHLORIDE 0.9 % (FLUSH) 0.9 %
10 SYRINGE (ML) INJECTION EVERY 12 HOURS SCHEDULED
Status: DISCONTINUED | OUTPATIENT
Start: 2020-11-12 | End: 2020-11-12 | Stop reason: HOSPADM

## 2020-11-12 RX ORDER — SODIUM CHLORIDE 9 MG/ML
INJECTION, SOLUTION INTRAVENOUS CONTINUOUS
Status: DISCONTINUED | OUTPATIENT
Start: 2020-11-13 | End: 2020-11-12

## 2020-11-12 RX ORDER — LIDOCAINE HYDROCHLORIDE 20 MG/ML
INJECTION, SOLUTION EPIDURAL; INFILTRATION; INTRACAUDAL; PERINEURAL PRN
Status: DISCONTINUED | OUTPATIENT
Start: 2020-11-12 | End: 2020-11-12 | Stop reason: SDUPTHER

## 2020-11-12 RX ORDER — ONDANSETRON 2 MG/ML
INJECTION INTRAMUSCULAR; INTRAVENOUS PRN
Status: DISCONTINUED | OUTPATIENT
Start: 2020-11-12 | End: 2020-11-12 | Stop reason: SDUPTHER

## 2020-11-12 RX ORDER — SODIUM CHLORIDE, SODIUM LACTATE, POTASSIUM CHLORIDE, CALCIUM CHLORIDE 600; 310; 30; 20 MG/100ML; MG/100ML; MG/100ML; MG/100ML
INJECTION, SOLUTION INTRAVENOUS CONTINUOUS
Status: DISCONTINUED | OUTPATIENT
Start: 2020-11-13 | End: 2020-11-12 | Stop reason: HOSPADM

## 2020-11-12 RX ORDER — GINSENG 100 MG
CAPSULE ORAL PRN
Status: DISCONTINUED | OUTPATIENT
Start: 2020-11-12 | End: 2020-11-12 | Stop reason: ALTCHOICE

## 2020-11-12 RX ADMIN — PHENYLEPHRINE HYDROCHLORIDE 100 MCG: 10 INJECTION INTRAVENOUS at 14:54

## 2020-11-12 RX ADMIN — MIDAZOLAM 2 MG: 1 INJECTION INTRAMUSCULAR; INTRAVENOUS at 13:04

## 2020-11-12 RX ADMIN — LIDOCAINE HYDROCHLORIDE 60 MG: 20 INJECTION, SOLUTION EPIDURAL; INFILTRATION; INTRACAUDAL; PERINEURAL at 13:45

## 2020-11-12 RX ADMIN — PROPOFOL 70 MG: 10 INJECTION, EMULSION INTRAVENOUS at 15:52

## 2020-11-12 RX ADMIN — SODIUM CHLORIDE, POTASSIUM CHLORIDE, SODIUM LACTATE AND CALCIUM CHLORIDE: 600; 310; 30; 20 INJECTION, SOLUTION INTRAVENOUS at 10:55

## 2020-11-12 RX ADMIN — ROCURONIUM BROMIDE 50 MG: 10 INJECTION, SOLUTION INTRAVENOUS at 13:45

## 2020-11-12 RX ADMIN — SODIUM CHLORIDE, POTASSIUM CHLORIDE, SODIUM LACTATE AND CALCIUM CHLORIDE: 600; 310; 30; 20 INJECTION, SOLUTION INTRAVENOUS at 15:29

## 2020-11-12 RX ADMIN — Medication 100 MCG: at 13:45

## 2020-11-12 RX ADMIN — CLINDAMYCIN PHOSPHATE 900 MG: 900 INJECTION, SOLUTION INTRAVENOUS at 13:55

## 2020-11-12 RX ADMIN — BUPIVACAINE HYDROCHLORIDE 20 ML: 5 INJECTION, SOLUTION EPIDURAL; INTRACAUDAL; PERINEURAL at 13:04

## 2020-11-12 RX ADMIN — PHENYLEPHRINE HYDROCHLORIDE 100 MCG: 10 INJECTION INTRAVENOUS at 14:28

## 2020-11-12 RX ADMIN — BUPIVACAINE 10 ML: 13.3 INJECTION, SUSPENSION, LIPOSOMAL INFILTRATION at 13:04

## 2020-11-12 RX ADMIN — ACETAMINOPHEN 1000 MG: 500 TABLET ORAL at 10:53

## 2020-11-12 RX ADMIN — ONDANSETRON 4 MG: 2 INJECTION, SOLUTION INTRAMUSCULAR; INTRAVENOUS at 15:41

## 2020-11-12 RX ADMIN — PHENYLEPHRINE HYDROCHLORIDE 200 MCG: 10 INJECTION INTRAVENOUS at 14:02

## 2020-11-12 RX ADMIN — PHENYLEPHRINE HYDROCHLORIDE 100 MCG: 10 INJECTION INTRAVENOUS at 14:46

## 2020-11-12 RX ADMIN — PHENYLEPHRINE HYDROCHLORIDE 100 MCG: 10 INJECTION INTRAVENOUS at 14:18

## 2020-11-12 RX ADMIN — LIDOCAINE HYDROCHLORIDE 3 ML: 10 INJECTION, SOLUTION INFILTRATION; PERINEURAL at 13:04

## 2020-11-12 RX ADMIN — PROPOFOL 200 MG: 10 INJECTION, EMULSION INTRAVENOUS at 13:45

## 2020-11-12 RX ADMIN — SODIUM CHLORIDE, POTASSIUM CHLORIDE, SODIUM LACTATE AND CALCIUM CHLORIDE: 600; 310; 30; 20 INJECTION, SOLUTION INTRAVENOUS at 13:40

## 2020-11-12 RX ADMIN — PHENYLEPHRINE HYDROCHLORIDE 100 MCG: 10 INJECTION INTRAVENOUS at 15:04

## 2020-11-12 ASSESSMENT — PULMONARY FUNCTION TESTS
PIF_VALUE: 27
PIF_VALUE: 29
PIF_VALUE: 15
PIF_VALUE: 26
PIF_VALUE: 27
PIF_VALUE: 25
PIF_VALUE: 14
PIF_VALUE: 25
PIF_VALUE: 26
PIF_VALUE: 27
PIF_VALUE: 22
PIF_VALUE: 27
PIF_VALUE: 25
PIF_VALUE: 14
PIF_VALUE: 23
PIF_VALUE: 26
PIF_VALUE: 25
PIF_VALUE: 23
PIF_VALUE: 13
PIF_VALUE: 26
PIF_VALUE: 14
PIF_VALUE: 34
PIF_VALUE: 25
PIF_VALUE: 26
PIF_VALUE: 28
PIF_VALUE: 14
PIF_VALUE: 25
PIF_VALUE: 15
PIF_VALUE: 26
PIF_VALUE: 14
PIF_VALUE: 26
PIF_VALUE: 26
PIF_VALUE: 25
PIF_VALUE: 27
PIF_VALUE: 26
PIF_VALUE: 1
PIF_VALUE: 26
PIF_VALUE: 14
PIF_VALUE: 25
PIF_VALUE: 25
PIF_VALUE: 26
PIF_VALUE: 26
PIF_VALUE: 25
PIF_VALUE: 26
PIF_VALUE: 25
PIF_VALUE: 26
PIF_VALUE: 26
PIF_VALUE: 31
PIF_VALUE: 25
PIF_VALUE: 26
PIF_VALUE: 25
PIF_VALUE: 26
PIF_VALUE: 25
PIF_VALUE: 18
PIF_VALUE: 1
PIF_VALUE: 26
PIF_VALUE: 25
PIF_VALUE: 26
PIF_VALUE: 27
PIF_VALUE: 25
PIF_VALUE: 25
PIF_VALUE: 13
PIF_VALUE: 1
PIF_VALUE: 26
PIF_VALUE: 13
PIF_VALUE: 26
PIF_VALUE: 26
PIF_VALUE: 25
PIF_VALUE: 25
PIF_VALUE: 26
PIF_VALUE: 24
PIF_VALUE: 26
PIF_VALUE: 22
PIF_VALUE: 23
PIF_VALUE: 26
PIF_VALUE: 25
PIF_VALUE: 16
PIF_VALUE: 26
PIF_VALUE: 14
PIF_VALUE: 26
PIF_VALUE: 13
PIF_VALUE: 23
PIF_VALUE: 27
PIF_VALUE: 26
PIF_VALUE: 26
PIF_VALUE: 33
PIF_VALUE: 18
PIF_VALUE: 14
PIF_VALUE: 26
PIF_VALUE: 26
PIF_VALUE: 14
PIF_VALUE: 1
PIF_VALUE: 21
PIF_VALUE: 25
PIF_VALUE: 23
PIF_VALUE: 26
PIF_VALUE: 26
PIF_VALUE: 24
PIF_VALUE: 23
PIF_VALUE: 26
PIF_VALUE: 25
PIF_VALUE: 25
PIF_VALUE: 11
PIF_VALUE: 26
PIF_VALUE: 26
PIF_VALUE: 27
PIF_VALUE: 4
PIF_VALUE: 24
PIF_VALUE: 27
PIF_VALUE: 26
PIF_VALUE: 25
PIF_VALUE: 14
PIF_VALUE: 24
PIF_VALUE: 25
PIF_VALUE: 26
PIF_VALUE: 25
PIF_VALUE: 26
PIF_VALUE: 26
PIF_VALUE: 25
PIF_VALUE: 26
PIF_VALUE: 14
PIF_VALUE: 25
PIF_VALUE: 26
PIF_VALUE: 25
PIF_VALUE: 26
PIF_VALUE: 26
PIF_VALUE: 15
PIF_VALUE: 26
PIF_VALUE: 1

## 2020-11-12 ASSESSMENT — PAIN - FUNCTIONAL ASSESSMENT: PAIN_FUNCTIONAL_ASSESSMENT: 0-10

## 2020-11-12 ASSESSMENT — PAIN SCALES - GENERAL
PAINLEVEL_OUTOF10: 0
PAINLEVEL_OUTOF10: 2
PAINLEVEL_OUTOF10: 0
PAINLEVEL_OUTOF10: 0

## 2020-11-12 ASSESSMENT — PAIN DESCRIPTION - DESCRIPTORS: DESCRIPTORS: ACHING

## 2020-11-12 NOTE — ANESTHESIA PRE PROCEDURE
Department of Anesthesiology  Preprocedure Note       Name:  Ry Rincon   Age:  77 y.o.  :  1955                                          MRN:  [id number]         Date:  2020      Surgeon: Deshawn Vera):  Cecilia Alcantara MD    Procedure: Procedure(s):  RIGHT SUBTALAR FUSION, SHREYA, POSSIBLE CALCANEAL OSTEOTOMY- CATARINA    Medications prior to admission:   Prior to Admission medications    Medication Sig Start Date End Date Taking?  Authorizing Provider   metoprolol succinate (TOPROL XL) 25 MG extended release tablet Take 1 tablet by mouth daily 20  Yes Gus Lesch, MD   hydroCHLOROthiazide (HYDRODIURIL) 25 MG tablet TAKE 1 TABLET DAILY 20  Yes Gus Lesch, MD   SITagliptin-metFORMIN (JANUMET XR)  MG TB24 per extended release tablet Take 1 tablet by mouth every other day 20  Yes MILLIE Nicole NP   telmisartan (MICARDIS) 40 MG tablet TAKE 1 TABLET DAILY  Patient taking differently: nightly  20  Yes MILLIE Nicole NP   erythromycin (ROMYCIN) 5 MG/GM ophthalmic ointment  20  Yes Historical Provider, MD   pantoprazole (PROTONIX) 40 MG tablet TAKE 1 TABLET DAILY 19  Yes MILLIE Montiel CNP   atorvastatin (LIPITOR) 80 MG tablet TAKE 1 TABLET DAILY 19  Yes MILLIE Montiel CNP   B Complex Vitamins (VITAMIN-B COMPLEX) TABS Take 1 tablet by mouth   Yes Historical Provider, MD   prednisoLONE acetate (PRED FORTE) 1 % ophthalmic suspension Place 1 drop into the right eye nightly    Yes Historical Provider, MD   Multiple Vitamins-Minerals (THERAPEUTIC MULTIVITAMIN-MINERALS) tablet Take 1 tablet by mouth daily   Yes Historical Provider, MD   sertraline (ZOLOFT) 100 MG tablet Take 100 mg by mouth nightly Indications: Depression    Yes Historical Provider, MD   fluticasone (FLONASE) 50 MCG/ACT nasal spray 2 sprays by Nasal route daily  Patient taking differently: 2 sprays by Nasal route nightly as needed  4/24/15  Yes Eloy Sandoval bupivacaine liposome (EXPAREL) 1.3 % injection              Facility-Administered Medications Ordered in Other Encounters   Medication Dose Route Frequency Provider Last Rate Last Dose    phenylephrine (ANMOL-SYNEPHRINE) injection    PRN MILLIE Guzman - CRNA   200 mcg at 11/12/20 1402    clindamycin (CLEOCIN) 900 mg in dextrose 5% 50 mL IVPB  900 mg Intravenous Once Bryant Hurtado MD        lactated ringers infusion 1,000 mL  1,000 mL Intravenous Continuous Kenny Jacques MD 50 mL/hr at 03/10/16 1334 1,000 mL at 03/10/16 1334    sodium chloride flush 0.9 % injection 10 mL  10 mL Intravenous 2 times per day Manju Figueroa MD        sodium chloride flush 0.9 % injection 10 mL  10 mL Intravenous PRN Thien Boland MD        fentaNYL (SUBLIMAZE) injection 25 mcg  25 mcg Intravenous Q5 Min PRN Thien Boland MD        ropivacaine 0.2% (NAROPIN) elastomeric infusion 550 mL  550 mL Infiltration Continuous Manju Figueroa MD 8 mL/hr at 03/10/16 1229 550 mL at 03/10/16 1229       Allergies: Allergies   Allergen Reactions    Other Itching and Other (See Comments)     EKG patches-- red spot lasting for a week    Penicillins Hives and Other (See Comments)     Other reaction(s): Intolerance-unknown    Sulfa Antibiotics Hives     Other reaction(s): Intolerance-unknown    Tetanus Immune Globulin Hives     Other reaction(s):  Intolerance-unknown    Tetanus Toxoids Hives    Timolol Maleate Swelling     Swelling of eyes       Problem List:    Patient Active Problem List   Diagnosis Code    Benign hypertension I10    Other hyperlipidemia E78.49    Depression F32.9    Benign prostatic hyperplasia with urinary obstruction N40.1, N13.8    Other urinary incontinence N39.498    Morbid obesity with BMI of 40.0-44.9, adult ([facility] E66.01, Z68.41    Controlled type 2 diabetes mellitus without complication, without long-term current use of insulin (HCC) E11.9    Closed fracture of one rib S22.39XA    Presence of cardiac pacemaker Z95.0    KIM (obstructive sleep apnea) G47.33    Class 3 obesity in adult [id number]    Viral URI J06.9       Past Medical History:        Diagnosis Date    Allergic rhinitis     Anxiety     Arrhythmia     Arthritis     Atrial fibrillation (HCC)     on Pacemaker, No Blood Thinner, 2 Ablation    Benign hypertension 2015    Blind right eye     Caffeine use     3 cups coffee day    Cough     Clear sputum, due to sinus drainage per pt.  Depression     Diabetes mellitus ([facility]     GERD (gastroesophageal reflux disease)     Glaucoma     Hyperlipidemia LDL goal < 100 2015    Hypertrophy of prostate with urinary obstruction and other lower urinary tract symptoms (LUTS)     Morbid obesity ([address] 2015    Mumps     as a child    Unspecified sleep apnea     bi pap use    Wears glasses        Past Surgical History:        Procedure Laterality Date    ANKLE ARTHROSCOPY Right 2017    WITH DEBRIDEMENT     ANKLE ARTHROSCOPY Right 2017    ANKLE ARTHROSCOPY WITH DEBRIDEMENT  performed by Cherri Gusman MD at [address] [place] [address]  2012 ?     X2, [address] Right multiple    corneal transplant x3    CYSTOSCOPY      TURBT 2010    ELBOW SURGERY Right 1967    FOOT SURGERY Right 2016    remove hardware--2 screws    PACEMAKER PLACEMENT  2016    [facility], [phone], Dr. Lorrin Mortimer Right 2015    VASECTOMY         Social History:    Social History     Tobacco Use    Smoking status: Former Smoker     Packs/day: 1.50     Years: 20.00     Pack years: 30.00     Types: Cigarettes     Start date:      Last attempt to quit:      Years since quittin.8    Smokeless tobacco: Former User    Tobacco comment: quit    Substance Use Topics    Alcohol use: No     Alcohol/week: 0.0 standard drinks                                Counseling given: Not Answered  Comment: quit 2000      Vital Signs (Current):   Vitals:    11/12/20 1036 11/12/20 1042 11/12/20 1300 11/12/20 1305   BP: 109/74  122/73 123/73   Pulse: 65  64 63   Resp: 20   16   Temp: 96.9 °F (36.1 °C)      TempSrc: Temporal      SpO2: 96%  98% 97%   Weight:  275 lb (124.7 kg)     Height:  5' 9\" (1.753 m)                                                BP Readings from Last 3 Encounters:   11/12/20 123/73   10/27/20 115/68   10/15/20 122/84       NPO Status: Time of last liquid consumption: 2100                        Time of last solid consumption: 2100                        Date of last liquid consumption: 11/11/20                        Date of last solid food consumption: 11/11/20    BMI:   Wt Readings from Last 3 Encounters:   11/12/20 275 lb (124.7 kg)   11/06/20 275 lb (124.7 kg)   10/27/20 275 lb (124.7 kg)     Body mass index is 40.61 kg/m².     CBC:   Lab Results   Component Value Date    WBC 6.3 10/27/2020    RBC 4.77 10/27/2020    HGB 14.6 10/27/2020    HCT 42.8 10/27/2020    MCV 89.7 10/27/2020    RDW 12.2 10/27/2020     10/27/2020       CMP:   Lab Results   Component Value Date     10/27/2020    K 3.9 10/27/2020     10/27/2020    CO2 27 10/27/2020    BUN 16 10/27/2020    CREATININE 1.03 10/27/2020    GFRAA >60 10/27/2020    LABGLOM >60 10/27/2020    GLUCOSE 172 10/27/2020    CALCIUM 9.2 10/27/2020    BILITOT 1.1 05/29/2015    ALKPHOS 78 05/29/2015    AST 31 05/29/2015    ALT 54 05/29/2015       POC Tests:   Recent Labs     11/12/20  1057   POCGLU 129*       Coags: No results found for: PROTIME, INR, APTT    HCG (If Applicable): No results found for: PREGTESTUR, PREGSERUM, HCG, HCGQUANT     ABGs: No results found for: PHART, PO2ART, XQH5FWE, ZIW4NRS, BEART, H4WNIRNV     Type & Screen (If Applicable):  No results found for: LABABO, LABRH    Drug/Infectious Status (If Applicable):  No results found for: HIV, HEPCAB    COVID-19 Screening (If Applicable):   Lab Results Component Value Date    COVID19 Not Detected 11/08/2020         Anesthesia Evaluation  Patient summary reviewed and Nursing notes reviewed no history of anesthetic complications:   Airway: Mallampati: II  TM distance: >3 FB   Neck ROM: full  Mouth opening: > = 3 FB Dental: normal exam         Pulmonary:normal exam    (+) sleep apnea:      (-) COPD and asthma                           Cardiovascular:  Exercise tolerance: no interval change,   (+) hypertension:,     (-) past MI, CAD and CABG/stent        Rate: normal                    Neuro/Psych:   (+) psychiatric history:            GI/Hepatic/Renal:   (+) GERD:,           Endo/Other:    (+) Diabetes, . Abdominal:           Vascular:                                        Anesthesia Plan      general     ASA 3       Induction: intravenous. Anesthetic plan and risks discussed with patient. Plan discussed with CRNA.     Attending anesthesiologist reviewed and agrees with Pre Eval content              Augusta Lombardi DO   11/12/2020

## 2020-11-12 NOTE — OP NOTE
[place] [address]  Dept: [phone]: [phone]      Orthopedic Surgery Operative Report      Patient: Baljeet Dinero      MRN#: [id number]     YOB: 1955      Date of Admission: 11/12/2020    Attending Surgeon: Cindi Arreguin M.D.   PCP: Ainsley Bush MD          Preoperative Diagnosis:   1. 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 Right ankle equinus contracture  3. CAD with h/o Afib s/p ablation + pacemaker  4. Diabetes  5. H/o tobacco use  6. Body mass index is 40.61 kg/m². Postoperative Diagnosis:   1. Same as above    Procedures Performed:  (11/12/2020)  1. Right foot subtalar fusion   2. Right percutaneous tendoachilles lengthening, performed through separate incision     MODIFIER 22: The work performed in this case was substantially greater than typically required due to the following factor(s): Morbid obesity, anatomic abnormality (as above malunion), presence of significant scar tissue and history of prior surgery/surgeries contributing to extensive joint deformity, requiring significantly more work to appropriately prepare joint. Implants:    [place] 5.0 mm fully threaded screw and 2x 6.5 mm partially threaded cannulated screws  Implant Name Type Inv.  Item Serial No.  Lot No. LRB No. Used Action   DOMINGA-GRAFT BONE AUGMENT 3ML INJ Bone/Graft/Tissue/Human/Synth DOMINGA-GRAFT BONE AUGMENT 3ML INJ  [facility] [employer] [id number] Right 1 Implanted   GRAFT BNE SUB SM 5CC DBM BIOCOMPOSITE OSTEOSET CANC CHIP - [id number]  GRAFT BNE SUB SM 5CC DBM BIOCOMPOSITE OSTEOSET CANC CHIP [id number] [employer] [facility]-  Right 1 Implanted   SCREW BNE L80MM DIA6.5MM CANC NAHEED TI SELF DRL ST JOVANNI  SCREW BNE L80MM DIA6.5MM CANC NAHEED TI SELF DRL ST JOVANNI  [employer] [facility]  Right 1 Implanted   SCREW BNE L75MM DIA6.5MM THRD L20MM TI ALLY SELF DRL ST  SCREW BNE L75MM DIA6.5MM THRD L20MM TI ALLY SELF DRL ST  [employer]  Right 1 Implanted   SCREW BNE L46MM DIA5MM TI ALLY SELF DRL ST JOVANNI FULL THRD LO  SCREW BNE L46MM DIA5MM TI ALLY SELF DRL ST JOVANNI FULL THRD LO  [employer] CHEN[employer]WD  Right 1 Implanted         Attending Surgeon:     Serafin Powell MD      Assistant Surgeon:    Resident: Koki Downs DO      Anesthesia:    General      Staff:  Surgeon(s):  Manuel Bravo MD  Scrub Person First: Cherelle Rodas  Anesthesia: Augusta Lombardi DO      Estimated Blood Loss:    <49      Complications:    None      Specimen:    * No specimens in log *        History:    Mr. Getachew Deleon is a 77 y.o. male who was seen recently for the above problem. He has right severe subtalar joint arthritis (status post attempted subtalar fusion with subsequent removal of hardware, with nonunion of his subtalar joint), along with ipsilateral ankle arthritis with anterior impingement, as sequelae of a fall from a ladder in  while at work.     Notably, he has a somewhat complex past medical history including but not limited to the following:  Atrial fibrillation (status post ablation plus pacemaker; not taking a blood thinner), KIM, obesity (BMI greater than 40), non-insulin-dependent diabetes, and tobacco use (reports he smokes 1 cigar/day).   His most recent hemoglobin A1c above. He is currently medically stable and appropriate for the planned procedure. We have spoken about the risks/benefits/alternatives to a surgical intervention, verbal understanding of these risks was expressed, and informed consent was obtained. All questions were answered. No guarantees were made or implied. I have answered all questions, and they wish to proceed with surgical intervention. Operative Note:    The patient was identified in the preoperative holding area by name, MRN, and .  The surgical site was verified and marked according to AAOS guidelines. The patient was taken to the operating room and placed on the operating table in a supine position. After achieving adequate sedation by the anesthesia team, the patient was then carefully positioned and all bony prominences were then padded. A tourniquet was placed on the ipsilateral thigh, and then the operative extremity was prepped and draped in the usual sterile fashion. A timeout was held in which the patient's identity, surgical site, procedure, allergies, and antibiotics were verified, and all in the room were in agreement, and thus the procedure began. The procedure began with a triple hemisection tendo-Achilles lengthening to allow more dorsiflexion and help neutralize the talus. This was accomplished by elevating the leg off the bed and using a 15-blade to release the tendon at three percutaneous stab incisions, approximately 3 cm apart. After completing this, improved dorsiflexion was immediately noted. The leg was then returned to its position on the bed. The leg was exsanguinated to the level of the tourniquet, and the tourniquet was then elevated to 275 mm Hg. The total tourniquet time was 103 minutes. Attention was turned to the subtalar joint. An oblique incision was marked out over the subtalar joint, from the tip of the fibula extending distally to the lateral midfoot. The skin was incised sharply and vessels were cauterized. Careful dissection was performed to avoid damaging the peroneal tendons, and remain out of the way of the sural nerve territory. The subtalar joint was then identified. Osteophytes were removed laterally at the joint line. The joint was gently distracted to expose its entire surface. With the subtalar joint exposed, significant arthrosis was noted, with osteophyte growth and cartilage degeneration.     The subtalar joint was prepared for fusion by denuding all the cartilage from the joint and exposing the subchondral plate. Cartilage was removed using osteotomes/curettes, and the joint was then irrigated to remove the debris. The subchondral plate was then fenestrated to stimulate healing and the surface was prepared with a high speed bur. Bone graft [employer] [place] medical allomatrix plus PDGF Augment) was added and packed into place. The subtalar joint was manually reduced and provisionally pinned into place to check the alignment both clinically and on fluoroscopy. The joint was deemed to be in the appropriate position. Multiple guide wires were then advanced under fluoroscopic guidance to hold the hindfoot in the desired position. After fluoroscopy was used to verify the position of the guidewires, the screw lengths were measured. The subtalar joint was then fused using the following screws:  two 6.5 mm partially threaded cannulated screws and one fully threaded 5.0 mm cannulated screw. Good compression across the joint was noted upon insertion of the above hardware, lagging by design. The fixation was stable. The heel was again checked clinically and appeared to be in the appropriate position. Final radiographs were obtained and reviewed, showing the hardware was positioned safely. The foot and toes were all noted to be well perfused. The surrounding joints moved freely appropriately without crepitus. The fixation was stable. Copious sterile irrigation was used to rinse the operative sites, and a layered closure was performed using 0 vicryl for deep capsular closure, followed by 2-0 vicryl and 3-0 nylon, providing appropriate tension to the skin. The skin was cleaned and gently dried. Steri-Strips were then applied, and anti-bacterial ointment was applied on top of that, with Adaptic and fluffs. A sterile layer of cast padding was then applied.   A short leg splint was then applied with the ankle in neutral.    The patient was aroused from anesthesia without complication, and gently transferred to the bed and then transported to the postoperative area in a stable condition. The patient was noted to have tolerated the procedure well without complication. Postoperative Plan:         Precautions:  nonweight bearing x 8 weeks anticipated on the Right lower extremity   -             []  Physical Therapy/Home exercises       Immobilization:      [x]  Splint/Cast  []  CAM boot  []  Comfortable shoe    []  Other:      DVT ppx:   [x]  Early mobilization       [x]  Medication as prescribed (Aspirin 325 mg PO q Day)      []  No chemical ppx needed; mechanical only   -    Pain control:  Medication as prescribed (dosing and quantity) indicated for acute postoperative pain control   -    Special concerns:       []  Vitamin D -- due to relatively low level, the patient will take supplementation         []  Prealbumin -- due to relatively low level, the patient will take supplementation    [x]  Glucose control            [x]  Tobacco cessation     []  Pin pulling in office at 6 weeks anticipated    []  Follow up on pathology results    []  Follow up on culture results    [x]  Bone stimulator postoperatively        [x]  Avoid strenuous activity/pain provoking maneuvers and high-impact repetitive exercises    -    Disposition:   PACU      The patient has been instructed to follow up in the office. The particulars of surgery as well as the condition of the patient postoperatively were discussed with the patients family thereafter per the patient's wishes.            Keila Pearson MD  Orthopedic Surgery

## 2020-11-12 NOTE — H&P
History and Physical Update    Pt Name: Angela Gama  MRN: [id number]  YOB: 1955  Date of evaluation: 11/12/2020      [x] I have reviewed the Family Practice Progress Note by Dr Severa Barge dated 10/15/20 in epic which meets the criteria for an Interval History and Physical note and is attached below. [x] I have examined  [facility]  There are no changes to the patient who is scheduled for a right subtalar fusion, SHREYA, possible calcaneal osteotomy by Dr. Kaylee Metz for right subtalar OA. The patient denies new health changes, fever, chills, wheezing, cough, increased SOB, chest pain, open sores or wounds. Last ASA 81mg and Mobic 11/5/20   +DM POC   Hx Atrial Fib S/p cardioversion's x2 S/p pacemaker placement 2016. ECHO EF 55-60% 10/10/2018 (See Care Everywhere). Stress test 06/03/2020 (See Care Everywhere). Follows with cardiologist  Dr Esteban Sánchez and Dr Gloria Allen. Denies increased SOB, palpitations, dizziness, lightheadedness,syncope of chest pain. Vital signs: /74   Pulse 65   Temp 96.9 °F (36.1 °C) (Temporal)   Resp 20   Ht 5' 9\" (1.753 m)   Wt 275 lb (124.7 kg)   SpO2 96%   BMI 40.61 kg/m²     Allergies: Other; Penicillins; Sulfa antibiotics; Tetanus immune globulin; Tetanus toxoids; and Timolol maleate    Medications:    Prior to Admission medications    Medication Sig Start Date End Date Taking?  Authorizing Provider   metoprolol succinate (TOPROL XL) 25 MG extended release tablet Take 1 tablet by mouth daily 8/4/20  Yes Severa Barge, MD   hydroCHLOROthiazide (HYDRODIURIL) 25 MG tablet TAKE 1 TABLET DAILY 6/22/20  Yes Severa Barge, MD   SITagliptin-metFORMIN (JANUMET XR)  MG TB24 per extended release tablet Take 1 tablet by mouth every other day 5/27/20  Yes MILLIE Haines NP   telmisartan (MICARDIS) 40 MG tablet TAKE 1 TABLET DAILY  Patient taking differently: nightly  5/27/20  Yes MILLIE Haines NP   erythromycin daily  Patient taking differently: Take 10 mg by mouth daily as needed prn 4/24/15   Alphonse Deng MD         This is a 77 y. o.morbidly obese male who is pleasant, cooperative, alert and oriented x3, in no acute distress. Heart: Pacemaker present Heart sounds are normal.  HR 65 regular rate and rhythm without murmur, gallop or rub. Lungs: Normal respiratory effort with diminished breath sounds, unlabored w/o use of accessory muscles, wheezes or rales bilaterally   Abdomen: round, obese, soft, nontender, nondistended with bowel sounds . Labs:  Recent Labs     10/27/20  1432   HGB 14.6   HCT 42.8   WBC 6.3   MCV 89.7         K 3.9      CO2 27   BUN 16   CREATININE 1.03   GLUCOSE 172*       Recent Labs     20  Gunzing 9 Not Detected       Tami Donaldson, ANP-BC  Electronically signed 2020 at 11:09 AM    Alphonse Deng MD    Physician    Specialty:  Family Medicine    Progress Notes      Signed    Encounter Date:  10/15/2020          Related encounter: [facility] Visit from 10/15/2020 in [facility]          Signed        Expand All Collapse All     preop clearance - heel and ankle surgery per Dr. Melody Roland     10/15/2020     Jatin Duenas (:  1955) is a 77 y.o. male, here for a preventive medicine evaluation. Patient Active Problem List   Diagnosis    Benign hypertension    Other hyperlipidemia    Depression    Benign prostatic hyperplasia with urinary obstruction    Other urinary incontinence    Morbid obesity with BMI of 40.0-44.9, adult ([address]    Controlled type 2 diabetes mellitus without complication, without long-term current use of insulin (HCC)    Closed fracture of one rib    Presence of cardiac pacemaker    KIM (obstructive sleep apnea)    Class 3 obesity in adult    Viral URI         Review of Systems   Constitutional: Negative for appetite change, fatigue and unexpected weight change.    HENT: Negative for hearing loss. Eyes: Negative for visual disturbance. Respiratory: Negative for cough and shortness of breath. Cardiovascular: Negative for chest pain, palpitations and leg swelling. Gastrointestinal: Negative for abdominal pain, constipation and diarrhea. Genitourinary: Negative for difficulty urinating and frequency. Musculoskeletal: Positive for arthralgias. Negative for myalgias. Skin: Negative for rash. Neurological: Negative for syncope, light-headedness and headaches. Hematological: Negative for adenopathy. Psychiatric/Behavioral: Negative for dysphoric mood. Prior to Visit Medications    Medication Sig Taking? Authorizing Provider   metoprolol succinate (TOPROL XL) 25 MG extended release tablet Take 1 tablet by mouth daily   Jorge Tang MD   hydroCHLOROthiazide (HYDRODIURIL) 25 MG tablet TAKE 1 TABLET DAILY   Jorge Tang MD   meloxicam (MOBIC) 15 MG tablet TAKE 1 TABLET DAILY   Jorge Tang MD   SITagliptin-metFORMIN (JANUMET XR)  MG TB24 per extended release tablet Take 1 tablet by mouth every other day   MILLIE Chowdary - NP   telmisartan (MICARDIS) 40 MG tablet TAKE 1 TABLET DAILY   MILLIE Ford NP   erythromycin (ROMYCIN) 5 MG/GM ophthalmic ointment     Historical Provider, MD   blood glucose test strips (ACCU-CHEK GUIDE) strip CHECK TWO TIMES A DAY.    Jorge Tang MD   pantoprazole (PROTONIX) 40 MG tablet TAKE 1 TABLET DAILY   MILLIE Montiel CNP   atorvastatin (LIPITOR) 80 MG tablet TAKE 1 TABLET DAILY   MILLIE Montiel - CNP   B Complex Vitamins (VITAMIN-B COMPLEX) TABS Take 1 tablet by mouth   Historical Provider, MD   prednisoLONE acetate (PRED FORTE) 1 % ophthalmic suspension Place 1 drop into the right eye nightly    Historical Provider, MD   Multiple Vitamins-Minerals (THERAPEUTIC MULTIVITAMIN-MINERALS) tablet Take 1 tablet by mouth daily   Historical Provider, MD   sertraline (ZOLOFT) transplant x3    CYSTOSCOPY         TURBT 2010    ELBOW SURGERY Right 1967    FOOT SURGERY Right 2016     remove hardware--2 screws   Romelia Brooksim PACEMAKER PLACEMENT   2016     [employer] [facility], [phone], Dr. Ganesh Pham Right 2015    VASECTOMY               Social History               Socioeconomic History    Marital status:        Spouse name: Starla Huggins Number of children: 3    Years of education: Not on file    Highest education level: Not on file   Occupational History    Occupation: Retired    Social Needs    Financial resource strain: Not on file    Food insecurity       Worry: Not on file       Inability: Not on file   [employer] needs       Medical: Not on file       Non-medical: Not on file   Tobacco Use    Smoking status: Former Smoker       Packs/day: 1.50       Years: 20.00       Pack years: 30.00       Types: Cigarettes       Start date:        Last attempt to quit:        Years since quittin.8    Smokeless tobacco: Former User    Tobacco comment: quit    Substance and Sexual Activity    Alcohol use: No       Alcohol/week: 0.0 standard drinks    Drug use: No    Sexual activity: Yes       Partners: Female   Lifestyle    Physical activity       Days per week: Not on file       Minutes per session: Not on file    Stress: Not on file   Relationships    Social connections       Talks on phone: Not on file       Gets together: Not on file       Attends Latter-day service: Not on file       Active member of club or organization: Not on file       Attends meetings of clubs or organizations: Not on file       Relationship status: Not on file    Intimate partner violence       Fear of current or ex partner: Not on file       Emotionally abused: Not on file       Physically abused: Not on file       Forced sexual activity: Not on file   Other Topics Concern    Not on file   Social History Narrative    Not on file Family History         Family History   Problem Relation Age of Onset    Asthma Sister      Heart Disease Other           paternal history    Cancer Mother           melanoma    Kidney Cancer Father      Heart Attack Father      High Blood Pressure Father      Heart Disease Brother      Cancer Sister             ADVANCE DIRECTIVE: N, <no information>     Vitals   There were no vitals filed for this visit. Estimated body mass index is 39.13 kg/m² as calculated from the following:    Height as of 8/24/20: 5' 9\" (1.753 m). Weight as of 8/24/20: 265 lb (120.2 kg). Physical Exam  Vitals signs and nursing note reviewed. Constitutional:       Appearance: He is well-developed. He is obese. HENT:      Head: Normocephalic and atraumatic. Right Ear: External ear normal.      Left Ear: External ear normal.   Eyes:      General:         Right eye: No discharge. Left eye: No discharge. Conjunctiva/sclera: Conjunctivae normal.   Neck:      Musculoskeletal: Neck supple. Thyroid: No thyromegaly. Cardiovascular:      Rate and Rhythm: Normal rate and regular rhythm. Heart sounds: Normal heart sounds. No murmur. No friction rub. No gallop. Pulmonary:      Effort: Pulmonary effort is normal. No respiratory distress. Breath sounds: Normal breath sounds. No wheezing or rales. Lymphadenopathy:      Cervical: No cervical adenopathy. Skin:     General: Skin is warm and dry. Coloration: Skin is not pale. Findings: No erythema. Neurological:      Mental Status: He is alert and oriented to person, place, and time. Psychiatric:         Speech: Speech normal.         Behavior: Behavior normal.         Thought Content: Thought content normal.            No flowsheet data found.            Lab Results   Component Value Date     CHOL 92 05/22/2020     CHOL 88 04/27/2019     CHOL 93 03/07/2018     TRIG 233 05/22/2020     TRIG 165 04/27/2019     TRIG 170 03/07/2018     HDL 31 05/22/2020     HDL 27 04/27/2019     HDL 32 03/07/2018     LDLCALC 14 05/22/2020     LDLCALC 28 04/27/2019     LDLCALC 27 03/07/2018     GLUCOSE 144 02/16/2017     LABA1C 6.5 05/22/2020     LABA1C 5.8 11/12/2019     LABA1C 5.6 08/12/2019         The ASCVD Risk score (Danita Greenberg et al., 2013) failed to calculate for the following reasons: The valid total cholesterol range is 130 to 320 mg/dL          Immunization History   Administered Date(s) Administered    Influenza Vaccine, unspecified formulation 11/21/2016    Influenza Virus Vaccine 10/25/2018    Influenza, Ronna Finner, IM, (6 mo and older Fluzone, Flulaval, Fluarix and 3 yrs and older Afluria) 11/10/2016, 09/07/2017    Influenza, Quadv, IM, PF (6 mo and older Fluzone, Flulaval, Fluarix, and 3 yrs and older Afluria) 09/25/2019    Pneumococcal Conjugate 13-valent ([id number]) 12/11/2018    Pneumococcal Polysaccharide ([id number]) 08/11/2017    Yellow Fever (YF-Vax) 03/19/2013              Health Maintenance   Topic Date Due    AAA screen  1955    Shingles Vaccine (1 of 2) 08/29/2003    Flu vaccine (1) 09/01/2020    Annual Wellness Visit (AWV)  10/13/2020    Diabetic retinal exam  01/08/2021    Diabetic foot exam  05/19/2021    A1C test (Diabetic or Prediabetic)  05/22/2021    Diabetic microalbuminuria test  05/22/2021    Lipid screen  05/22/2021    Potassium monitoring  05/22/2021    Creatinine monitoring  05/22/2021    Pneumococcal 65+ years Vaccine (2 of 2 - PPSV23) 08/11/2022    Colon cancer screen colonoscopy  08/01/2027    Hepatitis C screen  Completed    Hepatitis A vaccine  Aged Out    Hib vaccine  Aged Out    Meningococcal (ACWY) vaccine  Aged Out         ASSESSMENT/PLAN:    Diagnosis Orders   1. Controlled type 2 diabetes mellitus without complication, without long-term current use of insulin (HCC)  POCT glycosylated hemoglobin (Hb A1C)   2. Preop general physical exam               No follow-ups on file.      An electronic signature was used to authenticate this note. --Adwoa Obrien MD on 10/15/2020 at 8:14 AM                     I spoke to the patient in the preoperative area, and the operative site was identified, verified and marked. The procedure was verified. We have reviewed the risks, benefits, and alternatives to the procedure. No guarantees were made. I have also reviewed the history and physical. There are no significant changes in medical history. All questions were answered and they wish to proceed as planned.      Electronically signed by Jose Begum MD

## 2020-11-13 ENCOUNTER — CLINICAL DOCUMENTATION (OUTPATIENT)
Dept: ORTHOPEDIC SURGERY | Age: 65
End: 2020-11-13

## 2020-11-13 NOTE — ANESTHESIA POSTPROCEDURE EVALUATION
Department of Anesthesiology  Postprocedure Note    Patient: Radhames Parra  MRN: [id number]  YOB: 1955  Date of evaluation: 11/12/2020  Time:  7:07 PM     Procedure Summary     Date:  11/12/20 Room / Location:  [address] [place] [address] - INPATIENT    Anesthesia Start:  1340 Anesthesia Stop:  1619    Procedure:  Right foot subtalar fusion,Right percutaneous tendoachilles lengthening, performed through separate incision (Right ) Diagnosis:  (DX SUBTALAR OA RIGHT)    Surgeon:  Mariela Travis MD Responsible Provider:  Trina Xie DO    Anesthesia Type:  general ASA Status:  3          Anesthesia Type: general    Nelson Phase I:      Nelson Phase II:      Last vitals: Reviewed and per EMR flowsheets.        Anesthesia Post Evaluation    Patient location during evaluation: PACU  Patient participation: complete - patient participated  Level of consciousness: awake  Airway patency: patent  Nausea & Vomiting: no nausea  Complications: no  Cardiovascular status: blood pressure returned to baseline  Respiratory status: acceptable  Hydration status: euvolemic

## 2020-11-25 ENCOUNTER — OFFICE VISIT (OUTPATIENT)
Dept: ORTHOPEDIC SURGERY | Age: 65
End: 2020-11-25

## 2020-11-25 VITALS
TEMPERATURE: 97 F | WEIGHT: 275 LBS | HEIGHT: 69 IN | BODY MASS INDEX: 40.73 KG/M2 | RESPIRATION RATE: 18 BRPM | HEART RATE: 94 BPM

## 2020-11-25 PROCEDURE — 99024 POSTOP FOLLOW-UP VISIT: CPT | Performed by: ORTHOPAEDIC SURGERY

## 2020-11-25 NOTE — PROGRESS NOTES
[facility]  [address] [address]  Dept: [phone]    Ambulatory Orthopedic Postoperative Visit     Preoperative Diagnosis:   1. 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 Right ankle equinus contracture  3. CAD with h/o Afib s/p ablation + pacemaker  4. Diabetes  5. H/o tobacco use  6. Body mass index is 40.61 kg/m².     Postoperative Diagnosis:   1. Same as above     Procedures Performed:  (11/12/2020)  1. Right foot subtalar fusion   2. Right percutaneous tendoachilles lengthening, performed through separate incision     SUBJECTIVE:     The patient returns post op from the above stated procedure. Reports doing well overall, reports improved pain, denies wound drainage/issues, fevers/chills/night sweats, calf swelling/pain, chest pain, shortness of breath. OBJECTIVE:  Pulse 94   Temp 97 °F (36.1 °C)   Resp 18   Ht 5' 9\" (1.753 m)   Wt 275 lb (124.7 kg)   BMI 40.61 kg/m²    NAD, resting comfortably  Incisions clean/dry/intact, no erythema/dehiscence/drainage  Sensation to light touch grossly intact throughout   Warm and well perfused  Grossly neurovascularly intact distally  No signs of infection  Slight maceration of incision      RADIOLOGY:   11/25/2020 No new radiology images today. Prior images reviewed for reference. ASSESSMENT AND PLAN:     2 weeks s/p above, doing well overall        [x]  Sutures were removed and steri-strips applied          Precautions:  nonweight bearing x 8 weeks anticipated on the Right lower extremity             -             []?  Physical Therapy/Home exercises        Immobilization:      [x]? Splint/Cast               []?  CAM boot                    []?  Comfortable shoe    []? Other:                DVT ppx:   [x]? Early mobilization              [x]?   Medication as prescribed (Aspirin 325 mg PO q Day)                     []?  No chemical ppx needed; mechanical only             -     Pain control:  Medication as prescribed (dosing and quantity) indicated for acute postoperative pain control             -     Special concerns:       []? Vitamin D -- due to relatively low level, the patient will take supplementation                           []?  Prealbumin -- due to relatively low level, the patient will take supplementation    [x]? Glucose control            [x]? Tobacco cessation     []? Pin pulling in office at 6 weeks anticipated    []? Follow up on pathology results    []? Follow up on culture results    [x]? Bone stimulator postoperatively          [x]? Avoid strenuous activity/pain provoking maneuvers and high-impact repetitive exercises     -The patient was also counseled to avoid routine use of oral NSAIDs for at least 6 months from the date of surgery due to the risk of delayed healing. He was also provided information on dental prophylaxis. All questions were answered and the patient agrees with the above plan. The patient will return to clinic in 4 weeks with right foot and calcaneus x-rays out of plaster, simulated weightbearing. At his next visit, I anticipate placing him into a cam boot, and will begin to use his bone stimulator           Return in about 4 weeks (around 12/23/2020). No orders of the defined types were placed in this encounter. No orders of the defined types were placed in this encounter. Aldo Roland MD  Orthopedic Surgery        Please excuse any typos/errors, as this note was created with the assistance of voice recognition software. While intending to generate a document that actually reflects the content of the visit, the document can still have some errors including those of syntax and sound-a-like substitutions which may escape proof reading. In such instances, actual meaning can be extrapolated by context.

## 2020-12-23 ENCOUNTER — OFFICE VISIT (OUTPATIENT)
Dept: ORTHOPEDIC SURGERY | Age: 65
End: 2020-12-23

## 2020-12-23 VITALS — HEIGHT: 69 IN | RESPIRATION RATE: 12 BRPM | BODY MASS INDEX: 40.73 KG/M2 | WEIGHT: 275 LBS | TEMPERATURE: 97.9 F

## 2020-12-23 PROCEDURE — 99024 POSTOP FOLLOW-UP VISIT: CPT | Performed by: ORTHOPAEDIC SURGERY

## 2020-12-23 RX ORDER — ASPIRIN 325 MG
325 TABLET, DELAYED RELEASE (ENTERIC COATED) ORAL DAILY
Qty: 42 TABLET | Refills: 0 | Status: SHIPPED | OUTPATIENT
Start: 2020-12-23 | End: 2021-02-15

## 2020-12-23 NOTE — PROGRESS NOTES
[facility]  [address] [address]  Dept: [phone]    Ambulatory Orthopedic Postoperative Visit     Preoperative Diagnosis:   1. 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 Right ankle equinus contracture  3. CAD with h/o Afib s/p ablation + pacemaker  4. Diabetes  5. H/o tobacco use  6. Body mass index is 40.61 kg/m².     Postoperative Diagnosis:   1. Same as above     Procedures Performed:  (11/12/2020)  1. Right foot subtalar fusion   2. Right percutaneous tendoachilles lengthening, performed through separate incision       SUBJECTIVE:     The patient returns post op from the above stated procedure. Reports doing well overall, reports improved pain, denies wound drainage/issues, fevers/chills/night sweats, calf swelling/pain, chest pain, shortness of breath. OBJECTIVE:  Temp 97.9 °F (36.6 °C)   Resp 12   Ht 5' 9\" (1.753 m)   Wt 275 lb (124.7 kg)   BMI 40.61 kg/m²    NAD, resting comfortably  Incisions clean/dry/intact, no erythema/dehiscence/drainage  Sensation to light touch grossly intact throughout   Warm and well perfused  Grossly neurovascularly intact distally  No signs of infection      RADIOLOGY:   12/23/2020 FINDINGS:  Three weightbearing views (AP, Oblique, Lateral) of the right foot and two weightbearing views (Axial and Lateral) of the right calcaneus were obtained in the office today and reviewed, revealing no acute fracture, dislocation, or radioopaque foreign body/tumor. Intact hardware status post subtalar fusion without evidence of loosening. No interval displacement. IMPRESSION: Status post subtalar fusion as above.     Electronically signed by Angelic Robles MD    ASSESSMENT AND PLAN: Please excuse any typos/errors, as this note was created with the assistance of voice recognition software. While intending to generate a document that actually reflects the content of the visit, the document can still have some errors including those of syntax and sound-a-like substitutions which may escape proof reading. In such instances, actual meaning can be extrapolated by context.

## 2021-02-05 ENCOUNTER — OFFICE VISIT (OUTPATIENT)
Dept: ORTHOPEDIC SURGERY | Age: 66
End: 2021-02-05
Payer: COMMERCIAL

## 2021-02-05 VITALS — WEIGHT: 275 LBS | BODY MASS INDEX: 40.73 KG/M2 | HEIGHT: 69 IN | RESPIRATION RATE: 12 BRPM | TEMPERATURE: 97.1 F

## 2021-02-05 DIAGNOSIS — M19.079 ARTHRITIS OF SUBTALAR JOINT: Primary | ICD-10-CM

## 2021-02-05 DIAGNOSIS — M19.271 SECONDARY LOCALIZED OSTEOARTHROSIS OF RIGHT ANKLE AND FOOT: Primary | ICD-10-CM

## 2021-02-05 PROCEDURE — 99024 POSTOP FOLLOW-UP VISIT: CPT | Performed by: ORTHOPAEDIC SURGERY

## 2021-02-05 NOTE — PROGRESS NOTES
[facility]  [address] [address]  Dept: [phone]    Ambulatory Orthopedic Postoperative Visit     Preoperative Diagnosis:   1. 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 Right ankle equinus contracture  3. CAD with h/o Afib s/p ablation + pacemaker  4. Diabetes  5. H/o tobacco use  6. Body mass index is 40.61 kg/m².     Postoperative Diagnosis:   1. Same as above     Procedures Performed:  (11/12/2020)  1. Right foot subtalar fusion   2. Right percutaneous tendoachilles lengthening, performed through separate incision       SUBJECTIVE:     The patient returns post op from the above stated procedure. Reports doing well overall, reports improved pain, denies wound drainage/issues, fevers/chills/night sweats, calf swelling/pain, chest pain, shortness of breath. OBJECTIVE:  Temp 97.1 °F (36.2 °C)   Resp 12   Ht 5' 9\" (1.753 m)   Wt 275 lb (124.7 kg)   BMI 40.61 kg/m²    NAD, resting comfortably  Incisions clean/dry/intact, no erythema/dehiscence/drainage  Sensation to light touch grossly intact throughout   Warm and well perfused  Grossly neurovascularly intact distally  No signs of infection  -No gross motion of the subtalar joint  -No tenderness over sinus Tarsi or hardware      RADIOLOGY:   2/5/2021 FINDINGS:  Three weightbearing views (AP, Oblique, Lateral) of the right foot were obtained in the office today and reviewed, revealing no acute fracture, dislocation, or radioopaque foreign body/tumor. Intact hardware status post subtalar fusion without evidence of loosening. No interval displacement. IMPRESSION: Status post subtalar fusion as above. Electronically signed by Mark Kurtz MD    ASSESSMENT AND PLAN:     12 weeks s/p above, doing well overall.      Precautions:  nonweight bearing x 12 weeks anticipated on the Right lower extremity             -We will begin to advance the patient's weightbearing status, and the importance of strictly adhering to the recommendations was highlighted. We discussed beginning weightbearing at 50% partial weightbearing for 2 weeks, progressing to 75% partial weightbearing for 2 weeks, then progressing to weightbearing as tolerated for 2 weeks. Weightbearing will only be performed while protected in the CAM boot. An educational handout was also provided explaining/reinforcing these details. [x]? Physical Therapy/Home exercises         Immobilization:      []? Splint/Cast               [x]? CAM boot                    [x]? Comfortable shoe    []? Other:                DVT ppx:   [x]? Early mobilization              []?  Medication as prescribed (Aspirin 325 mg PO q Day)                 [x]? No chemical ppx needed; mechanical only             -     Special concerns:       [x]? Glucose control            [x]? Tobacco cessation --he reports he has not smoked since surgery    [x]? Bone stimulator --he will begin daily use starting today 12/23/2020          [x]? Avoid strenuous activity/pain provoking maneuvers and high-impact repetitive exercises     -The patient was again counseled to avoid routine use of oral NSAIDs for at least 6 months from the date of surgery due to the risk of delayed healing. He was also provided information on dental prophylaxis. All questions were answered and the patient agrees with the above plan. The patient will return to clinic in 8 weeks with right foot x-rays, semiweightbearing             Return in about 8 weeks (around 4/2/2021). No orders of the defined types were placed in this encounter. No orders of the defined types were placed in this encounter. Priti Crooks MD  Orthopedic Surgery        Please excuse any typos/errors, as this note was created with the assistance of voice recognition software.  While intending to generate a document that actually reflects the content of the visit, the document can still have some errors including those of syntax and sound-a-like substitutions which may escape proof reading. In such instances, actual meaning can be extrapolated by context.

## 2021-02-08 ENCOUNTER — TELEPHONE (OUTPATIENT)
Dept: ORTHOPEDIC SURGERY | Age: 66
End: 2021-02-08

## 2021-02-08 NOTE — TELEPHONE ENCOUNTER
Fuad Saldana from [facility] calling to see if you put in a C-9 for approval for physical therapy for the patient. Patient has been scheduled for 2/11, but if the C-9 hasn't been submitted, then they need to cancel the evaluation. Please call Fuad Saldana back.

## 2021-02-08 NOTE — TELEPHONE ENCOUNTER
Spoke with Brooke Ivey was sent Friday. She requested C9 be faxed over so they can start treatment under presumptive care. I explained C9 is on my desk in [place] and I will submit tomorrow.

## 2021-02-11 ENCOUNTER — HOSPITAL ENCOUNTER (OUTPATIENT)
Dept: PHYSICAL THERAPY | Age: 66
Setting detail: THERAPIES SERIES
Discharge: HOME | End: 2021-02-11
Payer: COMMERCIAL

## 2021-02-11 PROCEDURE — 97161 PT EVAL LOW COMPLEX 20 MIN: CPT

## 2021-02-11 PROCEDURE — 97116 GAIT TRAINING THERAPY: CPT

## 2021-02-11 PROCEDURE — 97110 THERAPEUTIC EXERCISES: CPT

## 2021-02-11 NOTE — FLOWSHEET NOTE
[facility] Fall Risk Assessment    Patient Name:  Faith Goff  : 1955        Risk Factor Scale  Score   History of Falls [] Yes  [x] No 25  0 0   Secondary Diagnosis [] Yes  [x] No 15  0 0   Ambulatory Aid [] Furniture  [x] Crutches/cane/walker  [] None/bedrest/wheelchair/nurse 30  15  0 15   IV/Heparin Lock [] Yes  [x] No 20  0 0   Gait/Transferring [] Impaired  [] Weak  [x] Normal/bedrest/immobile 20  10  0 0   Mental Status [] Forgets limitations  [x] Oriented to own ability 15  0 0      Total: 15     Based on the Assessment score: check the appropriate box.     [x]  No intervention needed   Low =   Score of 0-24    []  Use standard prevention interventions Moderate =  Score of 24-44   [] Give patient handout and discuss fall prevention strategies   [] Establish goal of education for patient/family RE: fall prevention strategies    []  Use high risk prevention interventions High = Score of 45 and higher   [] Give patient handout and discuss fall prevention strategies   [] Establish goal of education for patient/family Re: fall prevention strategies   [] Discuss lifeline / other resources    Electronically signed by:   Dex Eli PT  Date: 2021

## 2021-02-11 NOTE — CONSULTS
[address] Outpatient Physical Therapy              [address] [facility] [id number]              Phone: [phone]              Fax: [phone]    Physical Therapy Lower Extremity Evaluation    Date:  2021  Patient: Glenn Carpenter  : 1955  MRN: [id number]  Physician: Dr. 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 : 2021    Subjective:   CC: Orlando Frank is a 77year old male presenting with R foot and ankle pain. HPI: Pt reports R foot and ankle pain started in  following an industrial accident that crushed his calcaneus. Pt reports increasing pain and difficulty up his right foot and ankle over the past few years. On 2020, pt underwent R foot subtalar fusion and R percutaneous tendoachilles lengthening. Pt arrives to today's treatment session ambulating with axillary crutches and CAM walking boot. Pt reports that Dr. Madeleine Smith progressed him from NWB to 50% [address] [facility] last Friday. Pt notes that since his weight bearing progression he has been using his crutches mainly, prior he was using his knee scooter and walker as well. Prior to surgery, pt intermittently used a SPC for community ambulation or during times of increased pain. Additionally, pt reports that he uses a bone simulator once a day. Pt reports that his wife is currently doing heavier chores around the house due to his weight bearing restrictions. Pt reports that he has not gone down the steps to his basement but he wishes to get back to performing stairs so that he can get down to his man cave.        PMHx: [] Unremarkable [x] Diabetes [x] HTN  [x] Pacemaker   [x] MI/Heart Problems [] Cancer [x] Arthritis   [x] Other: HLD, anxiety              [x] Refer to full medical chart  In EPIC     Tests: [x] X-Ray:    [] MRI:    [x] Other: CT    Comorbidities:   [x] Obesity [] Dialysis  [] N/A   [] Asthma/COPD [] Dementia [] Other:   [] Stroke [] Sleep apnea [] Other:   [] Vascular disease [] Rheumatic disease [] Other:       Medications:  [x] Refer to full medical record   Allergies:       [x] Refer to full medical record       Martial Status    Home type 1 Story with basement   Stairs from outside 2 CHERY, pt has ramp with railings to get inside   Stairs inside N/A   Employement Retired   Job status --   Work Activities/duties  --   Recreational Activities Working out, would like to get down to his man cave        Pain present? Yes   Location Ball of foot, calcaneus   Pain Rating currently 2-3/10   Pain at worse 7-8/10   Pain at best 1-2/10   Description of pain Constant, dull   Altered Sensation Pinky toe tingling that patient attributes to the incisions   What makes it worse Walking   What makes it better Elevating, sitting, wrapping   Symptom progression Improving   Sleep No trouble             Objective:    ROM  ° A/P STRENGTH    Left Right Left Right   Hip Flex   4+ 4+   Ext       ER       IR       ABD    4-   ADD       Knee Flex       Ext       Ankle PF 28 15 5 --   DF  (knee straight) 15 2 5 --   DF   (knee flexed) 20 5 5 --   Inv 42 5 5 --   Ever 10 2 5 --   1st MTP DF       1st MTP PF              **Deferred R ankle MMT due to [address] status      TESTS (+/-) Left Right Not Tested   Ant.  Drawer   []   Post. Drawer   []   Varus stress    []   Valgus Stress    []   Gastroc Equinus  + []   Talor Tilt   []      []     Foot/Ankle Mobility  Left  Right    Talocrural Jt      Subtalar Jt  fused    1st MTP Jt       Forefoot      Midfoot           OBSERVATION No Deficit Deficit Comments   Posture   Pt holds R LE in slight ER compared to the L in both standing and supine  Pt with ACE bandage wrapped around R foot   Genu Valgus [] []    Genu Varus [] []    Genu Recurvatum [] []    Cavo Varus Foot [] []    Neutral Foot [] []    [place] Valgus Foot [] [x]    Gastroc Equinus [] []    Hallux Valgus [] [x]    Pronation [] []    Supination [] []    Leg Length Discrp [] []     [] []    Palpation [] [x] Tender to palpation along lateral incisions with mild edema noted around bilateral malleoli  Incisions dry and healing well   Sensation [] [x] Numbness to R 5th digit   Edema [] [x] Figure 8 = 64 cm   Neurological [x] []    Patellar Mobility [x] []    Patellar Orientation [x] []    Gait [] [x] Analysis: pt currently ambulating with axillary crutches and maintaining < 50% WB on R LE         FUNCTION Normal Difficult Unable   Sitting [x] [] []   Standing [] [x] []   Ambulation [] [x] []   Groom/Dress [] [x] []   Lift/Carry [] [] [x]   Stairs [] [] [x]   Bending [] [x] []   Squat [] [] [x]   Kneel [] [x] []         BALANCE/PROPRIOCEPTION              [x] Not tested   Single leg stance       R                     L                                PAIN   Eyes open                             Sec. Sec                  . []    Eyes closed                          Sec. Sec                  . []          FUNCTIONAL TESTS PAIN NO PAIN COMMENTS   Step Test 4 [] []    6 [] []    8 [] []    Squat [] []    **Deferred functional tests at this date due to current WBing restrictions       Flexibility Normal Left tight Right tight   Hip flexor [] [] []   quad [] [] []   HS [] [] [x]   piriformis [] [] []   ITB [] [] []   gastroc [] [] [x]   Soleus  [] [] [x]    [] [] []    [] [] []        Functional Test: Foot and Ankle Ability Measure (FAAM) Score: 73% functionally impaired       Comments:      Assessment:    Pt presents with R foot and ankle pain with signs and symptoms consistent following R subtalar fusion on 11/12/2020. Pt recently transitioned to 50% WB in CAM walking boot last Friday.  Pt reports that since weightbearing progression he has been mainly ambulating with axillary crutches, able to maintain WBing precautions with good carry over with use of analog scale during today's treatment session practicing weight shifts in all directions, with gait and transfers. Pt demonstrating impairments in R ankle strength and ROM, limiting his ability to complete ADL's. Patient would benefit from skilled physical therapy services in order to: improve ROM and strength, normalize gait pattern and decrease pain in order to return to performing independent ADL's. Problems:    [x] ? Pain: R ankle/foot pain, 2-3/10  [x] ? ROM: impaired R ankle motion in all directions  [x] ? Strength: global R LE strength  [x] ? Function: FAAM = 73% functional impairment  [x] Other: gait impairments          STG: (to be met in 8 treatments)  1. ? Pain: Decrease pain levels to < 2/10 with progression of strengthening and WBing exercises throughout therapy. 2. ? ROM: Pt will demonstrate full R ankle ROM per fusion protocol in order to improve gait mechanics and tolerance to prolonged standing and walking. 3. ? Strength: Pt will demonstrate increased R hip strength to 4+/5 globally in order to improve gait mechanics. 4. ? Function: Pt will demonstrate improved functional activity tolerance as evident by an improved score on the FAAM to <50% functional impairment. 5. Independent with Home Exercise Programs    LTG: (to be met in 16 treatments)  1. Reduce pain levels to no greater than 4-5/10 with prolonged standing and walking while performing ADL's once performing WBAT. 2. Pt will demonstrate ability to ambulate x200 ft with least restrictive assistive device with minimal gait deficits once WBAT and weaned into comfortable sneaker. 3. Pt will increase R ankle strength to 4+/5 globally in order to improve tolerance to performing stairs in order to get down to his basement. 4. Pt will demonstrate improved functional activity tolerance as evident by an improved score on the FAAM to <30% functional impairment.                    Patient goals: intrinsic foot exercises (toe yoga, arch lift), gentle PROM, active ankle ROM exercises, progress mat table hip strengthening exercises, continue to monitor WBing precautions while ambulating if needed    Evaluation Complexity:  History (Personal factors, comorbidities) [] 0 [] 1-2 [x] 3+   Exam (limitations, restrictions) [] 1-2 [x] 3 [] 4+   Clinical presentation (progression) [x] Stable [] Evolving  [] Unstable   Decision Making [x] Low [] Moderate [] High    [x] Low Complexity [] Moderate Complexity [] High Complexity       Treatment Charges: Mins Units Time In-Time out   [x] Evaluation       [x]  Low       []  Moderate       []  High 25 1 2:45-3:10 pm   []  Modalities      [x]  Ther Exercise 14 1 3:10-3:24 pm    []  Manual Therapy      []  Ther Activities      []  Aquatics      []  Vasocompression      [x]  Other: gait 8 1 3:25-3:33 pm     TOTAL TREATMENT TIME: 47    Time in: 2:45 pm   Time Out: 3:37 pm    Electronically signed by: Silas Closs, PT        Physician Signature:________________________________Date:__________________  By signing above or cosigning this note, I have reviewed this plan of care and certify a need for medically necessary rehabilitation services.      *PLEASE SIGN ABOVE AND FAX BACK ALL PAGES*

## 2021-02-14 PROBLEM — Z80.0 FAMILY HISTORY OF MALIGNANT NEOPLASM OF GASTROINTESTINAL TRACT: Status: ACTIVE | Noted: 2021-02-14

## 2021-02-14 PROBLEM — K21.9 GASTROESOPHAGEAL REFLUX DISEASE: Status: ACTIVE | Noted: 2021-02-14

## 2021-02-14 PROBLEM — R07.89 OTHER CHEST PAIN: Status: ACTIVE | Noted: 2020-05-28

## 2021-02-16 ENCOUNTER — HOSPITAL ENCOUNTER (OUTPATIENT)
Dept: PHYSICAL THERAPY | Age: 66
Setting detail: THERAPIES SERIES
End: 2021-02-16
Payer: COMMERCIAL

## 2021-02-19 ENCOUNTER — HOSPITAL ENCOUNTER (OUTPATIENT)
Dept: PHYSICAL THERAPY | Age: 66
Setting detail: THERAPIES SERIES
Discharge: HOME | End: 2021-02-19
Payer: COMMERCIAL

## 2021-02-19 PROCEDURE — 97110 THERAPEUTIC EXERCISES: CPT

## 2021-02-19 PROCEDURE — 97016 VASOPNEUMATIC DEVICE THERAPY: CPT

## 2021-02-19 PROCEDURE — 97116 GAIT TRAINING THERAPY: CPT

## 2021-02-19 NOTE — FLOWSHEET NOTE
Other:    [x] Patient would continue to benefit from skilled physical therapy services in order to: improve ROM and strength, normalize gait pattern and decrease pain in order to return to performing independent ADL's.     2/19/2021: Gait: pt amb into clinic with [facility] with poor gait technique with cane with CAM boot. Frequent VC needed for proper gait with LBQC, posture and ensure patient was maintaining 75% WB on RLE. Pt ed on proper gait with [facility] and cane was adjusted this session for proper use. Seated rest break needed during standing 3 way hip d/t fatigue. Added supine SLR 10x2 to inc hip strength. Pt performs wt shifts onto scale with 50%-75% WB without difficulty. Pt attempted to perform toe yoga but had difficulty performing this session with pt stating \"all of my toes come up at the same time and I'm wearing a compression sock. \". Continue with strengthening and ROM activities of R foot and ankle and inc strength and stability overall for safe amb. STG: (to be met in 8 treatments)  1. ? Pain: Decrease pain levels to < 2/10 with progression of strengthening and WBing exercises throughout therapy. 2. ? ROM: Pt will demonstrate full R ankle ROM per fusion protocol in order to improve gait mechanics and tolerance to prolonged standing and walking. 3. ? Strength: Pt will demonstrate increased R hip strength to 4+/5 globally in order to improve gait mechanics. 4. ? Function: Pt will demonstrate improved functional activity tolerance as evident by an improved score on the FAAM to <50% functional impairment. 5. Independent with Home Exercise Programs     LTG: (to be met in 16 treatments)  1. Reduce pain levels to no greater than 4-5/10 with prolonged standing and walking while performing ADL's once performing WBAT. 2. Pt will demonstrate ability to ambulate x200 ft with least restrictive assistive device with minimal gait deficits once WBAT and weaned into comfortable sneaker.   3. Pt will increase R ankle strength to 4+/5 globally in order to improve tolerance to performing stairs in order to get down to his basement. 4. Pt will demonstrate improved functional activity tolerance as evident by an improved score on the FAAM to <30% functional impairment.                    Patient goals: \"get boot off, no crutches\"    Pt. Education:  [x] Yes  [] No  [x] Reviewed Prior HEP/Ed  Method of Education: [x] Verbal  [] Demo  [] Written  Comprehension of Education:  [x] Verbalizes understanding. [] Demonstrates understanding. [] Needs review. [] Demonstrates/verbalizes HEP/Ed previously given. Plan: [x] Continue per plan of care.    [] Other:      Treatment Charges: Mins Units TIME   []  Modalities      [x]  Ther Exercise 15 1 4613-9357   []  Manual Therapy      []  Ther Activities      []  Aquatics      []  Neuromuscular      [x] Vasocompression 15 1 6083-4383   [x] Gait Training 15 1 2371-3958   [] Dry needling        [] 1 or 2 muscles        [] 3 or more muscles      []  Other      Total Treatment time 45 3      Time In: 1430           Time Out: 1309    Electronically signed by:  Mary Ridley PTA

## 2021-02-23 ENCOUNTER — HOSPITAL ENCOUNTER (OUTPATIENT)
Dept: PHYSICAL THERAPY | Age: 66
Setting detail: THERAPIES SERIES
Discharge: HOME | End: 2021-02-23
Payer: COMMERCIAL

## 2021-02-23 ENCOUNTER — HOSPITAL ENCOUNTER (OUTPATIENT)
Age: 66
Setting detail: SPECIMEN
Discharge: HOME | End: 2021-02-23
Payer: MEDICARE

## 2021-02-23 DIAGNOSIS — E11.9 CONTROLLED TYPE 2 DIABETES MELLITUS WITHOUT COMPLICATION, WITHOUT LONG-TERM CURRENT USE OF INSULIN (HCC): ICD-10-CM

## 2021-02-23 PROCEDURE — 97116 GAIT TRAINING THERAPY: CPT

## 2021-02-23 PROCEDURE — 97110 THERAPEUTIC EXERCISES: CPT

## 2021-02-23 PROCEDURE — 97112 NEUROMUSCULAR REEDUCATION: CPT

## 2021-02-23 NOTE — FLOWSHEET NOTE
[facility]   [facility]   Phone: [phone]   Fax: [phone]    Physical Therapy Daily Treatment Note      Date:  2021  Patient Name:  Dalene Mortimer    :  1955  MRN: [id number]  Physician: Dr. 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 Lexis Bernardo: 2021  Visit# / total visits: 3/16 Cancels/No Shows: 0/0    Subjective:  Pt states he is tolerating walking and standing better. Pt reports he was able to stand and cook yesterday and sat for breaks as needed. Pt states he is compliant with HEP. Pain:  [] Yes  [x] No Location:  Pain Rating: (0-10 scale)denies/10  Pain altered Tx:  [x] No  [] Yes  Action:   Comments:         Objective: 2021 Pt amb with LBQC this session and CAM boot. VC needed for proper use of cane to inc support and improve balance while maintaining PWB on RLE. Modalities: Vaso held this session d/t pt having an appt to get labs done.  2021  Precautions: (in CAM boot when ambulating) 50% Partial Wb'ing x 2 weeks (-), then 75% Partial Wb'ing x 2 weeks (-3/5), then WBAT if pt is comfortable.     Please evaluate and treat, services as deemed appropriate by the therapist, including:  Gait training, range of motion, light strengthening as tolerated   Exercises:  Exercises:  Exercise       Reps/ Time Weight/ Level Comments Completed 2021     Sidelying hip abd RLE only 10x2    Inc to 2 sets this session x   Supine DF + Inv 5x45\"   strap x   Active ankle pumps 2x10     x    Supine HS stretch 3x 30\"    strap x   Weight shifts (AP, ML) 10x ea Use of analog scale (75% WB)  **Inc to 75% WB per doctor x   Transfers from sit<>stand 10x2 Use of analog scale (75% WB)  **Inc to 75% WB per in 16 treatments)  1. Reduce pain levels to no greater than 4-5/10 with prolonged standing and walking while performing ADL's once performing WBAT. 2. Pt will demonstrate ability to ambulate x200 ft with least restrictive assistive device with minimal gait deficits once WBAT and weaned into comfortable sneaker. 3. Pt will increase R ankle strength to 4+/5 globally in order to improve tolerance to performing stairs in order to get down to his basement. 4. Pt will demonstrate improved functional activity tolerance as evident by an improved score on the FAAM to <30% functional impairment.                    Patient goals: \"get boot off, no crutches\"    Pt. Education:  [x] Yes  [] No  [x] Reviewed Prior HEP/Ed  Method of Education: [x] Verbal  [] Demo  [] Written  Comprehension of Education:  [x] Verbalizes understanding. [] Demonstrates understanding. [] Needs review. [] Demonstrates/verbalizes HEP/Ed previously given. Plan: [x] Continue per plan of care.    [] Other:      Treatment Charges: Mins Units TIME   []  Modalities      [x]  Ther Exercise 10 1 7659-8297   []  Manual Therapy      []  Ther Activities      []  Aquatics      [x]  Neuromuscular 10 1 4963-8780   [] Vasocompression      [x] Gait Training 25 1 1882-2212   [] Dry needling        [] 1 or 2 muscles        [] 3 or more muscles      []  Other      Total Treatment time 45 3      Time In: 1000          Time Out: 5413    Electronically signed by:  Shantal Leo PTA

## 2021-02-24 LAB
ESTIMATED AVERAGE GLUCOSE: 163 MG/DL
HBA1C MFR BLD: 7.3 % (ref 4–6)

## 2021-02-25 ENCOUNTER — HOSPITAL ENCOUNTER (OUTPATIENT)
Dept: PHYSICAL THERAPY | Age: 66
Setting detail: THERAPIES SERIES
Discharge: HOME | End: 2021-02-25
Payer: COMMERCIAL

## 2021-02-25 PROCEDURE — 97110 THERAPEUTIC EXERCISES: CPT

## 2021-02-25 PROCEDURE — 97116 GAIT TRAINING THERAPY: CPT

## 2021-02-25 NOTE — FLOWSHEET NOTE
[facility]   [address]   Phone: [phone]   Fax: [phone]    Physical Therapy Daily Treatment Note      Date:  2021  Patient Name:  Comfort Hutchinson    :  1955  MRN: [id number]  Physician: Dr. 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 Piña Leiva: 2021  Visit# / total visits: 3/16 Cancels/No Shows: 0/0    Subjective:    Pain:  [] Yes  [x] No Location:  Pain Rating: (0-10 scale)0/10  Pain altered Tx:  [x] No  [] Yes  Action:   Comments: Pt arrives with no pain in R ankle/foot, denies any changes. Objective:   Modalities: Vaso held this session d/t pt having an appt to get labs done.  2021  Precautions: (in CAM boot when ambulating) 50% Partial Wb'ing x 2 weeks (-), then 75% Partial Wb'ing x 2 weeks (-3/5), then WBAT if pt is comfortable.     Please evaluate and treat, services as deemed appropriate by the therapist, including:  Gait training, range of motion, light strengthening as tolerated   Exercises:  Exercises:  Exercise       Reps/ Time Weight/ Level Comments Completed 2021     Sidelying hip abd RLE only 10x2    Inc to 2 sets this session x   Supine DF + Inv 5x45\"   strap x   Active ankle pumps 2x10     x    Supine HS stretch 3x 30\"    strap x   Weight shifts (AP, ML) 10x ea Use of analog scale (75% WB)  **Inc to 75% WB per doctor x   Transfers from sit<>stand 10x2 Use of analog scale (75% WB)  **Inc to 75% WB per doctor x    Standing 3 way hip  15x    WB on LLE and AROM on RLE seated rest break needed d/t fatigue. x   SLR  10x2     x    Ascending/descending stairs 1x full flight of stairs  ascending with hand rail on R side and descending with rail on L side.  With LBQC and one UE support to mimic stairs at home x    Seated BAPS  2x10  L2  A/P x                                                          Other:      Specific Instructions for next treatment: Restore R ankle AROM and progress hip strengthening per protocol       Assessment: [x] Progressing toward goals. Pt arrives ambulating with LBQC and CAM boot, maintaining WB restriction. Continued with exercises per chart adding seated baps and increased repetitions of 3 way hip. Several rest breaks needed during exercises due to fatigue otherwise good tolerance. Assessed stair ambulation this session, minimal cueing needed to reduce speed during stair descent, pt maintained WB restrictions and ambulated stairs safely. Pt declines vaso at end of session and plans to ice at home. [] No change. [] Other:    [x] Patient would continue to benefit from skilled physical therapy services in order to: improve ROM and strength, normalize gait pattern and decrease pain in order to return to performing independent ADL's. STG: (to be met in 8 treatments)  1. ? Pain: Decrease pain levels to < 2/10 with progression of strengthening and WBing exercises throughout therapy. 2. ? ROM: Pt will demonstrate full R ankle ROM per fusion protocol in order to improve gait mechanics and tolerance to prolonged standing and walking. 3. ? Strength: Pt will demonstrate increased R hip strength to 4+/5 globally in order to improve gait mechanics. 4. ? Function: Pt will demonstrate improved functional activity tolerance as evident by an improved score on the FAAM to <50% functional impairment. 5. Independent with Home Exercise Programs     LTG: (to be met in 16 treatments)  1. Reduce pain levels to no greater than 4-5/10 with prolonged standing and walking while performing ADL's once performing WBAT.   2. Pt will demonstrate ability to ambulate x200 ft with least restrictive assistive device with minimal gait deficits once WBAT and weaned into comfortable sneaker. 3. Pt will increase R ankle strength to 4+/5 globally in order to improve tolerance to performing stairs in order to get down to his basement. 4. Pt will demonstrate improved functional activity tolerance as evident by an improved score on the FAAM to <30% functional impairment.                    Patient goals: \"get boot off, no crutches\"    Pt. Education:  [x] Yes  [] No  [x] Reviewed Prior HEP/Ed  Method of Education: [x] Verbal  [] Demo  [] Written  Comprehension of Education:  [x] Verbalizes understanding. [] Demonstrates understanding. [] Needs review. [] Demonstrates/verbalizes HEP/Ed previously given. Plan: [x] Continue per plan of care. [] Other:      Treatment Charges: Mins Units TIME   []  Modalities      [x]  Ther Exercise 29 2 1748-2610   []  Manual Therapy      []  Ther Activities      []  Aquatics      [x]  Neuromuscular 7 -- 10:    [] Vasocompression      [x] Gait Training 8 1 10:   [] Dry needling        [] 1 or 2 muscles        [] 3 or more muscles      []  Other      Total Treatment time 45 3 10:00-10:45      Time In: 10:00 am         Time Out: 10:45 am    Electronically signed by:   Toñito CARMONA  This documentation has been reviewed and entirety of treatment session with direct supervision by clinical instructor, Rupa Flanagan PT, DPT

## 2021-03-02 ENCOUNTER — HOSPITAL ENCOUNTER (OUTPATIENT)
Dept: PHYSICAL THERAPY | Age: 66
Setting detail: THERAPIES SERIES
Discharge: HOME | End: 2021-03-02
Payer: COMMERCIAL

## 2021-03-02 PROCEDURE — 97110 THERAPEUTIC EXERCISES: CPT

## 2021-03-02 NOTE — FLOWSHEET NOTE
[facility]   [address]   Phone: [phone]   Fax: [phone]    Physical Therapy Daily Treatment Note      Date:  3/2/2021  Patient Name:  Dwaine Silva    :  1955  MRN: [id number]  Physician: Dr. 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 Graham Arrant: 2021  Visit# / total visits:  Cancels/No Shows: 0/0    Subjective:  Pt states he has been doing very well with getting up and down hs basement stairs. Pt states he will put on his slipper and walk from his Lazy Boy to kitchen table without CAM boot on RLE. Pain:  [] Yes  [x] No Location:  Pain Rating: (0-10 scale)0/10  Pain altered Tx:  [x] No  [] Yes  Action:   Comments:         Objective:   Modalities: Vaso held this session d/t pt having an appt to get labs done. Precautions: (in CAM boot when ambulating) 50% Partial Wb'ing x 2 weeks (-), then 75% Partial Wb'ing x 2 weeks (-3/5), then WBAT if pt is comfortable.  **3/2/2021*full WB 3/12 for 2 weeks in CAM boot, full WB without CAM boot 3/26 as pt tolerates.     Please evaluate and treat, services as deemed appropriate by the therapist, including:  Gait training, range of motion, light strengthening as tolerated   Exercises:  Exercises:  Exercise       Reps/ Time Weight/ Level Comments Completed 3/2/2021     Sidelying hip abd RLE only 10x2    Inc to 2 sets this session    Supine DF + Inv 5x45\"   strap    Active ankle pumps 2x10    HEP     Supine HS stretch 3x 30\"    HEP x   Weight shifts (AP, ML) 10x ea Use of analog scale (75% WB)  **Inc to 75% WB per doctor    Transfers from sit<>stand 10x2 Use of analog scale (75% WB)  **Inc to 75% WB per doctor     Standing 3 way hip  10x2    WB on LLE and AROM on RLE seated rest break needed d/t fatigue  HEP. Added LIME TB x   SLR  10x2    HEP     Ascending/descending stairs 1x full flight of stairs  ascending with hand rail on R side and descending with rail on L side.  With LBQC and one UE support to mimic stairs at home     Seated BAPS  2x10  L2  A/P x    Seated LAQ 20x2 3#   x    Seated HSC  10x2 Blue TB   x    resisted DF/PF 10x2 LIME   x   Mini squats  10x2     x              Other:  HEP: Ankle pumps, HS stretch, SLR, Standing 3 way hip,     Specific Instructions for next treatment: Restore R ankle AROM and progress hip strengthening per protocol       Assessment: [x] Progressing toward goals. [] No change. [] Other:    [x] Patient would continue to benefit from skilled physical therapy services in order to: improve ROM and strength, normalize gait pattern and decrease pain in order to return to performing independent ADL's.     3/2/2021: Pt heavily educated on wearing CAM boot when amb at home even for short distances to prevent injury. Added seated strengthening LAQ, resisted ankle DF/PFHSC and marching and mini squats to inc strength in BLEs. Added Lime TB to standing 3 way hip. STG: (to be met in 8 treatments)  1. ? Pain: Decrease pain levels to < 2/10 with progression of strengthening and WBing exercises throughout therapy. 2. ? ROM: Pt will demonstrate full R ankle ROM per fusion protocol in order to improve gait mechanics and tolerance to prolonged standing and walking. 3. ? Strength: Pt will demonstrate increased R hip strength to 4+/5 globally in order to improve gait mechanics. 4. ? Function: Pt will demonstrate improved functional activity tolerance as evident by an improved score on the FAAM to <50% functional impairment. 5. Independent with Home Exercise Programs     LTG: (to be met in 16 treatments)  1. Reduce pain levels to no greater than 4-5/10 with prolonged standing and walking while performing ADL's once performing WBAT.   2. Pt

## 2021-03-04 ENCOUNTER — HOSPITAL ENCOUNTER (OUTPATIENT)
Dept: PHYSICAL THERAPY | Age: 66
Setting detail: THERAPIES SERIES
Discharge: HOME | End: 2021-03-04
Payer: COMMERCIAL

## 2021-03-04 PROCEDURE — 97110 THERAPEUTIC EXERCISES: CPT

## 2021-03-04 PROCEDURE — 97016 VASOPNEUMATIC DEVICE THERAPY: CPT

## 2021-03-04 NOTE — FLOWSHEET NOTE
[facility]   [address]   Phone: [phone]   Fax: [phone]    Physical Therapy Daily Treatment Note      Date:  3/4/2021  Patient Name:  Kaylee Herrera    :  1955  MRN: [id number]  Physician: Dr. 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 Luz Maria Black: 2021  Visit# / total visits:  Cancels/No Shows: 0/0    Subjective:    Pain:  [] Yes  [x] No Location: R ankle and foot Pain Rating: (0-10 scale)0/10  Pain altered Tx:  [x] No  [] Yes  Action:   Comments: Patient arrives without any new complaints, reporting he \"feels very good\". Objective:   Modalities: Vaso held this session d/t pt having an appt to get labs done. Precautions: (in CAM boot when ambulating) 50% Partial Wb'ing x 2 weeks (-), then 75% Partial Wb'ing x 2 weeks (-3/5), then WBAT if pt is comfortable. **3/2/2021*full WB 3/12 for 2 weeks in CAM boot, full WB without CAM boot 3/26 as pt tolerates.     Please evaluate and treat, services as deemed appropriate by the therapist, including:  Gait training, range of motion, light strengthening as tolerated   Exercises:  Exercises:  Exercise       Reps/ Time Weight/ Level Comments Completed 3/4/2021     Sidelying hip abd RLE only 10x2    Inc to 2 sets this session    Supine DF + Inv 5x45\"   strap    Active ankle pumps 2x10    HEP     Supine HS stretch 3x 30\"    HEP x   Weight shifts (AP, ML) 10x ea Use of analog scale (75% WB)  **Inc to 75% WB per doctor    Transfers from sit<>stand 10x2 Use of analog scale (75% WB)  **Inc to 75% WB per doctor     Standing 3 way hip  10x2  orange  WB on LLE and AROM on RLE seated rest break needed d/t fatigue  HEP.  Added LIME TB x   SLR  10x2    HEP x  Ascending/descending stairs 1x full flight of stairs  ascending with hand rail on R side and descending with rail on L side.  With LBQC and one UE support to mimic stairs at home     Seated BAPS  2x20  L2  A/P x    Seated LAQ 20x2 3#   x    Seated HS curl 10x2 Blue TB   x    resisted DF/PF 10x2 LIME   x   Mini squats  10x2     x             Other:  HEP: Ankle pumps, HS stretch, SLR, Standing 3 way hip,     Specific Instructions for next treatment: Restore R ankle AROM and progress hip strengthening per protocol     Objective:          Reassessed by primary PT 3/4/21 ROM  ° A/P STRENGTH     Left Right Left Right   Hip Flex     4+ 5   Ext           ER           IR           ABD       4+   ADD           Knee Flex           Ext           Ankle PF 28 25 5 --   DF  (knee straight) 15 2 5 --   DF   (knee flexed) 20 8 5 --   Inv 42 5 5 --   Ever 10 2 5 --   1st MTP DF           1st MTP PF                             Assessment: [x] Progressing toward goals. Reassessed STG this session. Pt with good compliance with HEP, reduced pain with activity, and improved bilateral hip strength and L ankle ROM/strength, however, pt continues to be limited in L ankle strength and ROM globally compared to left ankle. Continued with exercises per chart, added orange tband to 3 way hip and increased repetitions of seated BAPS. Pt demonstrates improved tolerance to standing activity without needing a sitting rest break between exercises. Ended session with vaso to R ankle to reduce pain and swelling. [] No change. [] Other:    [x] Patient would continue to benefit from skilled physical therapy services in order to: improve ROM and strength, normalize gait pattern and decrease pain in order to return to performing independent ADL's. STG: (to be met in 8 treatments)  1. ? Pain: Decrease pain levels to < 2/10 with progression of strengthening and WBing exercises throughout therapy.   MET 3/4/21  2. ? ROM: Pt will demonstrate full R ankle ROM per fusion protocol in order to improve gait mechanics and tolerance to prolonged standing and walking. Ongoing 3/4/21- Improvements made in PF and DF, still limited compared to LLE  3. ? Strength: Pt will demonstrate increased R hip strength to 4+/5 globally in order to improve gait mechanics. MET 3/4/21  4. ? Function: Pt will demonstrate improved functional activity tolerance as evident by an improved score on the FAAM to <50% functional impairment. 5. Independent with Home Exercise Programs MET 3/4/21     LTG: (to be met in 16 treatments)  1. Reduce pain levels to no greater than 4-5/10 with prolonged standing and walking while performing ADL's once performing WBAT. 2. Pt will demonstrate ability to ambulate x200 ft with least restrictive assistive device with minimal gait deficits once WBAT and weaned into comfortable sneaker. 3. Pt will increase R ankle strength to 4+/5 globally in order to improve tolerance to performing stairs in order to get down to his basement. 4. Pt will demonstrate improved functional activity tolerance as evident by an improved score on the FAAM to <30% functional impairment.                    Patient goals: \"get boot off, no crutches\"    Pt. Education:  [x] Yes  [] No  [x] Reviewed Prior HEP/Ed  Method of Education: [x] Verbal  [] Demo  [] Written  Comprehension of Education:  [x] Verbalizes understanding. [] Demonstrates understanding. [] Needs review. [] Demonstrates/verbalizes HEP/Ed previously given. Plan: [x] Continue per plan of care.    [] Other:      Treatment Charges: Mins Units TIME   []  Modalities      [x]  Ther Exercise 40 3 9330-9337   []  Manual Therapy      []  Ther Activities      []  Aquatics      []  Neuromuscular      [x] Vasocompression 15 1 8824-9574   [] Gait Training      [] Dry needling        [] 1 or 2 muscles        [] 3 or more muscles      []  Other      Total Treatment time 55 4      Time In: 10:00 am         Time Out: 10:55 am   Electronically signed by Navdeep CARMONA on 3/4/2021   This documentation has been reviewed and entirety of treatment session with direct supervision by clinical instructor, Rebeca Nash PT, DPT

## 2021-03-09 ENCOUNTER — HOSPITAL ENCOUNTER (OUTPATIENT)
Dept: PHYSICAL THERAPY | Age: 66
Setting detail: THERAPIES SERIES
Discharge: HOME | End: 2021-03-09
Payer: COMMERCIAL

## 2021-03-09 PROCEDURE — 97110 THERAPEUTIC EXERCISES: CPT

## 2021-03-09 NOTE — FLOWSHEET NOTE
[facility]   [address]   Phone: [phone]   Fax: [phone]    Physical Therapy Daily Treatment Note      Date:  3/9/2021  Patient Name:  Adrianna Terrazas    :  1955  MRN: [id number]  Physician: Dr. Harpreet iWnn MD       Insurance: Worker's Comp, 16 visits -  Diagnosis: M19.171 - Secondary localized osteoarthritis of right ankle and foot                Rehab Codes: M19.171, M25.571, M79.671, R26.2, M62.81   Onset Date: surgery 2020       Next Dr. Angelique Hua: 2021  Visit# / total visits:  Cancels/No Shows: 0/0    Subjective:  No new c/o pain. Pt states he has been trying to amb without his cane for short distances. Pain:  [] Yes  [x] No Location: R ankle and foot Pain Rating: (0-10 scale)0/10  Pain altered Tx:  [x] No  [] Yes  Action:   Comments:         Objective:   Modalities: Vaso held this session 3/9/2021  Precautions: (in CAM boot when ambulating) 50% Partial Wb'ing x 2 weeks (-), then 75% Partial Wb'ing x 2 weeks (-3/5), then WBAT if pt is comfortable.  **3/2/2021*full WB 3/12 for 2 weeks in CAM boot, full WB without CAM boot 3/26 as pt tolerates.     Please evaluate and treat, services as deemed appropriate by the therapist, including:  Gait training, range of motion, light strengthening as tolerated   Exercises:  Exercises:  Exercise       Reps/ Time Weight/ Level Comments Completed 3/9/2021     Sidelying hip abd RLE only 10x2    Inc to 2 sets this session    Supine DF + Inv 5x45\"   strap    Active ankle pumps 2x10    HEP     Supine HS stretch 3x 30\"    HEP    Weight shifts (AP, ML) 10x ea Use of analog scale (75% WB)  **Inc to 75% WB per doctor    Transfers from sit<>stand 10x2 Use of analog scale (75% WB)  **Inc to 75% WB per doctor     Standing 3 way hip  10x2  Lime; AROM performed on BLEs this session x   SLR  10x2    HEP     Ascending/descending stairs 1x full flight of stairs  ascending with hand rail on R side and descending with rail on L side.  With LBQC and one UE support to mimic stairs at home     Seated BAPS  2x20  L2  A/P x   Seated Marching 10x2 3#  x    Seated LAQ 20x2 3#   x    Seated HS curl 10x2 Blue TB   x    resisted DF/PF 10x2 LIME   x   Mini squats  10x2     x   Gait without LBQC 2 laps around gym     x   Step ups fd/lat 8\"  On RLE  x   Other:  HEP: Ankle pumps, HS stretch, SLR, Standing 3 way hip,     Specific Instructions for next treatment: Restore R ankle AROM and progress hip strengthening per protocol     Objective:          Reassessed by primary PT 3/4/21 ROM  ° A/P STRENGTH     Left Right Left Right   Hip Flex     4+ 5   Ext           ER           IR           ABD       4+   ADD           Knee Flex           Ext           Ankle PF 28 25 5 --   DF  (knee straight) 15 2 5 --   DF   (knee flexed) 20 8 5 --   Inv 42 5 5 --   Ever 10 2 5 --   1st MTP DF           1st MTP PF                             Assessment: [x] Progressing toward goals. [] No change. [] Other:    [x] Patient would continue to benefit from skilled physical therapy services in order to: improve ROM and strength, normalize gait pattern and decrease pain in order to return to performing independent ADL's.     3/9/2021: VC/TC needed to encourage proper posture throughout standing TB hip exercises. Pt performing seated BAPs A/P: pt tried circumduction but is not able to perform ER well. Pt tolerated WB through BLEs well with very mild discomfort felt in R heel during TB exercises. Added gait without LBQC; no inc in pain or LOB noted during gait. Added step ups fwd/lat with RLE without difficulty. Pt refused vaso this session. STG: (to be met in 8 treatments)  1. ? Pain: Decrease pain levels to < 2/10 with progression of strengthening and WBing exercises throughout therapy.   MET 3/4/21  2. ? ROM: Pt will demonstrate full R ankle ROM per fusion protocol in order to improve gait mechanics and tolerance to prolonged standing and walking. Ongoing 3/4/21- Improvements made in PF and DF, still limited compared to LLE  3. ? Strength: Pt will demonstrate increased R hip strength to 4+/5 globally in order to improve gait mechanics. MET 3/4/21  4. ? Function: Pt will demonstrate improved functional activity tolerance as evident by an improved score on the FAAM to <50% functional impairment. 5. Independent with Home Exercise Programs MET 3/4/21     LTG: (to be met in 16 treatments)  1. Reduce pain levels to no greater than 4-5/10 with prolonged standing and walking while performing ADL's once performing WBAT. 2. Pt will demonstrate ability to ambulate x200 ft with least restrictive assistive device with minimal gait deficits once WBAT and weaned into comfortable sneaker. 3. Pt will increase R ankle strength to 4+/5 globally in order to improve tolerance to performing stairs in order to get down to his basement. 4. Pt will demonstrate improved functional activity tolerance as evident by an improved score on the FAAM to <30% functional impairment.                    Patient goals: \"get boot off, no crutches\"    Pt. Education:  [x] Yes  [] No  [x] Reviewed Prior HEP/Ed  Method of Education: [x] Verbal  [] Demo  [] Written  Comprehension of Education:  [x] Verbalizes understanding. [] Demonstrates understanding. [] Needs review. [] Demonstrates/verbalizes HEP/Ed previously given. Plan: [x] Continue per plan of care.    [] Other:      Treatment Charges: Mins Units TIME   []  Modalities      [x]  Ther Exercise 40 3 5888-4908   []  Manual Therapy      []  Ther Activities      []  Aquatics      []  Neuromuscular      [] Vasocompression      [x] Gait Training 5 -    [] Dry needling        [] 1 or 2 muscles        [] 3 or more muscles      []  Other      Total Treatment time 40 3 1047-5559     Time In: 10:00 am         Time Out: 10:40 am   Electronically signed by Dallie Cockayne, PTA

## 2021-03-12 ENCOUNTER — HOSPITAL ENCOUNTER (OUTPATIENT)
Dept: PHYSICAL THERAPY | Age: 66
Setting detail: THERAPIES SERIES
Discharge: HOME | End: 2021-03-12
Payer: COMMERCIAL

## 2021-03-12 NOTE — PROGRESS NOTES
[facility]   Outpatient Physical Therapy  Cancel/No Show Note    Date: 3/12/21    Patient Name: Dwaine Silva        MRN: [id number]  Account: [de-identified] : 1955      Physician: Dr. 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 Graham Arrant: 2021  Visit# / total visits:         Cancels/No Shows: 1/0    Patient canceled due to getting second COVID vaccine.      Kirsten Presley, PTA

## 2021-03-16 ENCOUNTER — HOSPITAL ENCOUNTER (OUTPATIENT)
Dept: PHYSICAL THERAPY | Age: 66
Setting detail: THERAPIES SERIES
Discharge: HOME | End: 2021-03-16
Payer: COMMERCIAL

## 2021-03-16 PROCEDURE — 97110 THERAPEUTIC EXERCISES: CPT

## 2021-03-16 NOTE — FLOWSHEET NOTE
[facility]   [address]   Phone: [phone]   Fax: [phone]    Physical Therapy Daily Treatment Note      Date:  3/16/2021  Patient Name:  Fredrick Swan    :  1955  MRN: [id number]  Physician: Dr. 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 Eric Mccracken: 2021  Visit# / total visits:  Cancels/No Shows: 0/0    Subjective: Pt has no new c/o pain this session. Pt states he can't wait to get rid of his boot. Pain:  [] Yes  [x] No Location: R ankle and foot Pain Rating: (0-10 scale)0/10  Pain altered Tx:  [x] No  [] Yes  Action:   Comments:         Objective:   Modalities: Vaso held this session 3/16/2021  Precautions: (in CAM boot when ambulating) 50% Partial Wb'ing x 2 weeks (-), then 75% Partial Wb'ing x 2 weeks (-3/5), then WBAT if pt is comfortable.  **3/2/2021*full WB 3/12 for 2 weeks in CAM boot, full WB without CAM boot 3/26 as pt tolerates.     Please evaluate and treat, services as deemed appropriate by the therapist, including:  Gait training, range of motion, light strengthening as tolerated   Exercises:  Exercises:  Exercise       Reps/ Time Weight/ Level Comments Completed 3/16/2021     Sidelying hip abd RLE only 10x2       Supine DF + Inv 5x45\"   strap    Active ankle pumps 2x10    HEP x    Supine HS stretch 3x 30\"    HEP    Weight shifts (AP, ML) 10x ea Use of analog scale (75% WB)  **Inc to 75% WB per doctor    Transfers from sit<>stand 10x2 Use of analog scale (75% WB)  **Inc to 75% WB per doctor     Standing 3 way hip  10x2 lime  x   SLR  10x2    HEP x    Ascending/descending stairs 1x full flight of stairs  ascending with hand rail on R side and descending with rail on L side.  With LBQC mechanics and tolerance to prolonged standing and walking. Ongoing 3/4/21- Improvements made in PF and DF, still limited compared to LLE  3. ? Strength: Pt will demonstrate increased R hip strength to 4+/5 globally in order to improve gait mechanics. MET 3/4/21  4. ? Function: Pt will demonstrate improved functional activity tolerance as evident by an improved score on the FAAM to <50% functional impairment. 5. Independent with Home Exercise Programs MET 3/4/21     LTG: (to be met in 16 treatments)  1. Reduce pain levels to no greater than 4-5/10 with prolonged standing and walking while performing ADL's once performing WBAT. 2. Pt will demonstrate ability to ambulate x200 ft with least restrictive assistive device with minimal gait deficits once WBAT and weaned into comfortable sneaker. 3. Pt will increase R ankle strength to 4+/5 globally in order to improve tolerance to performing stairs in order to get down to his basement. 4. Pt will demonstrate improved functional activity tolerance as evident by an improved score on the FAAM to <30% functional impairment.                    Patient goals: \"get boot off, no crutches\"    Pt. Education:  [x] Yes  [] No  [x] Reviewed Prior HEP/Ed  Method of Education: [x] Verbal  [] Demo  [] Written  Comprehension of Education:  [x] Verbalizes understanding. [] Demonstrates understanding. [] Needs review. [] Demonstrates/verbalizes HEP/Ed previously given. Plan: [x] Continue per plan of care.    [] Other:      Treatment Charges: Mins Units TIME   []  Modalities      [x]  Ther Exercise 40 3 1263-7780   [x]  Manual Therapy 9 - 5285-4832   []  Ther Activities      []  Aquatics      []  Neuromuscular      [] Vasocompression      [] Gait Training      [] Dry needling        [] 1 or 2 muscles        [] 3 or more muscles      []  Other      Total Treatment time 45 3 0139-1794     Time In: 10:00 am         Time Out: 10:45am   Electronically signed by Rachel Sanchez PTA

## 2021-03-18 ENCOUNTER — HOSPITAL ENCOUNTER (OUTPATIENT)
Dept: PHYSICAL THERAPY | Age: 66
Setting detail: THERAPIES SERIES
Discharge: HOME | End: 2021-03-18
Payer: COMMERCIAL

## 2021-03-18 PROCEDURE — 97110 THERAPEUTIC EXERCISES: CPT

## 2021-03-18 NOTE — FLOWSHEET NOTE
[facility]   [address]   Phone: [phone]   Fax: [phone]    Physical Therapy Daily Treatment Note      Date:  3/18/2021  Patient Name:  Adrianna Terrazas    :  1955  MRN: [id number]  Physician: Dr. 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 Angelique Hua: 2021  Visit# / total visits:  Cancels/No Shows: 0/0    Subjective:   Pain:  [] Yes  [x] No Location: R ankle and foot Pain Rating: (0-10 scale)0/10  Pain altered Tx:  [x] No  [] Yes  Action:   Comments: Pt arrives without pain in R foot. Pt with no complaints this date. Objective:   Modalities: Vaso held this session 3/18/2021  Precautions: (in CAM boot when ambulating) 50% Partial Wb'ing x 2 weeks (-), then 75% Partial Wb'ing x 2 weeks (-3/5), then WBAT if pt is comfortable.  **3/2/2021*full WB 3/12 for 2 weeks in CAM boot, full WB without CAM boot 3/26 as pt tolerates.     Please evaluate and treat, services as deemed appropriate by the therapist, including:  Gait training, range of motion, light strengthening as tolerated     Exercises:  Exercises:  Exercise       Reps/ Time Weight/ Level Comments Completed 3/18/2021     Sidelying hip abd RLE only 10x2    x   Supine DF + Inv 5x45\"   strap x   Active ankle pumps 2x10    HEP x   Supine HS stretch 3x 30\"    HEP    Weight shifts (AP, ML) 10x ea Use of analog scale (75% WB)  **Inc to 75% WB per doctor    Transfers from sit<>stand 10x2 Use of analog scale (75% WB)  **Inc to 75% WB per doctor    Standing 3 way hip  10x2 lime  x   SLR  10x2    HEP x   Ascending/descending stairs 1x full flight of stairs  ascending with hand rail on R side and descending with rail on L side.  With LBQC and one UE support to mimic stairs at home    Seated BAPS  2x20  L2  A/P x   Seated Marching 10x2 3#     Seated LAQ 20x2 3#   x   Seated HS curl 10x2 Blue TB   x   resisted DF/PF 10x2 LIME   x   Mini squats  10x2     x   Resisted side stepping 6x Lime // bars x   Gait without LBQC 2 laps around gym        Step ups fwd/lat 10x Aerobic  On RLE x   Other:  HEP: Ankle pumps, HS stretch, SLR, Standing 3 way hip,   MANUAL 3/16/2021: Hypervolt to R quad to dec mm tightness 5'     Specific Instructions for next treatment: Restore R ankle AROM and progress hip strengthening per protocol     Objective:          Reassessed by primary PT 3/4/21 ROM  ° A/P STRENGTH     Left Right Left Right   Hip Flex     4+ 5   Ext           ER           IR           ABD       4+   ADD           Knee Flex           Ext           Ankle PF 28 25 5 --   DF  (knee straight) 15 2 5 --   DF   (knee flexed) 20 8 5 --   Inv 42 5 5 --   Ever 10 2 5 --   1st MTP DF           1st MTP PF                             Assessment: [x] Progressing toward goals. Continued with exercises per chart. Pt with good tolerance to all exercises this session. Improvements noted in fatigue with only one rest break required with standing exercises. Added side stepping in parallel bars and forward and lateral step ups this session. Pt able to perform exercises with appropriate technique and no c/o pain or soreness. Pt denied vaso at end of session and plans to ice at home. Pt continues to report that he \"can't wait to take his CAM boot off\" for ambulation and admits that he has been attempting to ambulate short distances at home without his boot- emphasized pt on importance of remaining in CAM boot until cleared by Dr. Dayanara Monreal. [] No change. [] Other:    [x] Patient would continue to benefit from skilled physical therapy services in order to: improve ROM and strength, normalize gait pattern and decrease pain in order to return to performing independent ADL's.            STG: (to be met in 8 treatments)  1. ? Pain: Decrease pain levels to < 2/10 with progression of strengthening and WBing exercises throughout therapy. MET 3/4/21  2. ? ROM: Pt will demonstrate full R ankle ROM per fusion protocol in order to improve gait mechanics and tolerance to prolonged standing and walking. Ongoing 3/4/21- Improvements made in PF and DF, still limited compared to LLE  3. ? Strength: Pt will demonstrate increased R hip strength to 4+/5 globally in order to improve gait mechanics. MET 3/4/21  4. ? Function: Pt will demonstrate improved functional activity tolerance as evident by an improved score on the FAAM to <50% functional impairment. 5. Independent with Home Exercise Programs MET 3/4/21     LTG: (to be met in 16 treatments)  1. Reduce pain levels to no greater than 4-5/10 with prolonged standing and walking while performing ADL's once performing WBAT. 2. Pt will demonstrate ability to ambulate x200 ft with least restrictive assistive device with minimal gait deficits once WBAT and weaned into comfortable sneaker. 3. Pt will increase R ankle strength to 4+/5 globally in order to improve tolerance to performing stairs in order to get down to his basement. 4. Pt will demonstrate improved functional activity tolerance as evident by an improved score on the FAAM to <30% functional impairment.                    Patient goals: \"get boot off, no crutches\"    Pt. Education:  [x] Yes  [] No  [x] Reviewed Prior HEP/Ed  Method of Education: [x] Verbal  [] Demo  [] Written  Comprehension of Education:  [x] Verbalizes understanding. [] Demonstrates understanding. [] Needs review. [] Demonstrates/verbalizes HEP/Ed previously given. Plan: [x] Continue per plan of care.    [] Other:      Treatment Charges: Mins Units TIME   []  Modalities      [x]  Ther Exercise 38 3 9:58-10:36   []  Manual Therapy      []  Ther Activities      []  Aquatics      []  Neuromuscular      [] Vasocompression      [] Gait Training [] Dry needling        [] 1 or 2 muscles        [] 3 or more muscles      []  Other      Total Treatment time 38 3 9:58-10:36     Time In: 9:58 am         Time Out: 10:36 am  Electronically signed by Malathi CARMONA  This documentation has been reviewed and entirety of treatment session with direct supervision by clinical instructor, Nara Larsen PT, DPT

## 2021-03-23 ENCOUNTER — HOSPITAL ENCOUNTER (OUTPATIENT)
Dept: PHYSICAL THERAPY | Age: 66
Setting detail: THERAPIES SERIES
Discharge: HOME | End: 2021-03-23
Payer: COMMERCIAL

## 2021-03-23 PROCEDURE — 97110 THERAPEUTIC EXERCISES: CPT

## 2021-03-23 NOTE — FLOWSHEET NOTE
[facility]   [address]   Phone: [phone]   Fax: [phone]    Physical Therapy Daily Treatment Note      Date:  3/23/2021  Patient Name:  Birgit Booker    :  1955  MRN: [id number]  Physician: Dr. 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 Victor Noa: 2021  Visit# / total visits: 10/16 Cancels/No Shows: 0/0    Subjective: No c/o pain this session. Pt reports pain/discomfort when walking long distances and standing for long periods of time. Pain:  [] Yes  [x] No Location: R ankle and foot Pain Rating: (0-10 scale)0/10  Pain altered Tx:  [x] No  [] Yes  Action:   Comments:         Objective:   Modalities: Vaso held this session 3/18/2021  Precautions: (in CAM boot when ambulating) 50% Partial Wb'ing x 2 weeks (-), then 75% Partial Wb'ing x 2 weeks (-3/5), then WBAT if pt is comfortable.  **3/2/2021*full WB 3/12 for 2 weeks in CAM boot, full WB without CAM boot 3/26 as pt tolerates.     Please evaluate and treat, services as deemed appropriate by the therapist, including:  Gait training, range of motion, light strengthening as tolerated     Exercises:  Exercises:  Exercise       Reps/ Time Weight/ Level Comments Completed 3/23/2021     Sidelying hip abd RLE only 10x2       Supine DF + Inv 5x45\"   strap    Active ankle pumps 2x10    HEP    Supine HS stretch 3x 30\"    HEP    Weight shifts (AP, ML) 10x ea Use of analog scale (75% WB)  **Inc to 75% WB per doctor    Transfers from sit<>stand 10x2 Use of analog scale (75% WB)  **Inc to 75% WB per doctor    Standing 3 way hip  10x2 lime  x   SLR  10x2    HEP    Ascending/descending stairs 1x full flight of stairs  ascending with hand rail on R side and descending with rail on L side.  With LBQC and one UE support to mimic stairs at home    Seated BAPS  2x20  L2  A/P    Seated Marching 10x2 3#     Seated LAQ 20x2 3#      Seated HS curl 10x2 Blue TB      resisted DF/PF 10x2 LIME   x   Mini squats  10x2     x   Resisted amb 6x Lime // bars: fwd/retro/side stepping x   Gait without LBQC 2 laps around gym        Step ups fwd/lat 10x Aerobic  On RLE x   NuStep 10' lvl 4  x   Other:  HEP: Ankle pumps, HS stretch, SLR, Standing 3 way hip,   MANUAL 3/23/2021: Hypervolt to R quad to dec mm tightness 5'- held    Specific Instructions for next treatment: Restore R ankle AROM and progress hip strengthening per protocol     Objective:          Reassessed by primary PT 3/4/21 ROM  ° A/P STRENGTH     Left Right Left Right   Hip Flex     4+ 5   Ext           ER           IR           ABD       4+   ADD           Knee Flex           Ext           Ankle PF 28 25 5 --   DF  (knee straight) 15 2 5 --   DF   (knee flexed) 20 8 5 --   Inv 42 5 5 --   Ever 10 2 5 --   1st MTP DF           1st MTP PF                             Assessment: [x] Progressing toward goals. [] No change. [] Other:    [x] Patient would continue to benefit from skilled physical therapy services in order to: improve ROM and strength, normalize gait pattern and decrease pain in order to return to performing independent ADL's.     3/23/2021: VC needed for proper posture and to slow down when performing tasks to improve technique. Added NuStep to inc activity tolerance and strengthening to be able to perform daily routine activities for longer durations of time. Pt ed on maintaining WB precautions with CAM boot and was advised to bring a shoe for corresponding foot to progress to WB in shoe. STG: (to be met in 8 treatments)  1. ? Pain: Decrease pain levels to < 2/10 with progression of strengthening and WBing exercises throughout therapy.   MET 3/4/21  2. ? ROM: Pt will demonstrate full R ankle ROM per fusion protocol in order to improve gait mechanics and tolerance to prolonged standing and walking. Ongoing 3/4/21- Improvements made in PF and DF, still limited compared to LLE  3. ? Strength: Pt will demonstrate increased R hip strength to 4+/5 globally in order to improve gait mechanics. MET 3/4/21  4. ? Function: Pt will demonstrate improved functional activity tolerance as evident by an improved score on the FAAM to <50% functional impairment. 5. Independent with Home Exercise Programs MET 3/4/21     LTG: (to be met in 16 treatments)  1. Reduce pain levels to no greater than 4-5/10 with prolonged standing and walking while performing ADL's once performing WBAT. 2. Pt will demonstrate ability to ambulate x200 ft with least restrictive assistive device with minimal gait deficits once WBAT and weaned into comfortable sneaker. 3. Pt will increase R ankle strength to 4+/5 globally in order to improve tolerance to performing stairs in order to get down to his basement. 4. Pt will demonstrate improved functional activity tolerance as evident by an improved score on the FAAM to <30% functional impairment.                    Patient goals: \"get boot off, no crutches\"    Pt. Education:  [x] Yes  [] No  [x] Reviewed Prior HEP/Ed  Method of Education: [x] Verbal  [] Demo  [] Written  Comprehension of Education:  [x] Verbalizes understanding. [] Demonstrates understanding. [] Needs review. [] Demonstrates/verbalizes HEP/Ed previously given. Plan: [x] Continue per plan of care.    [] Other:      Treatment Charges: Mins Units TIME   []  Modalities      [x]  Ther Exercise 45 3 1841-0107   []  Manual Therapy      []  Ther Activities      []  Aquatics      []  Neuromuscular      [] Vasocompression      [] Gait Training      [] Dry needling        [] 1 or 2 muscles        [] 3 or more muscles      []  Other      Total Treatment time 45 3 8789-2608     Time In: 1000       Time Out: 1256  Electronically signed by Rosario Aguiar Donelettya Mary Jo, PTA

## 2021-03-25 ENCOUNTER — HOSPITAL ENCOUNTER (OUTPATIENT)
Dept: PHYSICAL THERAPY | Age: 66
Setting detail: THERAPIES SERIES
Discharge: HOME | End: 2021-03-25
Payer: COMMERCIAL

## 2021-03-25 PROCEDURE — 97110 THERAPEUTIC EXERCISES: CPT

## 2021-03-25 PROCEDURE — 97016 VASOPNEUMATIC DEVICE THERAPY: CPT

## 2021-03-25 NOTE — FLOWSHEET NOTE
[address] [place] [address] Outpatient Physical Therapy   [address] [facility] [address]   Phone: [phone]   Fax: [phone]    Physical Therapy Daily Treatment Note      Date:  3/25/2021  Patient Name:  Los Traore    :  1955  MRN: [id number]  Physician: Dr. 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 Abbie Alexander: 2021  Visit# / total visits:  Cancels/No Shows: 0/0    Subjective: Pt states ever since he go up this morning, his heel has been bothering him. Pain:  [] Yes  [x] No Location: R ankle and foot Pain Rating: (0-10 scale)0/10  Pain altered Tx:  [x] No  [] Yes  Action:   Comments:         Objective:   Modalities: Vaso held this session 3/25/2021  Precautions: (in CAM boot when ambulating) 50% Partial Wb'ing x 2 weeks (-), then 75% Partial Wb'ing x 2 weeks (-3/5), then WBAT if pt is comfortable.  **3/2/2021*full WB 3/12 for 2 weeks in CAM boot, full WB without CAM boot 3/26 as pt tolerates.     Please evaluate and treat, services as deemed appropriate by the therapist, including:  Gait training, range of motion, light strengthening as tolerated     Exercises:  Exercises:  Exercise       Reps/ Time Weight/ Level Comments Completed 3/25/2021     Sidelying hip abd RLE only 10x2       Supine DF + Inv 5x45\"   strap    Active ankle pumps 2x10    HEP    Supine HS stretch 3x 30\"    HEP    Weight shifts (AP, ML) 10x ea Use of analog scale (75% WB)  **Inc to 75% WB per doctor    Transfers from sit<>stand 10x2 Use of analog scale (75% WB)  **Inc to 75% WB per doctor    Standing 3 way hip  10x2 lime  x   SLR  10x2    HEP    Ascending/descending stairs 1x full flight of stairs  ascending with hand rail on R side and descending with rail on L side.  With LBQC and one UE support to mimic stairs at home    Seated BAPS  2x20  L2  A/P    Seated Marching 10x2 3#     Seated LAQ 20x2 3#      Seated HS curl 10x2 Blue TB      resisted DF/PF 10x2 LIME      Mini squats  10x2     x   Resisted amb free motion 4x 20#  fwd/retro/side stepping x   Gait without LBQC 2 laps around gym        Step ups fwd/lat 10x Aerobic  On BLEs x   NuStep 10' lvl 4     Other:  HEP: Ankle pumps, HS stretch, SLR, Standing 3 way hip,   MANUAL 3/23/2021: Hypervolt to R quad to dec mm tightness 5'- held    Specific Instructions for next treatment: Restore R ankle AROM and progress hip strengthening per protocol     Objective:          Reassessed by primary PT 3/4/21 ROM  ° A/P STRENGTH     Left Right Left Right   Hip Flex     4+ 5   Ext           ER           IR           ABD       4+   ADD           Knee Flex           Ext           Ankle PF 28 25 5 --   DF  (knee straight) 15 2 5 --   DF   (knee flexed) 20 8 5 --   Inv 42 5 5 --   Ever 10 2 5 --   1st MTP DF           1st MTP PF                             Assessment: [x] Progressing toward goals. [] No change. [] Other:    [x] Patient would continue to benefit from skilled physical therapy services in order to: improve ROM and strength, normalize gait pattern and decrease pain in order to return to performing independent ADL's.     3/25/2021:  Pt needed rest breaks more often this session d/t fatigue and discomfort in R heel. Added resisted walking with straight cane to improve strength and balance. Pt states side stepping to L side with resisted walking is difficult and feels like his L hip is weak. Pt ed to bring his shoe for RLE to begin transition from boot. STG: (to be met in 8 treatments)  1. ? Pain: Decrease pain levels to < 2/10 with progression of strengthening and WBing exercises throughout therapy.   MET 3/4/21  2. ? ROM: Pt will demonstrate full R ankle ROM per fusion protocol in order to improve gait mechanics and tolerance to prolonged standing and walking. Ongoing 3/4/21- Improvements made in PF and DF, still limited compared to LLE  3. ? Strength: Pt will demonstrate increased R hip strength to 4+/5 globally in order to improve gait mechanics. MET 3/4/21  4. ? Function: Pt will demonstrate improved functional activity tolerance as evident by an improved score on the FAAM to <50% functional impairment. 5. Independent with Home Exercise Programs MET 3/4/21     LTG: (to be met in 16 treatments)  1. Reduce pain levels to no greater than 4-5/10 with prolonged standing and walking while performing ADL's once performing WBAT. 2. Pt will demonstrate ability to ambulate x200 ft with least restrictive assistive device with minimal gait deficits once WBAT and weaned into comfortable sneaker. 3. Pt will increase R ankle strength to 4+/5 globally in order to improve tolerance to performing stairs in order to get down to his basement. 4. Pt will demonstrate improved functional activity tolerance as evident by an improved score on the FAAM to <30% functional impairment.                    Patient goals: \"get boot off, no crutches\"    Pt. Education:  [x] Yes  [] No  [x] Reviewed Prior HEP/Ed  Method of Education: [x] Verbal  [] Demo  [] Written  Comprehension of Education:  [x] Verbalizes understanding. [] Demonstrates understanding. [] Needs review. [] Demonstrates/verbalizes HEP/Ed previously given. Plan: [x] Continue per plan of care.    [] Other:      Treatment Charges: Mins Units TIME   []  Modalities      [x]  Ther Exercise 30 2 [id number]   []  Manual Therapy      []  Ther Activities      []  Aquatics      []  Neuromuscular      [x] Vasocompression 15 1 5436-3151   [] Gait Training      [] Dry needling        [] 1 or 2 muscles        [] 3 or more muscles      []  Other      Total Treatment time 45 3 4825-3965     Time In: 1000       Time Out: 0147  Electronically signed by Shantal Leo PTA

## 2021-03-29 ENCOUNTER — HOSPITAL ENCOUNTER (OUTPATIENT)
Dept: PHYSICAL THERAPY | Age: 66
Setting detail: THERAPIES SERIES
Discharge: HOME | End: 2021-03-29
Payer: COMMERCIAL

## 2021-03-29 PROCEDURE — 97016 VASOPNEUMATIC DEVICE THERAPY: CPT

## 2021-03-29 PROCEDURE — 97110 THERAPEUTIC EXERCISES: CPT

## 2021-03-29 NOTE — FLOWSHEET NOTE
[address] [facility] Outpatient Physical Therapy   [address]   Phone: [phone]   Fax: [phone]    Physical Therapy Daily Treatment Note      Date:  3/29/2021  Patient Name:  Sandra Stockton    :  1955  MRN: [id number]  Physician: Dr. 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 Gladys Allen: 2021  Visit# / total visits:  Cancels/No Shows: 0/0    Subjective: Pt states he has had boot on all afternoon, states he was walking around in home without and it felt fine, states he doesn't feel unstable without boot just feels \"funny\" like he's \"naked\" without it.    Pain:  [] Yes  [x] No Location: R ankle and foot Pain Rating: (0-10 scale)0/10  Pain altered Tx:  [x] No  [] Yes  Action:   Comments:         Objective:   Modalities: Vaso low pressure 34* 15' R ankle   Precautions: (in CAM boot when ambulating) 50% Partial Wb'ing x 2 weeks (-), then 75% Partial Wb'ing x 2 weeks (-3/5), then WBAT if pt is comfortable.      Please evaluate and treat, services as deemed appropriate by the therapist, including:  Gait training, range of motion, light strengthening as tolerated     Exercises:  Exercises:  Exercise       Reps/ Time Weight/ Level Comments Completed 3/29/2021     Sidelying hip abd RLE only 10x2       Supine DF + Inv 5x45\"   strap    Active ankle pumps 2x10    HEP    Supine HS stretch 3x 30\"    HEP    Weight shifts (AP, ML) 10x ea Use of analog scale (75% WB)  **Inc to 75% WB per doctor    Transfers from sit<>stand 10x2 Use of analog scale (75% WB)  **Inc to 75% WB per doctor    Standing 3 way hip  10x2  No resistance  x   SB calf stretch  30\"x2    x   amb in //bars  5x Fwd/retro/lateral  x   Cone step overs  2x  Fwd/lateral   x Ascending/descending stairs 1x full flight of stairs  ascending with hand rail on R side and descending with rail on L side.  With LBQC and one UE support to mimic stairs at home    Seated BAPS  10x  L2  A/P x   Seated Marching 10x2 3#     Seated LAQ 20x2 3#      Seated HS curl 10x2 Blue TB      resisted DF/PF 10x2 LIME      Mini squats  10x2     x   Resisted amb free motion 4x 20#  fwd/retro/side stepping x   Gait without LBQC 2 laps around gym        Step ups fwd/lat 10x 6' On BLEs x   NuStep 10' lvl 4     Other:  HEP: Ankle pumps, HS stretch, SLR, Standing 3 way hip,   MANUAL 3/23/2021: Hypervolt to R quad to dec mm tightness 5'- held    Specific Instructions for next treatment: Restore R ankle AROM and progress hip strengthening per protocol     Objective:          Reassessed by primary PT 3/4/21 ROM  ° A/P STRENGTH     Left Right Left Right   Hip Flex     4+ 5   Ext           ER           IR           ABD       4+   ADD           Knee Flex           Ext           Ankle PF 28 25 5 --   DF  (knee straight) 15 2 5 --   DF   (knee flexed) 20 8 5 --   Inv 42 5 5 --   Ever 10 2 5 --   1st MTP DF           1st MTP PF                             Assessment: [x] Progressing toward goals. [] No change. [] Other:    [x] Patient would continue to benefit from skilled physical therapy services in order to: improve ROM and strength, normalize gait pattern and decrease pain in order to return to performing independent ADL's.     3/29/2021:  Completed exercise program without CAM boot this date per protocol, patient is full WB as tolerated in shoe. Patient has no reports of increased pain, states his side/side motion feels very \"stable\" due to fusion. Patient required short seated rest breaks throughout treatment secondary to fatigue. Once session ended Primary PT recommended to patient to continue amb with CAM boot post session.      STG: (to be met in 8 treatments)  1. ? Pain: Decrease pain levels to < 2/10 with progression of strengthening and WBing exercises throughout therapy. MET 3/4/21  2. ? ROM: Pt will demonstrate full R ankle ROM per fusion protocol in order to improve gait mechanics and tolerance to prolonged standing and walking. Ongoing 3/4/21- Improvements made in PF and DF, still limited compared to LLE  3. ? Strength: Pt will demonstrate increased R hip strength to 4+/5 globally in order to improve gait mechanics. MET 3/4/21  4. ? Function: Pt will demonstrate improved functional activity tolerance as evident by an improved score on the FAAM to <50% functional impairment. 5. Independent with Home Exercise Programs MET 3/4/21     LTG: (to be met in 16 treatments)  1. Reduce pain levels to no greater than 4-5/10 with prolonged standing and walking while performing ADL's once performing WBAT. 2. Pt will demonstrate ability to ambulate x200 ft with least restrictive assistive device with minimal gait deficits once WBAT and weaned into comfortable sneaker. 3. Pt will increase R ankle strength to 4+/5 globally in order to improve tolerance to performing stairs in order to get down to his basement. 4. Pt will demonstrate improved functional activity tolerance as evident by an improved score on the FAAM to <30% functional impairment.                    Patient goals: \"get boot off, no crutches\"    Pt. Education:  [x] Yes  [] No  [x] Reviewed Prior HEP/Ed  Method of Education: [x] Verbal  [] Demo  [] Written  Comprehension of Education:  [x] Verbalizes understanding. [] Demonstrates understanding. [] Needs review. [] Demonstrates/verbalizes HEP/Ed previously given. Plan: [x] Continue per plan of care.    [] Other:      Treatment Charges: Mins Units TIME   []  Modalities      [x]  Ther Exercise 30 2 350pm-420pm   []  Manual Therapy      []  Ther Activities      []  Aquatics      []  Neuromuscular      [x] Vasocompression 15 1 420-435pm   [] Gait Training      [] Dry needling        [] 1 or 2 muscles [] 3 or more muscles      []  Other      Total Treatment time 45 3 350-435pm     Time In: 350      Time Out: 435pm    Electronically signed by Jocy Allison PTA

## 2021-03-31 ENCOUNTER — HOSPITAL ENCOUNTER (OUTPATIENT)
Dept: PHYSICAL THERAPY | Age: 66
Setting detail: THERAPIES SERIES
Discharge: HOME | End: 2021-03-31
Payer: COMMERCIAL

## 2021-03-31 PROCEDURE — 97110 THERAPEUTIC EXERCISES: CPT

## 2021-03-31 NOTE — FLOWSHEET NOTE
control noted when attempted foot over foot. Cold Compression not available this date- Ice pack to ankle/achilles after exercise to decrease pain and swelling. Patient left clinic in CAM boot    [] No change. [] Other:    [x] Patient would continue to benefit from skilled physical therapy services in order to: improve ROM and strength, normalize gait pattern and decrease pain in order to return to performing independent ADL's. STG: (to be met in 8 treatments) Goals addressed by evaluating PT  1. ? Pain: Decrease pain levels to < 2/10 with progression of strengthening and WBing exercises throughout therapy. MET 3/4/21  2. ? ROM: Pt will demonstrate full R ankle ROM per fusion protocol in order to improve gait mechanics and tolerance to prolonged standing and walking. Ongoing 3/4/21- Improvements made in PF and DF, still limited compared to LLE  3. ? Strength: Pt will demonstrate increased R hip strength to 4+/5 globally in order to improve gait mechanics. MET 3/4/21  4. ? Function: Pt will demonstrate improved functional activity tolerance as evident by an improved score on the FAAM to <50% functional impairment. 5. Independent with Home Exercise Programs MET 3/4/21     LTG: (to be met in 16 treatments)  1. Reduce pain levels to no greater than 4-5/10 with prolonged standing and walking while performing ADL's once performing WBAT. 2. Pt will demonstrate ability to ambulate x200 ft with least restrictive assistive device with minimal gait deficits once WBAT and weaned into comfortable sneaker. 3. Pt will increase R ankle strength to 4+/5 globally in order to improve tolerance to performing stairs in order to get down to his basement. 4. Pt will demonstrate improved functional activity tolerance as evident by an improved score on the FAAM to <30% functional impairment.                    Patient goals: \"get boot off, no crutches\"    Pt.  Education:  [x] Yes  [] No  [x] Reviewed Prior HEP/Ed  Method of Education: [x] Verbal  [] Demo  [] Written  Comprehension of Education:  [x] Verbalizes understanding. [] Demonstrates understanding. [] Needs review. [] Demonstrates/verbalizes HEP/Ed previously given. Plan: [] Continue per plan of care.    [x] Other: Patient C-9 Expires 4/5 and has Dr appt 4/6/21        Treatment Charges: Mins Units TIME   [x]  Modalities (CP) 10 0 401-411PM   [x]  Ther Exercise 30 2 330 -400 PM   []  Manual Therapy      []  Ther Activities      []  Aquatics      []  Neuromuscular      [] Vasocompression      [] Gait Training      [] Dry needling        [] 1 or 2 muscles        [] 3 or more muscles      []  Other      Total Treatment time 30 2      Time In: 330 PM      Time Out: 664 PM    Electronically signed by Antoinette Crisostomo PTA

## 2021-04-05 ENCOUNTER — HOSPITAL ENCOUNTER (OUTPATIENT)
Dept: PHYSICAL THERAPY | Age: 66
Setting detail: THERAPIES SERIES
Discharge: HOME | End: 2021-04-05
Payer: COMMERCIAL

## 2021-04-05 PROCEDURE — 97016 VASOPNEUMATIC DEVICE THERAPY: CPT

## 2021-04-05 PROCEDURE — 97110 THERAPEUTIC EXERCISES: CPT

## 2021-04-05 NOTE — PROGRESS NOTES
[address] [facility] Outpatient Physical Therapy              [facility]              Phone: [phone]              Fax: [phone]    Physical Therapy Progress Note    Date: 2021      Patient: Ken Powell  : 1955  MRN: [id number]    Physician: Dr. Paulette Lefort, MD                                       Insurance: Worker's Comp, 16 visits -  Diagnosis: M19.171 - Secondary localized osteoarthritis of right ankle and foot                Rehab Codes: M19.171, M25.571, M79.671, R26.2, M62.81             Onset Date: surgery 2020                                   Next Dr. Piña : 2021  Date range of services: 21 to 21    Subjective: Reports he has been trying to gradually ween from boot- states he has only been out of it 3 hrs today at home and always wears it when out and about; always uses cane. States foot pain increases as day goes on- ices and elevates at night. Also reports stretching at home daily. State pain is more in heel and achilles/calf than ankle. Pain:  [x]? Yes  []? No   Location: R ankle/foot; R heel, distal achilles into calf           Pain Rating: (0-10 scale)3/10  Pain altered Tx:  [x]? No  []? Yes  Action:   Comments:   Reassessed progress toward pt goals this date. Pt is improving in his R ankle strength globally, R ankle AROM, and ability to perform functional activities with R foot/ankle indicated by FAAM. Pt is currently ambulating with single point cane and has been weaning from CAM boot. Pt has been ambulating with comfortable shoe while at therapy and a limited amount at home. Pt continues to be limited by hip weakness, R ankle/foot pain, and R ankle AROM. R ankle/foot increases in pain throughout the day as pt completes daily activities. Pt cont to experience swelling in R foot. Good prognosis for recovery with continued PT 2x/week for 12 additional visits.   Pt has demonstrated good progression throughout [facility] progression and PT intervention. Pt recently complaining of increased soreness throughout the day as he has begun weaning himself out of CAM walking boot for extended periods of time at home. PT educated patient on slow progression while we continue to focus on improving ankle/foot ROM and strength and normalizing gait pattern. Pt requires new C9 approval in order to continue therapy past next 2 treatment sessions. Objective:  Test Measurements:  Reassessed by primary PT 4/5/21 ROM  ° A/P STRENGTH     Left Right Left Right   Hip Flex     4+ 5   Ext           ER           IR           ABD       4+   ADD           Knee Flex           Ext           Ankle PF 28 28 5 5   DF  (knee straight) 15 8 5 5   DF   (knee flexed) 20 8 5 --   Inv 42 5 5 4+   Ever 10 2 5 4+   1st MTP DF           1st MTP PF                         Function:   FAAM- 26% functionally impaired this date     Assessment:  STG: (to be met in 8 treatments) Goals addressed by evaluating PT  1. ? Pain: Decrease pain levels to < 2/10 with progression of strengthening and WBing exercises throughout therapy.  MET 3/4/21  2. ? ROM: Pt will demonstrate full R ankle ROM per fusion protocol in order to improve gait mechanics and tolerance to prolonged standing and walking. Ongoing 4/5/21- Improvements made in PF and DF, still limited compared to LLE  3. ? Strength: Pt will demonstrate increased R hip strength to 4+/5 globally in order to improve gait mechanics. MET 3/4/21  4. ? Function: Pt will demonstrate improved functional activity tolerance as evident by an improved score on the FAAM to <50% functional impairment. MET 4/5/21 26% functionally impaired this date  5. Independent with Home Exercise Programs MET 3/4/21     LTG: (to be met in 16 treatments)  1. Reduce pain levels to no greater than 4-5/10 with prolonged standing and walking while performing ADL's once performing WBAT.  Progress made 4/5/21- in the morning pt reports pain at 2/10 but increased up to 6/10 at night following activity. 2. Pt will demonstrate ability to ambulate x200 ft with least restrictive assistive device with minimal gait deficits once WBAT and weaned into comfortable sneaker. Progress made 4/5/21 amb with single point cane in comfortable shoe cont gait deficits present and limited tolerance to ambulation d/t pain  3. Pt will increase R ankle strength to 4+/5 globally in order to improve tolerance to performing stairs in order to get down to his basement. MET 4/5/21  4. Pt will demonstrate improved functional activity tolerance as evident by an improved score on the FAAM to <30% functional impairment. MET 4/5/21 26% functionally impaired this date       Treatment Plan:  [x] Therapeutic Exercise   27375  [] Iontophoresis: 4 mg/mL Dexamethasone Sodium Phosphate  mAmin  38787   [] Therapeutic Activity  31416 [x] Vasopneumatic cold with compression  67330    [x] Gait Training   85764 [] Ultrasound   95678   [] Neuromuscular Re-education  26169 [] Electrical Stimulation Unattended  70151   [x] Manual Therapy  37656 [] Electrical Stimulation Attended  35908   [x] Instruction in HEP  [] Lumbar/Cervical Traction  56892   [] Aquatic Therapy   07046 [] Cold/hotpack    [] Massage   08290      [] Dry Needling, 1 or 2 muscles  99377   [] Biofeedback, first 15 minutes   29695  [] Biofeedback, additional 15 minutes   02798 [] Dry Needling, 3 or more muscles  45849       Patient Status:     [] Continue per initial plan of care. [x] Additional visits necessary. [x] Other: new C9 approval required     Requested Frequency/Duration: 2 times per week for 12 treatments. [for 28 total treatments]        Electronically signed by Michael CARMONA on 4/5/2021 This documentation has been reviewed and entirety of treatment session with direct supervision by clinical instructor, Bronwyn Batres PT, DPT    If you have any questions or concerns, please don't hesitate to call.   Thank you for your referral.    Physician Signature:________________________________Date:__________________  By signing above or cosigning this note, I have reviewed this plan of care and certify a need for medically necessary rehabilitation services.      *PLEASE SIGN ABOVE AND FAX BACK ALL PAGES*

## 2021-04-05 NOTE — FLOWSHEET NOTE
[facility] Outpatient Physical Therapy   [address]   Phone: [phone]   Fax: [phone]    Physical Therapy Daily Treatment Note      Date:  2021  Patient Name:  Caryl Morrow    :  1955  MRN: [id number]  Physician: Dr. Sebas Gu MD       Insurance: Worker's Comp, 16 visits -  Diagnosis: M19.171 - Secondary localized osteoarthritis of right ankle and foot                Rehab Codes: M19.171, M25.571, M79.671, R26.2, M62.81   Onset Date: surgery 2020       Next Dr. Marielos Morris: 2021  Visit# / total visits:  Cancels/No Shows: 0/0    Subjective: Reports he has been trying to gradually ween from boot- states he has only been out of it 3 hrs today at home and always wears it when out and about; always uses cane. States foot pain increases as day goes on- ices and elevates at night. Also reports stretching at home daily. State pain is more in heel and achilles/calf than ankle. Pain:  [] Yes  [x] No Location: R ankle/foot; R heel, distal achilles into calf Pain Rating: (0-10 scale)3/10  Pain altered Tx:  [x] No  [] Yes  Action:   Comments: Patient arrives to PT in CAM boot and straight cane; left shoe for R foot in car- requested patient return to car to get it for use in PT.         Objective:   Modalities: Vaso low pressure 34* 15' R ankle 2021  Precautions: (in CAM boot when ambulating) 50% Partial Wb'ing x 2 weeks (-), then 75% Partial Wb'ing x 2 weeks (-3/5), Full WB WITH CAM boot beginning 3/6/21    Exercises:  Exercises: Exercise WB this date performed in (B) Tennis shoes  Exercise       Reps/ Time Weight/ Level Comments Completed 2021      Sidelying hip abd RLE only 10x2         Supine DF + Inv 5x45\"    strap     Active ankle pumps 2x10    HEP     Supine HS stretch 3x 30\"    HEP     Weight shifts (AP, ML) 10x ea    x   Transfers from sit<>stand 10x2 Use of analog scale (75% WB)  **Inc to 75% WB per doctor     Standing Marching/Leg Curls 10x each      Foot Doming 10x with 3\"   x   Standing 3 way hip (B) 10x1   Light UE Support x   SB calf stretch  30\"x3     x   amb in //bars  5x Fwd/retro/lateral      Cone step overs  2x  Fwd/lateral       Ascending/descending stairs 1x full flight of stairs  ascending with hand rail on R side and descending with rail on L side.  With St cane and one UE support to mimic stairs at home    Seated BAPS  10x2  L2  A/P    Seated Marching 10x2 3#      Seated LAQ 20x2 3#       Seated HS curl 10x2 Blue TB       resisted DF/PF 10x2 LIME       Mini squats  10x2     x   Resisted amb free motion 4x 20#  fwd/retro/side stepping    Gait without LBQC 2 laps around gym        Step Downs Add      Step ups fwd/lat WB R LE 15x each 6\"     Gait with Straight Cane In Shoe         Other:  HEP: Ankle pumps, HS stretch, SLR, Standing 3 way hip       Specific Instructions for next treatment: Progress with WBAT without Cam boot; Restore R ankle AROM and progress hip strengthening per protocol       Assessment: [x] Progressing toward goals. [] No change. [] Other:    [x] Patient would continue to benefit from skilled physical therapy services in order to: improve ROM and strength, normalize gait pattern and decrease pain in order to return to performing independent ADL's.     4/5/2021: Pt was seated in lobby with shoe on affected side and had CAM boot ready. Pt states he amb into clinic with both shoes on with straight cane. Pt ed on properly weening from CAM boot. Swelling noted around ankle and no pain with palpation at heel. Added foot doming this session to inc strength in intrinsic foot muscles.         STG: (to be met in 8 treatments) Goals addressed by evaluating PT  1. ? Pain: Decrease pain levels to < 2/10 with progression of strengthening and WBing exercises throughout therapy.  MET 3/4/21  2. ? ROM: Pt will demonstrate full R ankle ROM per fusion protocol in order to improve gait mechanics and Activities      []  Aquatics      []  Neuromuscular      [x] Vasocompression 15 1 [id number]   [] Gait Training      [] Dry needling        [] 1 or 2 muscles        [] 3 or more muscles      [x]  Other- re-eval from [facility] 10 - [phone]   Total Treatment time 45 3      Time In: 7702 OUT: 1110    Electronically signed by Mirtha Linn PTA

## 2021-04-06 ENCOUNTER — OFFICE VISIT (OUTPATIENT)
Dept: ORTHOPEDIC SURGERY | Age: 66
End: 2021-04-06
Payer: COMMERCIAL

## 2021-04-06 VITALS — BODY MASS INDEX: 40.73 KG/M2 | TEMPERATURE: 97.9 F | HEIGHT: 69 IN | RESPIRATION RATE: 12 BRPM | WEIGHT: 275 LBS

## 2021-04-06 DIAGNOSIS — Z91.199 NONCOMPLIANCE: ICD-10-CM

## 2021-04-06 DIAGNOSIS — M19.079 ARTHRITIS OF SUBTALAR JOINT: Primary | ICD-10-CM

## 2021-04-06 DIAGNOSIS — M19.271 SECONDARY LOCALIZED OSTEOARTHROSIS OF RIGHT ANKLE AND FOOT: ICD-10-CM

## 2021-04-06 PROCEDURE — 99213 OFFICE O/P EST LOW 20 MIN: CPT | Performed by: ORTHOPAEDIC SURGERY

## 2021-04-06 NOTE — PROGRESS NOTES
[facility]  [place] [address]  Dept: [phone]    Ambulatory Orthopedic Follow Up Visit    Preoperative Diagnosis:   1. 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 Right ankle equinus contracture  3. CAD with h/o Afib s/p ablation + pacemaker  4. Diabetes  5. H/o tobacco use  6. Body mass index is 40.61 kg/m².     Postoperative Diagnosis:   1. Same as above     Procedures Performed:  (11/12/2020)  1. Right foot subtalar fusion   2. Right percutaneous tendoachilles lengthening, performed through separate incision       CHIEF COMPLAINT:    Chief Complaint   Patient presents with    Foot Pain     Right Foot         HISTORY OF PRESENT ILLNESS:       He is a 77 y.o. male, seen again today in the office for follow up of the above problem with a history of pain at the above location. Since being seen last, the patient is doing better. At today's visit, he is using no brace or assistive device. He does report some plantar heel pain worse with ambulation, but denies any lateral hindfoot pain. REVIEW OF SYSTEMS:   Constitutional: Negative for fever. HENT: Negative for tinnitus. Eyes: Negative for pain. Respiratory: Negative for shortness of breath. Cardiovascular: Negative for chest pain. Gastrointestinal: Negative for abdominal pain. Genitourinary: Negative for dysuria. Skin: Negative for rash. Neurological: Negative for headaches. Hematological: Does not bruise/bleed easily.    Musculoskeletal: See HPI for pertinent positives     Past Medical History:    He  has a past medical history of Allergic rhinitis, Anxiety, Arrhythmia, Arthritis, Atrial fibrillation ([address] Benign hypertension (5/26/2015), Blind right eye, Caffeine use, Cough, Depression, Diabetes mellitus ([address] GERD (gastroesophageal reflux disease), Glaucoma, Hyperlipidemia LDL goal < 100 (5/26/2015), Hypertrophy of prostate with urinary obstruction and other lower urinary tract symptoms (LUTS), Morbid obesity ([address] (5/26/2015), Mumps, Unspecified sleep apnea, and Wears glasses. Past Surgical History:    He  has a past surgical history that includes Vasectomy; Cystocopy; Cardioversion (2012 ?); Colonoscopy; Elbow surgery (Right, 1967); Total knee arthroplasty (Right, 2015); Corneal transplant (Right, multiple); Foot surgery (Right, 07/14/2016); Ankle arthroscopy (Right, 02/23/2017); Ankle arthroscopy (Right, 2/23/2017); pacemaker placement (12/23/2016); and Ankle surgery (Right, 11/12/2020).      Current Medications:     Current Outpatient Medications:     pantoprazole (PROTONIX) 40 MG tablet, TAKE 1 TABLET DAILY, Disp: 90 tablet, Rfl: 3    atorvastatin (LIPITOR) 80 MG tablet, Take 1 tablet by mouth daily, Disp: 90 tablet, Rfl: 3    Alcohol Swabs (ALCOHOL PREP) 70 % PADS, Check glucose AC BID, dx E11.9, may sub covered product, Disp: 200 each, Rfl: 11    blood glucose test strips (ASCENSIA AUTODISC VI;ONE TOUCH ULTRA TEST VI) strip, Check glucose AC BID, dx E11.9, may sub covered product, Disp: 200 strip, Rfl: 11    Lancets MISC, 1 each by Does not apply route daily, Disp: 200 each, Rfl: 5    Lancet Devices (LANCING DEVICE) MISC, Check glucose AC BID, dx E11.9, may sub covered product, Disp: 1 each, Rfl: 0    Blood Glucose Monitoring Suppl (ONE TOUCH ULTRA 2) w/Device KIT, Check glucose AC BID, dx E11.9, may sub covered product, Disp: 1 kit, Rfl: 0    blood glucose test strips (ACCU-CHEK GUIDE) strip, CHECK TWO TIMES A DAY., Disp: 200 strip, Rfl: 3    metoprolol succinate (TOPROL XL) 25 MG extended release tablet, Take 1 tablet by mouth daily, Disp: 90 tablet, Rfl: 3    hydroCHLOROthiazide (HYDRODIURIL) 25 MG tablet, TAKE 1 TABLET DAILY, Disp: 90 tablet, Rfl: 3    SITagliptin-metFORMIN (JANUMET XR)  MG TB24 per extended release tablet, Take Resp 12   Ht 5' 9\" (1.753 m)   Wt 275 lb (124.7 kg)   BMI 40.61 kg/m²    Psych: alert and oriented to person, time, and place  Cardio:  well perfused extremities  Resp:  normal respiratory effort  Skin:  no cyanosis  Hem/lymph:  no lymphedema  Neuro:  sensation to light touch unchanged since last visit  Musculoskeletal:    Incisions clean/dry/intact, no erythema/dehiscence/drainage  Sensation to light touch grossly intact throughout   Warm and well perfused  Grossly neurovascularly intact distally  No signs of infection  -No gross motion of the subtalar joint, painless ankle range of motion but decreased  -No tenderness over sinus Tarsi   -Tenderness at the plantar heel  -Negative squeeze test of the calcaneus for stress fracture      RADIOLOGY:   4/6/2021 FINDINGS:  Three weightbearing views (AP, Oblique, Lateral) of the right foot were obtained in the office today and reviewed, revealing no acute fracture, dislocation, or radioopaque foreign body/tumor. Intact hardware status post subtalar fusion without evidence of loosening. No interval displacement.      IMPRESSION: Status post subtalar fusion as above.     Electronically signed by Daniella Coker MD      ASSESSMENT AND PLAN:  Body mass index is 40.61 kg/m². He is status post the above (on 11/12/2020), doing well overall, but with some questionably delayed radiographic healing as well as some relatively new plantar heel pain. His postoperative course has been complicated by not strictly adhering to the weightbearing restrictions.     He has a history of right severe subtalar joint arthritis (status post attempted subtalar fusion with subsequent removal of hardware, with nonunion of his subtalar joint), along with ipsilateral ankle arthritis with anterior impingement, as sequelae of a fall from a ladder in 2005 while at work.  Mary Bound a somewhat complex past medical history including but not limited to the following:  Atrial fibrillation (status post ablation plus pacemaker; not taking a blood thinner), KIM, obesity (BMI greater than 40), non-insulin-dependent diabetes, and tobacco use (reports he smokes 1 cigar/day).   His most recent hemoglobin A1c above. We had a discussion today about the likely diagnosis and its natural history, physical exam and imaging findings, as well as various treatment options in detail. Surgically, we discussed a possible revision subtalar fusion with bone grafting (distal tibia plus PDGF). At this point, the patient does wish to wait for the time being and continue to observe (he reports he is going to [place] in the beginning of May). We discussed that at his next visit in 8 weeks, depending on how he is doing, we may consider a CT scan if we are heading towards a possible revision surgery scenario. We have also previously discussed a possible future total ankle replacement. Orders/referrals were placed as below at today's visit. The patient was provided heel cups today for his plantar heel pain. The patient was also referred again to physical therapy for general strengthening, range of motion, and gait training, and he will wean out of the cam boot (we did discuss that ideally he should not have been out of the cam boot for so long, as he reports he has not been using it for many weeks, but at this point we discussed that he will continue to progress at this level of activity). He will continue to use his bone stimulator daily, and we again discussed where to place this. He reports he has not been smoking. All questions were answered and the above plan was agreed upon. The patient will return to clinic in 8 weeks with repeat right foot and ankle x-rays. At his next visit, depending on how he is doing, we may consider a CT scan with possible subsequent surgery as above.           At the patient's next visit, depending on how the patient is doing and/or new imaging/labs results, we may consider the following options:    []  Orthotic (OTC)     []  Orthotic (custom)          []  Rocker bottom shoes     []  Brace (OTC)        []  Brace (custom)             []  CAM boot        []  Night splint         []  Heel cups        []  Strap      []  Toe sleeves/splints    []  PT:                     []  Wean out of immobilization   []  Advance activity       []  Topical               []  NSAIDs          []  Margarita         []  Referral:         []  Stress xrays       []  CT         []  MRI        []  Injection:         []  Consider OR      []  Pick OR date    Return in about 8 weeks (around 6/1/2021). No orders of the defined types were placed in this encounter. No orders of the defined types were placed in this encounter. Soni Woodard MD  Orthopedic Surgery        Please excuse any typos/errors, as this note was created with the assistance of voice recognition software. While intending to generate a document that actually reflects the content of the visit, the document can still have some errors including those of syntax and sound-a-like substitutions which may escape proof reading. In such instances, actual meaning can be extrapolated by context.

## 2021-04-09 ENCOUNTER — HOSPITAL ENCOUNTER (OUTPATIENT)
Dept: PHYSICAL THERAPY | Age: 66
Setting detail: THERAPIES SERIES
Discharge: HOME | End: 2021-04-09
Payer: COMMERCIAL

## 2021-04-09 PROCEDURE — 97110 THERAPEUTIC EXERCISES: CPT

## 2021-04-09 NOTE — FLOWSHEET NOTE
with hand rail on R side and descending with rail on L side.  With St cane and one UE support to mimic stairs at home    Seated BAPS  10x2  L2  A/P med/lat x   Seated Marching 10x2 3#      Seated LAQ 20x2 3#       Seated HS curl 10x2 Blue TB       resisted DF/PF 10x2 [employer] to blue 4/9  x   Mini squats  10x2        Resisted amb free motion 4x 20#  fwd/retro/side stepping    Gait without LBQC 2 laps around gym        Step Downs 15x  Added 4/9 x   Step ups fwd/lat WB R LE 15x each 6\"  x   Gait with Straight Cane In Shoe         Heel raises 20x   x   Heel taps 15x2   x   Ankle alphabet 1x   x   Other:  HEP: Ankle pumps, HS stretch, SLR, Standing 3 way hip       Specific Instructions for next treatment: Progress with WBAT without Cam boot; Restore R ankle AROM and progress hip strengthening per protocol       Assessment: [x] Progressing toward goals. [] No change. [] Other:    [x] Patient would continue to benefit from skilled physical therapy services in order to: improve ROM and strength, normalize gait pattern and decrease pain in order to return to performing independent ADL's.     4/9/2021: Pt had tennis shoes this session and straight cane. Pt c/o pain in achilles with heel raises. Added step downs: UE support needed to help control descend. Pt was heavily educated on use of CAM boot if he has any inc in pain and to perform ankle strengthening HEP consistently to inc strength in ankle and foot to improve stability. Pt continues to demo limited ROM in inversion/eversion but still attempts to improve ROM. Provided pt with blue TB and printed HEP this session.     STG: (to be met in 8 treatments) Goals addressed by evaluating PT  1. ? Pain: Decrease pain levels to < 2/10 with progression of strengthening and WBing exercises throughout therapy.  MET 3/4/21  2. ? ROM: Pt will demonstrate full R ankle ROM per fusion protocol in order to improve gait mechanics and tolerance to prolonged standing and walking. Ongoing 4/5/21- Improvements made in PF and DF, still limited compared to LLE  3. ? Strength: Pt will demonstrate increased R hip strength to 4+/5 globally in order to improve gait mechanics. MET 3/4/21  4. ? Function: Pt will demonstrate improved functional activity tolerance as evident by an improved score on the FAAM to <50% functional impairment. MET 4/5/21 26% functionally impaired this date  5. Independent with Home Exercise Programs MET 3/4/21     LTG: (to be met in 16 treatments)  1. Reduce pain levels to no greater than 4-5/10 with prolonged standing and walking while performing ADL's once performing WBAT. Progress made 4/5/21- in the morning pt reports pain at 2/10 but increased up to 6/10 at night following activity. 2. Pt will demonstrate ability to ambulate x200 ft with least restrictive assistive device with minimal gait deficits once WBAT and weaned into comfortable sneaker. Progress made 4/5/21 amb with single point cane in comfortable shoe cont gait deficits present and limited tolerance to ambulation d/t pain  3. Pt will increase R ankle strength to 4+/5 globally in order to improve tolerance to performing stairs in order to get down to his basement. MET 4/5/21  4. Pt will demonstrate improved functional activity tolerance as evident by an improved score on the FAAM to <30% functional impairment. MET 4/5/21 26% functionally impaired this date     Pt. Education:  [x] Yes  [] No  [x] Reviewed Prior HEP/Ed  Method of Education: [x] Verbal  [] Demo  [] Written  Comprehension of Education:  [x] Verbalizes understanding. [] Demonstrates understanding. [] Needs review. [] Demonstrates/verbalizes HEP/Ed previously given. Plan: [] Continue per plan of care.    [x] Other:         Treatment Charges: Mins Units TIME   []  Modalities (CP)      [x]  Ther Exercise 45 6 1610-0466   []  Manual Therapy      []  Ther Activities      []  Aquatics      []  Neuromuscular      [] Vasocompression      [] Gait Training      [] Dry needling        [] 1 or 2 muscles        [] 3 or more muscles      []  Other- re-eval from SPT      Total Treatment time 45 3      Time In: 0845 OUT: 0930    Electronically signed by Cleopatra Porras PTA

## 2021-04-12 ENCOUNTER — HOSPITAL ENCOUNTER (OUTPATIENT)
Dept: PHYSICAL THERAPY | Age: 66
Setting detail: THERAPIES SERIES
Discharge: HOME | End: 2021-04-12
Payer: COMMERCIAL

## 2021-04-12 PROCEDURE — 97110 THERAPEUTIC EXERCISES: CPT

## 2021-04-12 NOTE — FLOWSHEET NOTE
[facility]   [address]   Phone: [phone]   Fax: [phone]    Physical Therapy Daily Treatment Note      Date:  2021  Patient Name:  Margareth Haines    :  1955  MRN: [id number]  Physician: Dr. Nathalia Roca MD       Insurance: Worker's Comp, 16 vs through 21, presumptive auth for additional visits  Diagnosis: M19.171 - Secondary localized osteoarthritis of right ankle and foot                Rehab Codes: M19.171, M25.571, M79.671, R26.2, M62.81   Onset Date: surgery 2020       Next Dr. Brown Distance: 2021  Visit# / total visits:  Cancels/No Shows: 0/0    Subjective: Pt was checked in late this date. Arrives with min pain located in R heel and achilles. Pt forgot AD this date and mentions he rarely uses CAM boot. Pain:  []? Yes  [x]? No   Location: R ankle/foot; R heel, distal achilles into calf           Pain Rating: (0-10 scale)2/10  Pain altered Tx:  [x]? No  []?  Yes  Action:   Comments:         Objective:   Modalities: Vaso low pressure 34* 15' R ankle 2021- held  Precautions: (in CAM boot when ambulating) 50% Partial Wb'ing x 2 weeks (-), then 75% Partial Wb'ing x 2 weeks (-3/5), Full WB WITH CAM boot beginning 3/6/21    Exercises:  Exercises: Exercise WB this date performed in (B) Tennis shoes  Exercise       Reps/ Time Weight/ Level Comments Completed 2021      Sidelying hip abd RLE only 10x2      x   Supine DF + Inv 5x45\"    strap  x   Active ankle pumps 2x10    HEP     Supine HS stretch 3x 30\"    HEP     Weight shifts (AP, ML) 10x ea      Transfers from sit<>stand 10x2 Use of analog scale (75% WB)  **Inc to 75% WB per doctor    Standing Marching/Leg Curls 10x each      Foot Doming 10x with 3\"   x   Standing 3 way hip (B) 10x1   Light UE Support  HEP    SB calf stretch  30\"x3        amb in //bars  5x Fwd/retro/lateral      Cone step overs  2x  Fwd/lateral       Ascending/descending stairs 4/5/21- Improvements made in PF and DF, still limited compared to LLE  3. ? Strength: Pt will demonstrate increased R hip strength to 4+/5 globally in order to improve gait mechanics. MET 3/4/21  4. ? Function: Pt will demonstrate improved functional activity tolerance as evident by an improved score on the FAAM to <50% functional impairment. MET 4/5/21 26% functionally impaired this date  5. Independent with Home Exercise Programs MET 3/4/21     LTG: (to be met in 16 treatments)  1. Reduce pain levels to no greater than 4-5/10 with prolonged standing and walking while performing ADL's once performing WBAT. Progress made 4/5/21- in the morning pt reports pain at 2/10 but increased up to 6/10 at night following activity. 2. Pt will demonstrate ability to ambulate x200 ft with least restrictive assistive device with minimal gait deficits once WBAT and weaned into comfortable sneaker. Progress made 4/5/21 amb with single point cane in comfortable shoe cont gait deficits present and limited tolerance to ambulation d/t pain  3. Pt will increase R ankle strength to 4+/5 globally in order to improve tolerance to performing stairs in order to get down to his basement. MET 4/5/21  4. Pt will demonstrate improved functional activity tolerance as evident by an improved score on the FAAM to <30% functional impairment. MET 4/5/21 26% functionally impaired this date     Pt. Education:  [x] Yes  [] No  [x] Reviewed Prior HEP/Ed  Method of Education: [x] Verbal  [x] Demo  [] Written  Comprehension of Education:  [x] Verbalizes understanding. [] Demonstrates understanding. [] Needs review. [] Demonstrates/verbalizes HEP/Ed previously given. Plan: [] Continue per plan of care.    [x] Other:         Treatment Charges: Mins Units TIME   []  Modalities (CP)      [x]  Ther Exercise 33 6 4216-2562   []  Manual Therapy      []  Ther Activities      []  Aquatics      []  Neuromuscular      [] Vasocompression      [] Gait Training [] Dry needling        [] 1 or 2 muscles        [] 3 or more muscles      []  Other- re-eval from SPT      Total Treatment time 33 2 [id number]     Time In: 9:47 am OUT: 10:20 am    Electronically signed by Magalie Odonnell PTA

## 2021-04-15 ENCOUNTER — HOSPITAL ENCOUNTER (OUTPATIENT)
Dept: PHYSICAL THERAPY | Age: 66
Setting detail: THERAPIES SERIES
Discharge: HOME | End: 2021-04-15
Payer: COMMERCIAL

## 2021-04-15 NOTE — FLOWSHEET NOTE
[] [facility]) - [facility]  Therapy  [address]    [phone]  F: [phone]   [] [address]  [facility]   [address]  P: [phone]  F: [phone]  [] [address]  [address]  P: [phone]  F: [phone] [] [address]  P: [phone]  F: [phone]  [] [address] [facility]   [address]   [place]: [phone]  F: [phone]   [] [place] [address] [facility] [address]  [place]: [phone]   F: [phone]     Physical Therapy Cancel/No Show note    Date: 4/15/2021  Patient: Iain Dia  : 1955  MRN: [id number]    Cancels/No Shows to date:     For today's appointment patient:    [x]  Cancelled - provider cancel, not counted against patient    [] Rescheduled appointment    [] No-show     Reason given by patient:    []  Patient ill    []  Conflicting appointment    [] No transportation      [] Conflict with work    [] No reason given    [] Weather related    [] [id number]    [x] Other:      Comments: waiting for new C9 for additional visits       [x] Next appointment was confirmed - pt on schedule for 21 will call patient if we do not receive auth by then    Electronically signed by: Houston Farley, PT

## 2021-04-23 ENCOUNTER — HOSPITAL ENCOUNTER (OUTPATIENT)
Dept: PHYSICAL THERAPY | Age: 66
Setting detail: THERAPIES SERIES
Discharge: HOME | End: 2021-04-23
Payer: COMMERCIAL

## 2021-04-23 PROCEDURE — 97110 THERAPEUTIC EXERCISES: CPT

## 2021-04-23 PROCEDURE — 97112 NEUROMUSCULAR REEDUCATION: CPT

## 2021-04-23 NOTE — FLOWSHEET NOTE
[address] [place] [address] Outpatient Physical Therapy   [id number] [facility] [address]   Phone: [phone]   Fax: [phone]    Physical Therapy Daily Treatment Note      Date:  2021  Patient Name:  Tressa Arredondo    :  1955  MRN: [id number]  Physician: Dr. Maria Dolores Elaine MD       Insurance: Worker's Comp, 16 vs through 21, presumptive auth for additional visits  Diagnosis: M19.171 - Secondary localized osteoarthritis of right ankle and foot                Rehab Codes: M19.171, M25.571, M79.671, R26.2, M62.81   Onset Date: surgery 2020       Next Dr. Damien Oppenheim: 2021  Visit# / total visits:  Cancels/No Shows: 0/0    Subjective: Pt reports difficulty with uneven surfaces. Pain:  []? Yes  [x]? No   Location: R ankle/foot; R heel, distal achilles into calf           Pain Rating: (0-10 scale) 1/10  Pain altered Tx:  [x]? No  []?  Yes  Action:   Comments:         Objective:   Modalities: Vaso low pressure 34* 15' R ankle 2021- held  Precautions: (in CAM boot when ambulating) 50% Partial Wb'ing x 2 weeks (-), then 75% Partial Wb'ing x 2 weeks (-3/5), Full WB WITH CAM boot beginning 3/6/21    Exercises:  Exercises: Exercise WB this date performed in (B) Tennis shoes  Exercise       Reps/ Time Weight/ Level Comments Completed 2021      Sidelying hip abd RLE only 10x2      x   Supine DF + Inv 5x45\"    strap  x   Active ankle pumps 2x10    HEP  HEP   Supine HS stretch 3x 30\"    HEP     Weight shifts (AP, ML) 10x ea      Transfers from sit<>stand 10x2 Use of analog scale (75% WB)  **Inc to 75% WB per doctor    Obstacle course   Stepping ovr hurdles and on airdex foam    NuStep 8' lvl 4  x   SLS on foam 3x30\"  Add  x   Tandem stance on foam 3x 30\"  Add  x   Standing Marching/Leg Curls 10x each   marching on foam x   Foot Doming 10x with 3\"   HEP   Standing 3 way hip (B) 10x1   Light UE Support  HEP x   SB calf stretch  30\"x3     x   amb in //bars  5x Fwd/retro/lateral      Cone step overs  2x  Fwd/lateral       Ascending/descending stairs 1x full flight of stairs  ascending with hand rail on R side and descending with rail on L side.  With St cane and one UE support to mimic stairs at home    Seated BAPS  10x2  L2  A/P med/lat HEP   Seated Marching 10x2 3#   HEP   Seated LAQ 10x2 3#   HEP   Seated HS curl 10x2 Blue TB   HEP   resisted DF/PF 10x2 [employer] to blue 4/9 HEP   Mini squats  10x2        Resisted amb free motion 4x 20#  fwd/retro/side stepping    Retro amb in // bars 5x      Gait without LBQC 2 laps around gym        Step Downs 15x  Added 4/9; UE A    Step ups fwd/lat WB R LE 15x each 8'\" 4/23 inc to 8\" x   Gait with Straight Cane In Shoe 1 lap   no AD this date    Heel raises 20x      Heel taps 15x2      Ankle alphabet 1x   HEP   Other:  HEP: Ankle pumps, HS stretch, SLR, Standing 3 way hip       Specific Instructions for next treatment: Progress with WBAT without Cam boot; Restore R ankle AROM and progress hip strengthening per protocol       Assessment: [x] Progressing toward goals. [] No change. [] Other:    [x] Patient would continue to benefit from skilled physical therapy services in order to: improve ROM and strength, normalize gait pattern and decrease pain in order to return to performing independent ADL's.      4/23/2021: Pt states he feels most discomfort/pain in R heel when in SLS/WB on RLE. Added tandem and SL stance on foam this session and obstacle course to improve ankle stability and balance this session. CGA throughout obstacle course for safety; pt had no significant LOB but was lacking confidence when completing obstacle course.   Pt had mild pain in R heel from WB and standing for an extended amount of time; symptoms subsiding with rest.      STG: (to be met in 8 treatments) Goals addressed by evaluating PT  1. ? Pain: Decrease pain levels to < 2/10 with progression of strengthening and WBing exercises throughout therapy.  MET 3/4/21  2. ? ROM: Pt will demonstrate full R ankle ROM per fusion protocol in order to improve gait mechanics and tolerance to prolonged standing and walking. Ongoing 4/5/21- Improvements made in PF and DF, still limited compared to LLE  3. ? Strength: Pt will demonstrate increased R hip strength to 4+/5 globally in order to improve gait mechanics. MET 3/4/21  4. ? Function: Pt will demonstrate improved functional activity tolerance as evident by an improved score on the FAAM to <50% functional impairment. MET 4/5/21 26% functionally impaired this date  5. Independent with Home Exercise Programs MET 3/4/21     LTG: (to be met in 16 treatments)  1. Reduce pain levels to no greater than 4-5/10 with prolonged standing and walking while performing ADL's once performing WBAT. Progress made 4/5/21- in the morning pt reports pain at 2/10 but increased up to 6/10 at night following activity. 2. Pt will demonstrate ability to ambulate x200 ft with least restrictive assistive device with minimal gait deficits once WBAT and weaned into comfortable sneaker. Progress made 4/5/21 amb with single point cane in comfortable shoe cont gait deficits present and limited tolerance to ambulation d/t pain  3. Pt will increase R ankle strength to 4+/5 globally in order to improve tolerance to performing stairs in order to get down to his basement. MET 4/5/21  4. Pt will demonstrate improved functional activity tolerance as evident by an improved score on the FAAM to <30% functional impairment. MET 4/5/21 26% functionally impaired this date     Pt. Education:  [x] Yes  [] No  [x] Reviewed Prior HEP/Ed  Method of Education: [x] Verbal  [x] Demo  [] Written  Comprehension of Education:  [x] Verbalizes understanding. [] Demonstrates understanding. [] Needs review. [] Demonstrates/verbalizes HEP/Ed previously given.     Carlos Del Cid: [] Continue per plan of care.    [x] Other:         Treatment Charges: Mins Units TIME   [] Modalities (CP)      [x]  Ther Exercise 35 2 2255-6378   []  Manual Therapy      []  Ther Activities      []  Aquatics      [x]  Neuromuscular 10 6 0125-9853   [] Vasocompression      [] Gait Training      [] Dry needling        [] 1 or 2 muscles        [] 3 or more muscles      []  Other- re-eval from SPT      Total Treatment time 45 3 7240-8173     Time In: 1000 OUT: 9020    Electronically signed by Yordy Jimenes PTA

## 2021-04-29 ENCOUNTER — HOSPITAL ENCOUNTER (OUTPATIENT)
Dept: PHYSICAL THERAPY | Age: 66
Setting detail: THERAPIES SERIES
Discharge: HOME | End: 2021-04-29
Payer: COMMERCIAL

## 2021-04-29 PROCEDURE — 97112 NEUROMUSCULAR REEDUCATION: CPT

## 2021-04-29 PROCEDURE — 97110 THERAPEUTIC EXERCISES: CPT

## 2021-04-29 NOTE — FLOWSHEET NOTE
[address] [facility] Outpatient Physical Therapy   [address]   Phone: [phone]   Fax: [phone]    Physical Therapy Daily Treatment Note      Date:  2021  Patient Name:  Mahsa Abebe    :  1955  MRN: [id number]  Physician: Dr. Jenni Saini MD       Insurance: Worker's Comp, 16 vs through 21, presumptive auth for additional visits  Diagnosis: M19.171 - Secondary localized osteoarthritis of right ankle and foot                Rehab Codes: M19.171, M25.571, M79.671, R26.2, M62.81   Onset Date: surgery 2020       Next Dr. Carlitos Pinto: 2021  Visit# / total visits:  Cancels/No Shows: 0/0    Subjective: Pt reports today feeling well overall. Patient denies any pain today. Pain:  []? Yes  [x]? No   Location: R ankle/foot; R heel, distal achilles into calf           Pain Rating: (0-10 scale) 0/10  Pain altered Tx:  [x]? No  []?  Yes  Action:   Comments:         Objective:   Modalities: Vaso low pressure 34* 15' R ankle 2021- held  Precautions: (in CAM boot when ambulating) 50% Partial Wb'ing x 2 weeks (-), then 75% Partial Wb'ing x 2 weeks (-3/5), Full WB WITH CAM boot beginning 3/6/21    Exercises:  Exercises: Exercise WB this date performed in (B) Tennis shoes  Exercise       Reps/ Time Weight/ Level Comments Completed 2021      Sidelying hip abd RLE only 10x2         Supine DF + Inv 5x45\"    strap  x   Active ankle pumps 2x10    HEP  HEP   Supine HS stretch 3x 30\"    HEP     Weight shifts (AP, ML) 10x ea      Transfers from sit<>stand 10x2 Use of analog scale (75% WB)  **Inc to 75% WB per doctor    Obstacle course 2 laps Forward/Lateral Stepping over hurdles and on airdex foam x   NuStep 5' lvl 4  x   SLS on foam 3x30\"  Add  x   Tandem stance on foam 3x 30\"  Add  x   Standing Marching/Leg Curls 15x each   marching on foam x   Foot Doming 10x with 3\"   HEP   Standing 3 way hip (B) 10x1   Light UE Support  HEP x   SB calf stretch  30\"x3     x   amb in //bars  5x Fwd/retro/lateral      [facility]one step overs  2x  Fwd/lateral       Ascending/descending stairs 1x full flight of stairs  ascending with hand rail on R side and descending with rail on L side.  With St cane and one UE support to mimic stairs at home    Seated BAPS  10x2  L2  A/P med/lat HEP   Seated Marching 10x2 3#   HEP   Seated LAQ 10x2 3#   HEP   Seated HS curl 10x2 Blue TB   HEP   resisted DF/PF 10x2 [employer] to blue 4/9 HEP   Mini squats  10x2        Resisted amb free motion 4x 20#  fwd/retro/side stepping    Retro amb in // bars 5x      Gait without LBQC 2 laps around gym        Step Downs 15x  Added 4/9; UE A    Step ups fwd/lat WB R LE 15x each 8'\" 4/23 inc to 8\" x   Gait with Straight Cane In Shoe 1 lap   no AD this date    Heel raises 20x   x   Heel taps 15x2   x   Ankle alphabet 1x   HEP   Other:  HEP: Ankle pumps, HS stretch, SLR, Standing 3 way hip       Specific Instructions for next treatment: Progress with WBAT without Cam boot; Restore R ankle AROM and progress hip strengthening per protocol       Assessment: [x] Progressing toward goals. Continued exercises per chart to improve R ankle strength and neuromuscular control. Attempted heel raises on foam to challenge balance, patient demonstrated significant decrease in available motion and continued exercise on ground. Focused hurdles on avoiding hip circumduction. Patient reported slight increase in R knee pain post exercises with no change in ankle. [] No change. [] Other:    [x] Patient would continue to benefit from skilled physical therapy services in order to: improve ROM and strength, normalize gait pattern and decrease pain in order to return to performing independent ADL's.           STG: (to be met in 8 treatments) Goals addressed by evaluating PT  1. ? Pain: Decrease pain levels to < 2/10 with progression of strengthening and WBing exercises throughout therapy.  MET 3/4/21  2. ? ROM: Pt will demonstrate full R ankle ROM per fusion protocol in order to improve gait mechanics and tolerance to prolonged standing and walking. Ongoing 4/5/21- Improvements made in PF and DF, still limited compared to LLE  3. ? Strength: Pt will demonstrate increased R hip strength to 4+/5 globally in order to improve gait mechanics. MET 3/4/21  4. ? Function: Pt will demonstrate improved functional activity tolerance as evident by an improved score on the FAAM to <50% functional impairment. MET 4/5/21 26% functionally impaired this date  5. Independent with Home Exercise Programs MET 3/4/21     LTG: (to be met in 16 treatments)  1. Reduce pain levels to no greater than 4-5/10 with prolonged standing and walking while performing ADL's once performing WBAT. Progress made 4/5/21- in the morning pt reports pain at 2/10 but increased up to 6/10 at night following activity. 2. Pt will demonstrate ability to ambulate x200 ft with least restrictive assistive device with minimal gait deficits once WBAT and weaned into comfortable sneaker. Progress made 4/5/21 amb with single point cane in comfortable shoe cont gait deficits present and limited tolerance to ambulation d/t pain  3. Pt will increase R ankle strength to 4+/5 globally in order to improve tolerance to performing stairs in order to get down to his basement. MET 4/5/21  4. Pt will demonstrate improved functional activity tolerance as evident by an improved score on the FAAM to <30% functional impairment. MET 4/5/21 26% functionally impaired this date     Pt. Education:  [x] Yes  [] No  [x] Reviewed Prior HEP/Ed  Method of Education: [x] Verbal  [x] Demo  [] Written  Comprehension of Education:  [x] Verbalizes understanding. [] Demonstrates understanding. [] Needs review. [] Demonstrates/verbalizes HEP/Ed previously given.     Sheliah Sandhoff: [] Continue per plan of care.    [x] Other:         Treatment Charges: Mins Units TIME   []  Modalities (CP)      [x]  Ther Exercise 31 2

## 2021-04-30 ENCOUNTER — HOSPITAL ENCOUNTER (OUTPATIENT)
Dept: PHYSICAL THERAPY | Age: 66
Setting detail: THERAPIES SERIES
Discharge: HOME | End: 2021-04-30
Payer: COMMERCIAL

## 2021-04-30 PROCEDURE — 97110 THERAPEUTIC EXERCISES: CPT

## 2021-04-30 PROCEDURE — 97112 NEUROMUSCULAR REEDUCATION: CPT

## 2021-04-30 NOTE — FLOWSHEET NOTE
[facility] Out[facility]   [facility]   Phone: [phone]   Fax: [phone]    Physical Therapy Daily Treatment Note      Date:  2021  Patient Name:  Manjula Oconnor    :  1955  MRN: [id number]  Physician: Dr. Carri Rocha MD       Insurance: Worker's Comp, 16 vs through 21, presumptive auth for additional visits  Diagnosis: M19.171 - Secondary localized osteoarthritis of right ankle and foot                Rehab Codes: M19.171, M25.571, M79.671, R26.2, M62.81   Onset Date: surgery 2020       Next Dr. Hernandez Dec: 2021  Visit# / total visits:  Cancels/No Shows: 0/0    Subjective: 2021 Pt has no new complaints this session and reports he is going to be out of town leaving next weekend. Pain:  []? Yes  [x]? No   Location: R ankle/foot; R heel, distal achilles into calf           Pain Rating: (0-10 scale) 0/10  Pain altered Tx:  [x]? No  []?  Yes  Action:   Comments:         Objective:   Modalities: Vaso low pressure 34* 15' R ankle 2021- held  Precautions: (in CAM boot when ambulating) 50% Partial Wb'ing x 2 weeks (-), then 75% Partial Wb'ing x 2 weeks (-3/5), Full WB WITH CAM boot beginning 3/6/21    Exercises:  Exercises: Exercise WB this date performed in (B) Tennis shoes  Exercise       Reps/ Time Weight/ Level Comments Completed 2021      Sidelying hip abd RLE only 10x2         Supine DF + Inv 5x45\"    strap     Active ankle pumps 2x10    HEP  HEP   Supine HS stretch 3x 30\"    HEP     Weight shifts (AP, ML) 10x ea      Transfers from sit<>stand 10x2 Use of analog scale (75% WB)  **Inc to 75% WB per doctor    Obstacle course 2 laps Forward/Lateral Stepping over hurdles and on airdex foam x   NuStep 5' lvl 4  x   SLS on foam 3x30\"  Add  x   Tandem stance on foam 3x 30\"  Add     Standing Marching/Leg Curls 15x each   marching on foam    Foot Doming 10x with 3\"   HEP   Standing 3 way hip (B) 10x1   Light UE Support; 4/30 added foam under R ankle  HEP x   SB calf stretch  30\"x3     x   amb in //bars  5x Fwd/retro/lateral      Cone step overs  2x  Fwd/lateral       Ascending/descending stairs 1x full flight of stairs  ascending with hand rail on R side and descending with rail on L side.  With St cane and one UE support to mimic stairs at home    Seated BAPS  10x2  L2  A/P med/lat HEP   Seated Marching 10x2 3#   HEP   Seated LAQ 10x2 3#   HEP   Seated HS curl 10x2 Blue TB   HEP   resisted DF/PF 10x2 [employer] to blue 4/9 HEP   Mini squats  10x2        Resisted amb free motion 4x 20#  fwd/retro/side stepping    Retro amb in // bars 5x      Gait without LBQC 2 laps around gym        Step Downs 15x  Added 4/9; UE A    Step ups fwd/lat WB R LE 15x each 8'\" 4/23 inc to 8\" x   Gait with Straight Cane In Shoe 1 lap   no AD this date    Heel raises 20x   x   Heel taps 15x2      Ankle alphabet 1x   HEP   Other:  HEP: Ankle pumps, HS stretch, SLR, Standing 3 way hip       Specific Instructions for next treatment: Progress with WBAT without Cam boot; Restore R ankle AROM and progress hip strengthening per protocol       Assessment: [x] Progressing toward goals. [] No change. [] Other:    [x] Patient would continue to benefit from skilled physical therapy services in order to: improve ROM and strength, normalize gait pattern and decrease pain in order to return to performing independent ADL's.     4/30/201:  Began tx on NuStep to inc strength in BLEs to improve stability when amb and to incorporate WB into RLE with resistance. Added foam to 3 way hip this session when WB on RLE with inc in pain noted in R heel. Pt reports continued pain and discomfort in R heel throughout duration of session following 3 way hip. Pt performed obstacle course 4x beginning CGA until he was comfortable after the 2nd trial and became SBA.   Pt needed VC to slow down when maneuvering through obstacle course and when pivoting in a [place]. Pt has tendency to move too fast and crosses feet which could result in LOB. Besides discomfort in heel, pt has no other complaints at the end of session. STG: (to be met in 8 treatments) Goals addressed by evaluating PT  1. ? Pain: Decrease pain levels to < 2/10 with progression of strengthening and WBing exercises throughout therapy.  MET 3/4/21  2. ? ROM: Pt will demonstrate full R ankle ROM per fusion protocol in order to improve gait mechanics and tolerance to prolonged standing and walking. Ongoing 4/5/21- Improvements made in PF and DF, still limited compared to LLE  3. ? Strength: Pt will demonstrate increased R hip strength to 4+/5 globally in order to improve gait mechanics. MET 3/4/21  4. ? Function: Pt will demonstrate improved functional activity tolerance as evident by an improved score on the FAAM to <50% functional impairment. MET 4/5/21 26% functionally impaired this date  5. Independent with Home Exercise Programs MET 3/4/21     LTG: (to be met in 16 treatments)  1. Reduce pain levels to no greater than 4-5/10 with prolonged standing and walking while performing ADL's once performing WBAT. Progress made 4/5/21- in the morning pt reports pain at 2/10 but increased up to 6/10 at night following activity. 2. Pt will demonstrate ability to ambulate x200 ft with least restrictive assistive device with minimal gait deficits once WBAT and weaned into comfortable sneaker. Progress made 4/5/21 amb with single point cane in comfortable shoe cont gait deficits present and limited tolerance to ambulation d/t pain  3. Pt will increase R ankle strength to 4+/5 globally in order to improve tolerance to performing stairs in order to get down to his basement. MET 4/5/21  4. Pt will demonstrate improved functional activity tolerance as evident by an improved score on the FAAM to <30% functional impairment. MET 4/5/21 26% functionally impaired this date     Pt.  Education:  [x] Yes  [] No  [x]

## 2021-05-04 ENCOUNTER — HOSPITAL ENCOUNTER (OUTPATIENT)
Dept: PHYSICAL THERAPY | Age: 66
Setting detail: THERAPIES SERIES
Discharge: HOME | End: 2021-05-04
Payer: COMMERCIAL

## 2021-05-04 PROCEDURE — 97110 THERAPEUTIC EXERCISES: CPT

## 2021-05-04 PROCEDURE — 97112 NEUROMUSCULAR REEDUCATION: CPT

## 2021-05-04 NOTE — FLOWSHEET NOTE
[address] Outpatient Physical Therapy   [address]   Phone: [phone]   Fax: [phone]    Physical Therapy Daily Treatment Note      Date:  2021  Patient Name:  Debby Cummings    :  1955  MRN: [id number]  Physician: Dr. Jaycee Pimentel MD       Insurance: Worker's Comp, 16 vs through 21, presumptive auth for additional visits  Diagnosis: M19.171 - Secondary localized osteoarthritis of right ankle and foot                Rehab Codes: M19.171, M25.571, M79.671, R26.2, M62.81   Onset Date: surgery 2020       Next Dr. Mirella Spangler: 2021  Visit# / total visits:  Cancels/No Shows: 0/0    Subjective: 2021: Pt had no new complaints this session. Pain:  []? Yes  [x]? No   Location: R ankle/foot; R heel, distal achilles into calf           Pain Rating: (0-10 scale) 0/10  Pain altered Tx:  [x]? No  []?  Yes  Action:   Comments:         Objective:   Modalities: Vaso low pressure 34* 15' R ankle 2021- held  Precautions: (in CAM boot when ambulating) 50% Partial Wb'ing x 2 weeks (-), then 75% Partial Wb'ing x 2 weeks (-3/5), Full WB WITH CAM boot beginning 3/6/21    Exercises:  Exercises: Exercise WB this date performed in (B) Tennis shoes  Exercise       Reps/ Time Weight/ Level Comments Completed 2021      Sidelying hip abd RLE only 10x2         Supine DF + Inv 5x45\"    strap     Active ankle pumps 2x10    HEP  HEP   Supine HS stretch 3x 30\"    HEP     Weight shifts (AP, ML) 10x ea      Transfers from sit<>stand 10x2 Use of analog scale (75% WB)  **Inc to 75% WB per doctor    Obstacle course 2 laps Forward/Lateral Stepping over hurdles and on airex foam x   NuStep 5' lvl 5  inc to lvl 5 x   SLS on foam 3x30\"  Add  x   Tandem on foam with OH and chest press 10x2   x   Tandem stance on foam 3x 30\"  Add     Standing Marching/Leg Curls 15x each   marching on foam    Foot Doming 10x with 3\"   HEP   Fitter board fwd/lat 20x progression of strengthening and WBing exercises throughout therapy.  MET 3/4/21  2. ? ROM: Pt will demonstrate full R ankle ROM per fusion protocol in order to improve gait mechanics and tolerance to prolonged standing and walking. Ongoing 4/5/21- Improvements made in PF and DF, still limited compared to LLE  3. ? Strength: Pt will demonstrate increased R hip strength to 4+/5 globally in order to improve gait mechanics. MET 3/4/21  4. ? Function: Pt will demonstrate improved functional activity tolerance as evident by an improved score on the FAAM to <50% functional impairment. MET 4/5/21 26% functionally impaired this date  5. Independent with Home Exercise Programs MET 3/4/21     LTG: (to be met in 16 treatments)  1. Reduce pain levels to no greater than 4-5/10 with prolonged standing and walking while performing ADL's once performing WBAT. Progress made 4/5/21- in the morning pt reports pain at 2/10 but increased up to 6/10 at night following activity. 2. Pt will demonstrate ability to ambulate x200 ft with least restrictive assistive device with minimal gait deficits once WBAT and weaned into comfortable sneaker. Progress made 4/5/21 amb with single point cane in comfortable shoe cont gait deficits present and limited tolerance to ambulation d/t pain  3. Pt will increase R ankle strength to 4+/5 globally in order to improve tolerance to performing stairs in order to get down to his basement. MET 4/5/21  4. Pt will demonstrate improved functional activity tolerance as evident by an improved score on the FAAM to <30% functional impairment. MET 4/5/21 26% functionally impaired this date     Pt. Education:  [x] Yes  [] No  [x] Reviewed Prior HEP/Ed  Method of Education: [x] Verbal  [x] Demo  [] Written  Comprehension of Education:  [x] Verbalizes understanding. [] Demonstrates understanding. [] Needs review. [] Demonstrates/verbalizes HEP/Ed previously given.     Chon John: [] Continue per plan of care.    [x] Other:         Treatment Charges: Mins Units TIME   []  Modalities (CP)      [x]  Ther Exercise 15 1 0271-0954   []  Manual Therapy      []  Ther Activities      []  Aquatics      [x]  Neuromuscular 30 2 9091-8811   [] Vasocompression      [] Gait Training      [] Dry needling        [] 1 or 2 muscles        [] 3 or more muscles      []  Other- re-eval from SPT      Total Treatment time 45 3 9655-2447     Time In: 0930  OUT: 0789    Electronically signed by Efra [place] PTA

## 2021-05-06 ENCOUNTER — HOSPITAL ENCOUNTER (OUTPATIENT)
Dept: PHYSICAL THERAPY | Age: 66
Setting detail: THERAPIES SERIES
Discharge: HOME | End: 2021-05-06
Payer: COMMERCIAL

## 2021-05-06 PROCEDURE — 97110 THERAPEUTIC EXERCISES: CPT

## 2021-05-06 NOTE — FLOWSHEET NOTE
[facility]   [address] [facility] [address]   Phone: [phone]   Fax: [phone]    Physical Therapy Daily Treatment Note      Date:  2021  Patient Name:  Irina Wallace    :  1955  MRN: [id number]  Physician: Dr. 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 Erica Chuas: 2021  Visit# / total visits:  Cancels/No Shows: 0/0    Subjective: 2021: Pt has no new complaints coming into this morning's session. Pain:  []? Yes  [x]? No   Location: R ankle/foot; R heel, distal achilles into calf           Pain Rating: (0-10 scale) 0/10  Pain altered Tx:  [x]? No  []?  Yes  Action:   Comments:         Objective:   Modalities: Vaso low pressure 34* 15' R ankle 2021- held  Precautions: (in CAM boot when ambulating) 50% Partial Wb'ing x 2 weeks (-), then 75% Partial Wb'ing x 2 weeks (-3/5), Full WB WITH CAM boot beginning 3/6/21    Exercises:  Exercises: Exercise WB this date performed in (B) Tennis shoes  Exercise       Reps/ Time Weight/ Level Comments Completed 2021      Sidelying hip abd RLE only 10x2         Supine DF + Inv 5x45\"    strap     Active ankle pumps 2x10    HEP  HEP   Supine HS stretch 3x 30\"    HEP     Weight shifts (AP, ML) 10x ea      Transfers from sit<>stand 10x2 Use of analog scale (75% WB)  **Inc to 75% WB per doctor    Obstacle course 2 laps Forward/Lateral Stepping over hurdles and on airex foam    NuStep 8' lvl 5  inc to lvl 5 x   SLS on foam 3x30\"  Add  x   Tandem on foam with OH and chest press 10x2  5/6 pt having difficulty with tandem stance; changed to staggered stance x   Tandem stance on foam 3x 30\"  Add     Standing Marching/Leg Curls 10x2  4/23 marching on foam x   Foot Doming 10x with 3\"   HEP   Fitter board fwd/lat 20x  No UE support    Standing 3 way hip (B) 10x1 lime Light UE Support; 4/30 added foam under R ankle   x   SB calf stretch  30\"x3     x   amb in //bars  5x Fwd/retro/lateral      Cone step overs  2x  Fwd/lateral       Ascending/descending stairs 1x full flight of stairs  ascending with hand rail on R side and descending with rail on L side.  With St cane and one UE support to mimic stairs at home    Seated BAPS  10x2  L2  A/P med/lat HEP   Seated Marching 10x2 3#   HEP   Seated LAQ 10x2 3#   HEP   Seated HS curl 10x2 Blue TB   HEP   resisted DF/PF 10x2 BLue Inc to blue 4/9 HEP   Mini squats  10x2        Resisted amb free motion 4x 20#  fwd/retro/side stepping    Retro amb in // bars 5x      Gait without LBQC 2 laps around gym        Step Downs 15x  Added 4/9; UE A    Step ups fwd/lat WB R LE 15x each 8'\" 4/23 inc to 8\"    Gait with Straight Cane In Shoe 1 lap   no AD this date    Heel raises 20x  HEP in Seated; attempted on foam, floor, TG and leg press. x   Heel taps 15x2      Ankle alphabet 1x   HEP   Other:  HEP: Ankle pumps, HS stretch, SLR, Standing 3 way hip       Specific Instructions for next treatment: Progress with WBAT without Cam boot; Restore R ankle AROM and progress hip strengthening per protocol       Assessment: [x] Progressing toward goals. [] No change. [] Other:    [x] Patient would continue to benefit from skilled physical therapy services in order to: improve ROM and strength, normalize gait pattern and decrease pain in order to return to performing independent ADL's.     5/6/2021: Inc time to NuStep this session to inc strength and activity tolerance. Added Lime TB to 3 way hip on foam to inc strength and balance. VC needed to correct posture and technique. Pt demos difficulty with heel raises on foam and on floor.   Pt was moved to TG to perform heel raises; pt demos difficulty with full heel raises and with proprioception of RLE onto platform of TG and on leg press d/t dec sensation in RLE. Pt states his neuropathy has gotten worse since surgery. Pt ed on seated heel raises for HEP to inc gastroc strength. STG: (to be met in 8 treatments) Goals addressed by evaluating PT  1. ? Pain: Decrease pain levels to < 2/10 with progression of strengthening and WBing exercises throughout therapy.  MET 3/4/21  2. ? ROM: Pt will demonstrate full R ankle ROM per fusion protocol in order to improve gait mechanics and tolerance to prolonged standing and walking. Ongoing 4/5/21- Improvements made in PF and DF, still limited compared to LLE  3. ? Strength: Pt will demonstrate increased R hip strength to 4+/5 globally in order to improve gait mechanics. MET 3/4/21  4. ? Function: Pt will demonstrate improved functional activity tolerance as evident by an improved score on the FAAM to <50% functional impairment. MET 4/5/21 26% functionally impaired this date  5. Independent with Home Exercise Programs MET 3/4/21     LTG: (to be met in 16 treatments)  1. Reduce pain levels to no greater than 4-5/10 with prolonged standing and walking while performing ADL's once performing WBAT. Progress made 4/5/21- in the morning pt reports pain at 2/10 but increased up to 6/10 at night following activity. 2. Pt will demonstrate ability to ambulate x200 ft with least restrictive assistive device with minimal gait deficits once WBAT and weaned into comfortable sneaker. Progress made 4/5/21 amb with single point cane in comfortable shoe cont gait deficits present and limited tolerance to ambulation d/t pain  3. Pt will increase R ankle strength to 4+/5 globally in order to improve tolerance to performing stairs in order to get down to his basement. MET 4/5/21  4. Pt will demonstrate improved functional activity tolerance as evident by an improved score on the FAAM to <30% functional impairment.  MET 4/5/21 26% functionally impaired

## 2021-05-11 ENCOUNTER — APPOINTMENT (OUTPATIENT)
Dept: PHYSICAL THERAPY | Age: 66
End: 2021-05-11
Payer: COMMERCIAL

## 2021-05-13 ENCOUNTER — APPOINTMENT (OUTPATIENT)
Dept: PHYSICAL THERAPY | Age: 66
End: 2021-05-13
Payer: COMMERCIAL

## 2021-05-17 ENCOUNTER — HOSPITAL ENCOUNTER (OUTPATIENT)
Dept: PHYSICAL THERAPY | Age: 66
Setting detail: THERAPIES SERIES
Discharge: HOME | End: 2021-05-17
Payer: COMMERCIAL

## 2021-05-17 PROCEDURE — 97110 THERAPEUTIC EXERCISES: CPT

## 2021-05-17 NOTE — FLOWSHEET NOTE
[address] [facility]   [address]   Phone: [phone]   Fax: [phone]    Physical Therapy Daily Treatment Note      Date:  2021  Patient Name:  Wallace Mendiola    :  1955  MRN: [id number]  Physician: Dr. Jeraldine Scheuermann, MD       Insurance: Worker's Comp, 12 additional Vs through 21  Diagnosis: M19.171 - Secondary localized osteoarthritis of right ankle and foot                Rehab Codes: M19.171, M25.571, M79.671, R26.2, M62.81   Onset Date: surgery 2020       Next Dr. Israel Taylor: 2021  Visit# / total visits:  Cancels/No Shows: 0/0    Subjective: Pt continues to report that he is feeling good. Pt reports that he is most challenged with steps but does fine with upper extremity support. Pain:  []? Yes  [x]? No   Location: R ankle/foot; R heel, distal achilles into calf           Pain Rating: (0-10 scale) 0/10  Pain altered Tx:  [x]? No  []?  Yes  Action:   Comments:         Objective:   Modalities: Vaso low pressure 34* 15' R ankle 2021- held  Precautions: (in CAM boot when ambulating) 50% Partial Wb'ing x 2 weeks (-), then 75% Partial Wb'ing x 2 weeks (-3/5), Full WB WITH CAM boot beginning 3/6/21    Exercises:  Exercises: Exercise WB this date performed in (B) Tennis shoes  Exercise       Reps/ Time Weight/ Level Comments Completed 2021      Sidelying hip abd RLE only 10x2         Supine DF + Inv 5x45\"    strap     Active ankle pumps 2x10    HEP  HEP   Supine HS stretch 3x 30\"    HEP     Weight shifts (AP, ML) 10x ea      Transfers from sit<>stand 10x2 Use of analog scale (75% WB)  **Inc to 75% WB per doctor    Obstacle course 2 laps Forward/Lateral Stepping over hurdles and on airex foam    NuStep 5' lvl 5  inc to lvl 5 x   SLS on foam 3x30\"  Add 4/23 x   Tandem on foam with OH and chest press 10x2  5/6 pt having difficulty with tandem stance; changed to staggered stance x   Tandem stance on foam 3x 30\"  Add 4/23    Standing Marching/Leg Curls 10x2  4/23 marching on foam x   Foot Doming 10x with 3\"   HEP   Fitter board fwd/lat 20x  No UE support    Standing 3 way hip (B) 10x1 lime Light UE Support; 4/30 added foam under R ankle   x   SB calf stretch  30\"x3     x   amb in //bars  5x Fwd/retro/lateral      Cone step overs  2x  Fwd/lateral       Ascending/descending stairs 1x full flight of stairs  ascending with hand rail on R side and descending with rail on L side.  With St cane and one UE support to mimic stairs at home    Seated BAPS  10x2  L2  A/P med/lat HEP   Seated Marching 10x2 3#   HEP   Seated LAQ 10x2 3#   HEP   Seated HS curl 10x2 Blue TB   HEP   resisted DF/PF 10x2 BLue Inc to blue 4/9 HEP   Mini squats  10x2        Resisted amb free motion 4x 20#  fwd/retro/side stepping    Retro amb in // bars 5x      Gait without LBQC 2 laps around gym        Step Downs 15x 6\" Added 4/9; UE A x   Step ups fwd/lat WB R LE 15x each 8'\" 4/23 inc to 8\" x   Gait with Straight Cane In Shoe 1 lap   no AD this date    Heel raises 20x  HEP in Seated; attempted on foam, floor, TG and leg press. x   Heel taps 15x2 2\"  x   Ankle alphabet 1x   HEP   Other:  HEP: Ankle pumps, HS stretch, SLR, Standing 3 way hip       Specific Instructions for next treatment: Progress with WBAT without Cam boot; Restore R ankle AROM and progress hip strengthening per protocol     Reassessed by primary PT 5/17/21 ROM  ° A/P STRENGTH     Left Right Left Right   Hip Flex     5 5   Ankle PF 28 25 5 5   DF  (knee straight) 15 20 5 5   DF   (knee flexed) 20 15 5 --   Inv 42 8 5 4+   Ever 10 5 5 4+   1st MTP DF           1st MTP PF                             Assessment: [x] Progressing toward goals. Decreased time and rest breaks required to complete charted exercises at this date. Pt with good recall of exercises and requires minimal verbal cues for proper demonstration.  Performed heel taps, step ups and step downs at this date due to patient continuing to be challenged with stair climbing, mainly descending due to impaired eccentric control. Pt reporting increased fatigue by end of completion of heel taps. Pt continues to be challenged with heel raises secondary to impaired ROM and feeling of tightness in his achilles. [] No change. [] Other:    [x] Patient would continue to benefit from skilled physical therapy services in order to: improve ROM and strength, normalize gait pattern and decrease pain in order to return to performing independent ADL's. STG: (to be met in 8 treatments) Goals addressed by evaluating PT  1. ? Pain: Decrease pain levels to < 2/10 with progression of strengthening and WBing exercises throughout therapy.  MET 3/4/21  2. ? ROM: Pt will demonstrate full R ankle ROM per fusion protocol in order to improve gait mechanics and tolerance to prolonged standing and walking. Ongoing 4/5/21- Improvements made in PF and DF, still limited compared to LLE  3. ? Strength: Pt will demonstrate increased R hip strength to 4+/5 globally in order to improve gait mechanics. MET 3/4/21  4. ? Function: Pt will demonstrate improved functional activity tolerance as evident by an improved score on the FAAM to <50% functional impairment. MET 4/5/21 26% functionally impaired this date  5. Independent with Home Exercise Programs MET 3/4/21     LTG: (to be met in 16 treatments)  1. Reduce pain levels to no greater than 4-5/10 with prolonged standing and walking while performing ADL's once performing WBAT. Progress made 4/5/21- in the morning pt reports pain at 2/10 but increased up to 6/10 at night following activity. 2. Pt will demonstrate ability to ambulate x200 ft with least restrictive assistive device with minimal gait deficits once WBAT and weaned into comfortable sneaker. Progress made 4/5/21 amb with single point cane in comfortable shoe cont gait deficits present and limited tolerance to ambulation d/t pain  3.  Pt will increase R ankle strength to 4+/5 globally in order to improve tolerance to performing stairs in order to get down to his basement. MET 4/5/21  4. Pt will demonstrate improved functional activity tolerance as evident by an improved score on the FAAM to <30% functional impairment. MET 4/5/21 26% functionally impaired this date     Pt. Education:  [x] Yes  [] No  [x] Reviewed Prior HEP/Ed  Method of Education: [x] Verbal  [x] Demo  [] Written  Comprehension of Education:  [x] Verbalizes understanding. [] Demonstrates understanding. [] Needs review. [] Demonstrates/verbalizes HEP/Ed previously given.     Jalil Faith: [] Continue per plan of care.    [x] Other:         Treatment Charges: Mins Units TIME   []  Modalities (CP)      [x]  Ther Exercise 33 2 3347-8125   []  Manual Therapy      []  Ther Activities      []  Aquatics      [x]  Neuromuscular      [] Vasocompression      [] Gait Training      [] Dry needling        [] 1 or 2 muscles        [] 3 or more muscles      []  Other- re-eval from [facility]      Total Treatment time 33 2 9918-3956     Time In: 8270  OUT: 6266    Electronically signed by Luanne Ag, PT

## 2021-05-19 DIAGNOSIS — M25.571 ACUTE RIGHT ANKLE PAIN: Primary | ICD-10-CM

## 2021-05-19 DIAGNOSIS — M79.671 RIGHT FOOT PAIN: ICD-10-CM

## 2021-05-20 ENCOUNTER — HOSPITAL ENCOUNTER (OUTPATIENT)
Dept: PHYSICAL THERAPY | Age: 66
Setting detail: THERAPIES SERIES
Discharge: HOME | End: 2021-05-20
Payer: COMMERCIAL

## 2021-05-20 PROCEDURE — 97110 THERAPEUTIC EXERCISES: CPT

## 2021-05-20 NOTE — FLOWSHEET NOTE
[address] [facility]   [address]   Phone: [phone]   Fax: [phone]    Physical Therapy Daily Treatment Note      Date:  2021  Patient Name:  Henny Tripp    :  1955  MRN: [id number]  Physician: Dr. 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 Mccormack Greet: 2021  Visit# / total visits:  Cancels/No Shows: 0/0    Subjective: Pt reports to therapy with no new complaints. Pt states alternating when descending stairs is still difficult. Pain:  []? Yes  [x]? No   Location: R ankle/foot; R heel, distal achilles into calf           Pain Rating: (0-10 scale) 0/10  Pain altered Tx:  [x]? No  []?  Yes  Action:   Comments:         Objective:   Modalities: Vaso low pressure 34* 15' R ankle-held  Precautions: (in CAM boot when ambulating) 50% Partial Wb'ing x 2 weeks (-), then 75% Partial Wb'ing x 2 weeks (-3/5), Full WB WITH CAM boot beginning 3/6/21    Exercises:  Exercises: Exercise WB this date performed in (B) Tennis shoes  Exercise       Reps/ Time Weight/ Level Comments Completed 2021      Sidelying hip abd RLE only 10x2         Supine DF + Inv 5x45\"    strap     Weight shifts (AP, ML) 10x ea      Transfers from sit<>stand 10x2 Use of analog scale (75% WB)  **Inc to 75% WB per doctor    Obstacle course 2 laps Forward/Lateral Stepping over hurdles and on airex foam    NuStep 5' lvl 5  inc to lvl 5    SLS on foam 3x30\"  Add  x   Tandem on foam with OH and chest press 10x2   pt having difficulty with tandem stance; changed to staggered stance    Tandem stance on foam 3x 30\"  Add     Standing Marching/Leg Curls 10x2 Foam  x   Fitter board fwd/lat 20x  No UE support    Standing 3 way hip (B) 10x1 lime Light UE Support; 4/30 added foam under R ankle   x   SB calf stretch  30\"x3     x   amb in //bars  5x Fwd/retro/lateral      Cone step overs  2x  Fwd/lateral       Ascending/descending stairs 1x full flight of stairs  ascending with hand rail on R side and descending with rail on L side.  With St cane and one UE support to mimic stairs at home    Mini squats  10x2        Resisted amb free motion 4x 20#  fwd/retro/side stepping    Retro amb in // bars 5x      Gait without LBQC 2 laps around gym        Step Downs 15x2 6\" 5/20: inc sets x   Step ups fwd/lat WB R LE 15x each 8\" 4/23 inc to 8\" x   Gait with Straight Cane In Shoe 1 lap   no AD this date    Heel raises 20x  HEP in Seated; attempted on foam, floor, TG and leg press. x   Heel taps 15x2 4\" 5/20: inc to 4\" step x   Treadmill 5' Lv1   x   Other:  HEP: Ankle pumps, HS stretch, SLR, Standing 3 way hip, foot doming,  Seated: BAPs, marching, LAQ, resisted ankle, HSC Ankle alphabet      Specific Instructions for next treatment: Progress with WBAT without Cam boot; Restore R ankle AROM and progress hip strengthening per protocol     Reassessed by primary PT 5/17/21 ROM  ° A/P STRENGTH     Left Right Left Right   Hip Flex     5 5   Ankle PF 28 25 5 5   DF  (knee straight) 15 20 5 5   DF   (knee flexed) 20 15 5 --   Inv 42 8 5 4+   Ever 10 5 5 4+   1st MTP DF           1st MTP PF                           Assessment: [x] Progressing toward goals. [] No change. [] Other:    [x] Patient would continue to benefit from skilled physical therapy services in order to: improve ROM and strength, normalize gait pattern and decrease pain in order to return to performing independent ADL's.      5/20/2021:  Pt continues to demonstrate difficulty with B heel raises this date. Pt reports dec in pain in R heel with step downs. Pt completed charted exercises above without inc in pain. Added treadmill this session for 5' on lvl 1 to inc activity tolerance. Focused this session on strengthening and activity tolerance for RLE to enhance pt's ability to return to daily routine activities without inc in pain and be able to descend stairs safely. STG: (to be met in 8 treatments) Goals addressed by evaluating PT  1. ? Pain: Decrease pain levels to < 2/10 with progression of strengthening and WBing exercises throughout therapy.  MET 3/4/21  2. ? ROM: Pt will demonstrate full R ankle ROM per fusion protocol in order to improve gait mechanics and tolerance to prolonged standing and walking. Ongoing 4/5/21- Improvements made in PF and DF, still limited compared to LLE  3. ? Strength: Pt will demonstrate increased R hip strength to 4+/5 globally in order to improve gait mechanics. MET 3/4/21  4. ? Function: Pt will demonstrate improved functional activity tolerance as evident by an improved score on the FAAM to <50% functional impairment. MET 4/5/21 26% functionally impaired this date  5. Independent with Home Exercise Programs MET 3/4/21     LTG: (to be met in 16 treatments)  1. Reduce pain levels to no greater than 4-5/10 with prolonged standing and walking while performing ADL's once performing WBAT. Progress made 4/5/21- in the morning pt reports pain at 2/10 but increased up to 6/10 at night following activity. 2. Pt will demonstrate ability to ambulate x200 ft with least restrictive assistive device with minimal gait deficits once WBAT and weaned into comfortable sneaker. Progress made 4/5/21 amb with single point cane in comfortable shoe cont gait deficits present and limited tolerance to ambulation d/t pain  3. Pt will increase R ankle strength to 4+/5 globally in order to improve tolerance to performing stairs in order to get down to his basement. MET 4/5/21  4. Pt will demonstrate improved functional activity tolerance as evident by an improved score on the FAAM to <30% functional impairment. MET 4/5/21 26% functionally impaired this date     Pt.  Education:  [x] Yes  [] No  [x] Reviewed Prior HEP/Ed  Method of Education: [x] Verbal  [x] Demo  [] Written  Comprehension of Education:  [x] Verbalizes understanding. [] Demonstrates understanding. [] Needs review. [] Demonstrates/verbalizes HEP/Ed previously given.     Jalil Faith: [] Continue per plan of care.    [x] Other:         Treatment Charges: Mins Units TIME   []  Modalities (CP)      [x]  Ther Exercise 45 3 [id number]   []  Manual Therapy      []  Ther Activities      []  Aquatics      [x]  Neuromuscular      [] Vasocompression      [] Gait Training      [] Dry needling        [] 1 or 2 muscles        [] 3 or more muscles      []  Other- re-eval from SPT      Total Treatment time 45 3 6478-4347     Time In: 3651 OUT: 1200    Electronically signed by Rajinder Payne PTA

## 2021-05-24 ENCOUNTER — HOSPITAL ENCOUNTER (OUTPATIENT)
Dept: PHYSICAL THERAPY | Age: 66
Setting detail: THERAPIES SERIES
Discharge: HOME | End: 2021-05-24
Payer: COMMERCIAL

## 2021-05-24 PROCEDURE — 97112 NEUROMUSCULAR REEDUCATION: CPT

## 2021-05-24 PROCEDURE — 97110 THERAPEUTIC EXERCISES: CPT

## 2021-05-24 NOTE — FLOWSHEET NOTE
[address] [place] [address] Outpatient Physical Therapy   [address] [place] [address] [address]   Phone: [phone]   Fax: [phone]    Physical Therapy Daily Treatment Note      Date:  2021  Patient Name:  Luis Haynes    :  1955  MRN: [id number]  Physician: Dr. Bradley Raya MD       Insurance: Worker's Comp, 12 additional Vs through 21  Diagnosis: M19.171 - Secondary localized osteoarthritis of right ankle and foot                Rehab Codes: M19.171, M25.571, M79.671, R26.2, M62.81   Onset Date: surgery 2020       Next Dr. Henrique Johnson: 2021  Visit# / total visits:  Cancels/No Shows: 0/0    Subjective: Pt reports today feeling well overall. Reported minimal pain in foot today and that he has LBP that is worse. Pain:  [x]? Yes  []? No   Location: R ankle/foot; R heel, distal achilles into calf           Pain Rating: (0-10 scale) 2/10  Pain altered Tx:  [x]? No  []?  Yes  Action:   Comments:         Objective:   Modalities: Vaso low pressure 34* 15' R ankle-held  Precautions: (in CAM boot when ambulating) 50% Partial Wb'ing x 2 weeks (-), then 75% Partial Wb'ing x 2 weeks (-3/5), Full WB WITH CAM boot beginning 3/6/21    Exercises:  Exercises: Exercise WB this date performed in (B) Tennis shoes  Exercise       Reps/ Time Weight/ Level Comments Completed 2021      Sidelying hip abd RLE only 10x2         Supine DF + Inv 5x45\"    strap     Weight shifts (AP, ML) 10x ea      Transfers from sit<>stand 10x2 Use of analog scale (75% WB)  **Inc to 75% WB per doctor    Obstacle course 3 laps Forward/Lateral Stepping over hurdles and on airex foam x   NuStep 5' lvl 5  inc to lvl 5 x   SLS on foam 3x30\"  Add  x   Tandem on foam with OH and chest press 10x2   pt having difficulty with tandem stance; changed to staggered stance    Tandem stance on foam 3x 30\"  Add     Standing Marching/Leg Curls 10x2 Foam  x   Fitter board fwd/lat 20x  No UE support    Standing 3 in order to return to performing independent ADL's. STG: (to be met in 8 treatments) Goals addressed by evaluating PT  1. ? Pain: Decrease pain levels to < 2/10 with progression of strengthening and WBing exercises throughout therapy.  MET 3/4/21  2. ? ROM: Pt will demonstrate full R ankle ROM per fusion protocol in order to improve gait mechanics and tolerance to prolonged standing and walking. Ongoing 4/5/21- Improvements made in PF and DF, still limited compared to LLE  3. ? Strength: Pt will demonstrate increased R hip strength to 4+/5 globally in order to improve gait mechanics. MET 3/4/21  4. ? Function: Pt will demonstrate improved functional activity tolerance as evident by an improved score on the FAAM to <50% functional impairment. MET 4/5/21 26% functionally impaired this date  5. Independent with Home Exercise Programs MET 3/4/21     LTG: (to be met in 16 treatments)  1. Reduce pain levels to no greater than 4-5/10 with prolonged standing and walking while performing ADL's once performing WBAT. Progress made 4/5/21- in the morning pt reports pain at 2/10 but increased up to 6/10 at night following activity. 2. Pt will demonstrate ability to ambulate x200 ft with least restrictive assistive device with minimal gait deficits once WBAT and weaned into comfortable sneaker. Progress made 4/5/21 amb with single point cane in comfortable shoe cont gait deficits present and limited tolerance to ambulation d/t pain  3. Pt will increase R ankle strength to 4+/5 globally in order to improve tolerance to performing stairs in order to get down to his basement. MET 4/5/21  4. Pt will demonstrate improved functional activity tolerance as evident by an improved score on the FAAM to <30% functional impairment. MET 4/5/21 26% functionally impaired this date     Pt.  Education:  [x] Yes  [] No  [x] Reviewed Prior HEP/Ed  Method of Education: [x] Verbal  [x] Demo  [] Written  Comprehension of Education:  [x] Verbalizes understanding. [] Demonstrates understanding. [] Needs review. [] Demonstrates/verbalizes HEP/Ed previously given.     Randol Eng: [] Continue per plan of care.    [x] Other:         Treatment Charges: Mins Units TIME   []  Modalities (CP)      [x]  Ther Exercise 25 2 10:09-10:34    []  Manual Therapy      []  Ther Activities      []  Aquatics      [x]  Neuromuscular 13 1 10:34-10:47   [] Vasocompression      [] Gait Training      [] Dry needling        [] 1 or 2 muscles        [] 3 or more muscles      []  Other- re-eval from SPT      Total Treatment time 38 3        Time In: 10:04 am  OUT: 10:47 am    Electronically signed by Adwoa Perez PTA

## 2021-05-27 ENCOUNTER — HOSPITAL ENCOUNTER (OUTPATIENT)
Dept: PHYSICAL THERAPY | Age: 66
Setting detail: THERAPIES SERIES
Discharge: HOME | End: 2021-05-27
Payer: COMMERCIAL

## 2021-05-27 PROCEDURE — 97112 NEUROMUSCULAR REEDUCATION: CPT

## 2021-05-27 PROCEDURE — 97110 THERAPEUTIC EXERCISES: CPT

## 2021-05-27 NOTE — FLOWSHEET NOTE
Standing 3 way hip (B) 10x1 [place] Light UE Support; 4/30 added foam under R ankle   x   SB calf stretch  30\"x3     x   amb in //bars  5x Fwd/retro/lateral      Cone step overs  2x  Fwd/lateral       Ascending/descending stairs 1x full flight of stairs  ascending with hand rail on R side and descending with rail on L side.  With St cane and one UE support to mimic stairs at home    Mini squats  10x2        Resisted amb free motion 4x 20#  fwd/retro/side stepping    Retro amb in // bars 5x      Gait without LBQC 2 laps around gym        Step Downs 15x2 6\" 5/20: inc sets x   Step ups fwd/lat WB R LE 15x2 each 8\" 4/23 inc to 8\" x   Gait with Straight Cane In Shoe 1 lap   no AD this date    Heel raises 20x  HEP in Seated; attempted on foam, floor, TG and leg press. Floor Today    Heel taps 15x2 4\" 5/20: inc to 4\" step    Treadmill 5' Lv1      Other:  HEP: Ankle pumps, HS stretch, SLR, Standing 3 way hip, foot doming,  Seated: BAPs, marching, LAQ, resisted ankle, HSC Ankle alphabet      Specific Instructions for next treatment: Progress with WBAT without Cam boot; Restore R ankle AROM and progress hip strengthening per protocol     Reassessed by primary PT 5/17/21 ROM  ° A/P STRENGTH     Left Right Left Right   Hip Flex     5 5   Ankle PF 28 25 5 5   DF  (knee straight) 15 20 5 5   DF   (knee flexed) 20 15 5 --   Inv 42 8 5 4+   Ever 10 5 5 4+   1st MTP DF           1st MTP PF                           Assessment: [x] Progressing toward goals. [] No change. [] Other:    [x] Patient would continue to benefit from skilled physical therapy services in order to: improve ROM and strength, normalize gait pattern and decrease pain in order to return to performing independent ADL's.     5/27/2021: Began tx with NuStep to inc strength and activity tolerance and followed with standing strengthening and balance activities.   Focused on SLS on RLE and wt shifting onto stronger LE while on uneven surfaces stepping over hurddle with one UE support needed. STG: (to be met in 8 treatments) Goals addressed by evaluating PT  1. ? Pain: Decrease pain levels to < 2/10 with progression of strengthening and WBing exercises throughout therapy.  MET 3/4/21  2. ? ROM: Pt will demonstrate full R ankle ROM per fusion protocol in order to improve gait mechanics and tolerance to prolonged standing and walking. Ongoing 4/5/21- Improvements made in PF and DF, still limited compared to LLE  3. ? Strength: Pt will demonstrate increased R hip strength to 4+/5 globally in order to improve gait mechanics. MET 3/4/21  4. ? Function: Pt will demonstrate improved functional activity tolerance as evident by an improved score on the FAAM to <50% functional impairment. MET 4/5/21 26% functionally impaired this date  5. Independent with Home Exercise Programs MET 3/4/21     LTG: (to be met in 16 treatments)  1. Reduce pain levels to no greater than 4-5/10 with prolonged standing and walking while performing ADL's once performing WBAT. Progress made 4/5/21- in the morning pt reports pain at 2/10 but increased up to 6/10 at night following activity. 2. Pt will demonstrate ability to ambulate x200 ft with least restrictive assistive device with minimal gait deficits once WBAT and weaned into comfortable sneaker. Progress made 4/5/21 amb with single point cane in comfortable shoe cont gait deficits present and limited tolerance to ambulation d/t pain  3. Pt will increase R ankle strength to 4+/5 globally in order to improve tolerance to performing stairs in order to get down to his basement. MET 4/5/21  4. Pt will demonstrate improved functional activity tolerance as evident by an improved score on the FAAM to <30% functional impairment. MET 4/5/21 26% functionally impaired this date     Pt. Education:  [x] Yes  [] No  [x] Reviewed Prior HEP/Ed  Method of Education: [x] Verbal  [x] Demo  [] Written  Comprehension of Education:  [x] Verbalizes understanding.   [] Demonstrates understanding. [] Needs review. [] Demonstrates/verbalizes HEP/Ed previously given.     Kristan Hand: [] Continue per plan of care.    [x] Other:         Treatment Charges: Mins Units TIME   []  Modalities (CP)      [x]  Ther Exercise 25 2 3097-1089   []  Manual Therapy      []  Ther Activities      []  Aquatics      [x]  Neuromuscular 20 1 5358-0916   [] Vasocompression      [] Gait Training      [] Dry needling        [] 1 or 2 muscles        [] 3 or more muscles      []  Other- re-eval from SPT      Total Treatment time 45 3        Time In: 4246  OUT: 1200    Electronically signed by Tian Hooks PTA

## 2021-06-01 ENCOUNTER — OFFICE VISIT (OUTPATIENT)
Dept: ORTHOPEDIC SURGERY | Age: 66
End: 2021-06-01
Payer: COMMERCIAL

## 2021-06-01 VITALS — WEIGHT: 275 LBS | BODY MASS INDEX: 40.73 KG/M2 | TEMPERATURE: 98.2 F | HEIGHT: 69 IN | RESPIRATION RATE: 12 BRPM

## 2021-06-01 DIAGNOSIS — Z96.9 RETAINED ORTHOPEDIC HARDWARE: ICD-10-CM

## 2021-06-01 DIAGNOSIS — M19.079 ARTHRITIS OF SUBTALAR JOINT: Primary | ICD-10-CM

## 2021-06-01 PROCEDURE — 99213 OFFICE O/P EST LOW 20 MIN: CPT | Performed by: ORTHOPAEDIC SURGERY

## 2021-06-01 NOTE — PROGRESS NOTES
[facility]  [facility]  [address] [address]  Dept: [phone]    Ambulatory Orthopedic Follow Up Visit    Preoperative Diagnosis:   1. 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 Right ankle equinus contracture  3. CAD with h/o Afib s/p ablation + pacemaker  4. Diabetes  5. H/o tobacco use  6. Body mass index is 40.61 kg/m².     Postoperative Diagnosis:   1. Same as above     Procedures Performed:  (11/12/2020)  1. Right foot subtalar fusion   2. Right percutaneous tendoachilles lengthening, performed through separate incision       CHIEF COMPLAINT:    Chief Complaint   Patient presents with    Foot Pain     RIGHT         HISTORY OF PRESENT ILLNESS:       He is a 77 y.o. male, seen again today in the office for follow up of the above problem with a history of pain at the above location. Since being seen last, the patient is doing better. At today's visit, he is using no brace or assistive device. He does report some plantar heel pain worse with ambulation, but denies any lateral hindfoot pain. INTERVAL HISTORY 6/1/2021:  He is seen again today in the office for follow up of a previous issue (as above). Since being seen last, the patient is doing better. At today's visit, he is not using a brace or assistive device. History is obtained today from:   [x]  the patient     []  EMR     []  one family member/friend    []  multiple family members/friends    []  other:      Overall, he reports that his pain is about the same at the end of the day as it was before surgery, however he reports that he is able to do more throughout the day compared to before surgery. REVIEW OF SYSTEMS:   Constitutional: Negative for fever. HENT: Negative for tinnitus. Eyes: Negative for pain. Respiratory: Negative for shortness of breath. Cardiovascular: Negative for chest pain. Gastrointestinal: Negative for abdominal pain. Genitourinary: Negative for dysuria. Skin: Negative for rash. Neurological: Negative for headaches. Hematological: Does not bruise/bleed easily. Musculoskeletal: See HPI for pertinent positives     Past Medical History:    He  has a past medical history of Allergic rhinitis, Anxiety, Arrhythmia, Arthritis, Atrial fibrillation ([address] Benign hypertension (5/26/2015), Blind right eye, Caffeine use, Cough, Depression, Diabetes mellitus ([address] GERD (gastroesophageal reflux disease), Glaucoma, Hyperlipidemia LDL goal < 100 (5/26/2015), Hypertrophy of prostate with urinary obstruction and other lower urinary tract symptoms (LUTS), Morbid obesity ([address] (5/26/2015), Mumps, Unspecified sleep apnea, and Wears glasses. Past Surgical History:    He  has a past surgical history that includes Vasectomy; Cystocopy; Cardioversion (2012 ?); Colonoscopy; Elbow surgery (Right, 1967); Total knee arthroplasty (Right, 2015); Corneal transplant (Right, multiple); Foot surgery (Right, 07/14/2016); Ankle arthroscopy (Right, 02/23/2017); Ankle arthroscopy (Right, 2/23/2017); pacemaker placement (12/23/2016); and Ankle surgery (Right, 11/12/2020).      Current Medications:     Current Outpatient Medications:     JANUMET XR  MG TB24 per extended release tablet, TAKE 1 TABLET EVERY OTHER  DAY, Disp: 45 tablet, Rfl: 3    pantoprazole (PROTONIX) 40 MG tablet, TAKE 1 TABLET DAILY, Disp: 90 tablet, Rfl: 3    atorvastatin (LIPITOR) 80 MG tablet, Take 1 tablet by mouth daily, Disp: 90 tablet, Rfl: 3    Alcohol Swabs (ALCOHOL PREP) 70 % PADS, Check glucose AC BID, dx E11.9, may sub covered product, Disp: 200 each, Rfl: 11    blood glucose test strips (ASCENSIA AUTODISC VI;ONE TOUCH ULTRA TEST VI) strip, Check glucose AC BID, dx E11.9, may sub covered product, Disp: 200 strip, Rfl: 11    Lancets MISC, 1 each by Does not apply route daily, Disp: 200 each, Rfl: 5    Lancet Devices (LANCING DEVICE) MISC, Check glucose AC BID, dx E11.9, may sub covered product, Disp: 1 each, Rfl: 0    Blood Glucose Monitoring Suppl (ONE TOUCH ULTRA 2) w/Device KIT, Check glucose AC BID, dx E11.9, may sub covered product, Disp: 1 kit, Rfl: 0    blood glucose test strips (ACCU-CHEK GUIDE) strip, CHECK TWO TIMES A DAY., Disp: 200 strip, Rfl: 3    metoprolol succinate (TOPROL XL) 25 MG extended release tablet, Take 1 tablet by mouth daily, Disp: 90 tablet, Rfl: 3    hydroCHLOROthiazide (HYDRODIURIL) 25 MG tablet, TAKE 1 TABLET DAILY, Disp: 90 tablet, Rfl: 3    telmisartan (MICARDIS) 40 MG tablet, TAKE 1 TABLET DAILY (Patient taking differently: nightly ), Disp: 90 tablet, Rfl: 3    erythromycin (ROMYCIN) 5 MG/GM ophthalmic ointment, , Disp: , Rfl:     B Complex Vitamins (VITAMIN-B COMPLEX) TABS, Take 1 tablet by mouth, Disp: , Rfl:     prednisoLONE acetate (PRED FORTE) 1 % ophthalmic suspension, Place 1 drop into the right eye nightly , Disp: , Rfl:     Multiple Vitamins-Minerals (THERAPEUTIC MULTIVITAMIN-MINERALS) tablet, Take 1 tablet by mouth daily, Disp: , Rfl:     sertraline (ZOLOFT) 100 MG tablet, Take 100 mg by mouth nightly Indications: Depression , Disp: , Rfl:     loratadine (CLARITIN) 10 MG tablet, Take 1 tablet by mouth daily (Patient taking differently: Take 10 mg by mouth daily as needed prn), Disp: 30 tablet, Rfl: 11    fluticasone (FLONASE) 50 MCG/ACT nasal spray, 2 sprays by Nasal route daily (Patient taking differently: 2 sprays by Nasal route nightly as needed ), Disp: 1 Bottle, Rfl: 11     Allergies: Other, Penicillins, Sulfa antibiotics, Tetanus immune globulin, Tetanus toxoids, and Timolol maleate    Family History:  family history includes Asthma in his sister; Cancer in his mother and sister;  Heart Attack in his father; Heart Disease in his brother and another family member; High Blood Pressure in his father; Kidney Cancer in his father. Social History:   Social History     Occupational History    Occupation: Retired    Tobacco Use    Smoking status: Former Smoker     Packs/day: 1.50     Years: 20.00     Pack years: 30.00     Types: Cigarettes     Start date:      Quit date:      Years since quittin.4    Smokeless tobacco: Former User    Tobacco comment: quit    Vaping Use    Vaping Use: Never used   Substance and Sexual Activity    Alcohol use: No     Alcohol/week: 0.0 standard drinks    Drug use: No    Sexual activity: Yes     Partners: Female       OBJECTIVE:  Temp 98.2 °F (36.8 °C)   Resp 12   Ht 5' 9\" (1.753 m)   Wt 275 lb (124.7 kg)   BMI 40.61 kg/m²    Psych: alert and oriented to person, time, and place  Cardio:  well perfused extremities  Resp:  normal respiratory effort  Skin:  no cyanosis  Hem/lymph:  no lymphedema  Neuro:  sensation to light touch unchanged since last visit  Musculoskeletal:    Incisions clean/dry/intact, no erythema/dehiscence/drainage  Sensation to light touch grossly intact throughout   Warm and well perfused  Grossly neurovascularly intact distally  No signs of infection  -No gross motion of the subtalar joint, painless ankle range of motion but decreased  -Tenderness at the sinus Tarsi and plantar heel  -No significant tenderness over the hardware      RADIOLOGY:   2021 FINDINGS:  Three weightbearing views (AP, Oblique, Lateral) of the right foot and 3 weightbearing views (AP, mortise, and lateral) of the right ankle were obtained in the office today and reviewed, revealing no acute fracture, dislocation, or radioopaque foreign body/tumor. Intact hardware status post subtalar fusion without evidence of loosening. No interval displacement. Questionable interval healing.     IMPRESSION: Status post subtalar fusion as above.     Electronically signed by Melquiades Blackwood MD      ASSESSMENT AND PLAN:  Body mass index is 40.61 kg/m².        He is status post the above (on 11/12/2020), doing well overall, but with some questionably delayed radiographic healing as well as some plantar heel pain. He has a history of right severe subtalar joint arthritis (status post attempted subtalar fusion with subsequent removal of hardware, with nonunion of his subtalar joint), along with ipsilateral ankle arthritis with anterior impingement, as sequelae of a fall from a ladder in 2005 while at work.  Meaganri Postal a somewhat complex past medical history including but not limited to the following:  Atrial fibrillation (status post ablation plus pacemaker; not taking a blood thinner), KIM, obesity (BMI greater than 40), non-insulin-dependent diabetes, and tobacco use (reports he smokes 1 cigar/day).   His most recent hemoglobin A1c above. We had a discussion today about the likely diagnosis and its natural history, physical exam and imaging findings, as well as various treatment options in detail. Surgically, we again discussed the possible revision right subtalar fusion with bone grafting (possible subtalar fusion nail with distal tibial autograft plus PDGF). At this point, we discussed obtaining a CT scan to evaluate his healing prior to proceeding with anything surgical, and we again discussed a possible future total ankle replacement. Orders/referrals were placed as below at today's visit. He may continue to use heel cups, and perform home exercises/physical therapy. He will continue to use his bone stimulator daily. He reports that he has not been smoking. In order to know exactly how to proceed surgically, a CT was ordered today for preoperative planning and to evaluate his subtalar joint healing. This is medically necessary to evaluate the exact bony alignment/architecture. All questions were answered and the above plan was agreed upon. The patient will return to clinic after the CT scan has been obtained without x-rays.   At his next visit, we will review his CT scan, possibly consider surgery as above. At the patient's next visit, depending on how the patient is doing and/or new imaging/labs results, we may consider the following options:    []  Orthotic (OTC)     []  Orthotic (custom)          []  Rocker bottom shoes     []  Brace (OTC)        []  Brace (custom)             []  CAM boot        []  Night splint         []  Heel cups        []  Strap      []  Toe sleeves/splints    []  PT:                     []  Wean out of immobilization   []  Advance activity       []  Topical               []  NSAIDs          []  Margarita         []  Referral:         []  Stress xrays       []  CT         []  MRI        []  Injection:         []  Consider OR      []  Pick OR date    No follow-ups on file. No orders of the defined types were placed in this encounter. No orders of the defined types were placed in this encounter. Soni Woodard MD  Orthopedic Surgery        Please excuse any typos/errors, as this note was created with the assistance of voice recognition software. While intending to generate a document that actually reflects the content of the visit, the document can still have some errors including those of syntax and sound-a-like substitutions which may escape proof reading. In such instances, actual meaning can be extrapolated by context.

## 2021-06-11 ENCOUNTER — HOSPITAL ENCOUNTER (OUTPATIENT)
Dept: CT IMAGING | Age: 66
Discharge: HOME | End: 2021-06-13
Payer: COMMERCIAL

## 2021-06-11 DIAGNOSIS — Z96.9 RETAINED ORTHOPEDIC HARDWARE: ICD-10-CM

## 2021-06-11 DIAGNOSIS — M19.079 ARTHRITIS OF SUBTALAR JOINT: ICD-10-CM

## 2021-06-11 PROCEDURE — 73700 CT LOWER EXTREMITY W/O DYE: CPT

## 2021-06-17 NOTE — DISCHARGE SUMMARY
[facility]               [place] [address]              Phone: [phone]              Fax: [phone]      Physical Therapy Discharge Note    Date: 2021      Patient: Mark Anglin  : 1955  MRN: [id number]    Physician: Dr. Joseph Garcia MD                                       Insurance: Worker's Comp, 12 additional Vs through 21  Diagnosis: M19.171 - Secondary localized osteoarthritis of right ankle and foot                Rehab Codes: M19.171, M25.571, M79.671, R26.2, M62.81             Onset Date: surgery 2020                                   Next Dr. Mary Rodriguez: 2021  Visit# / total visits:       Cancels/No Shows: 0/0  Date of initial visit: 2021                Date of final visit: 2021      Subjective:  Pt demonstrated significant progression in ROM, strength and improved gait mechanics during PT intervention following R ankle surgery performed by Dr. Linh Pollard on 2020. Pt reporting minimal pain levels at this time, increasing most with increased ambulation distance; however, pt reports being able to do more throughout his day and being able to walk more. Pt continues to be most challenged with stair climbing requiring upper extremity assist. Good prognosis with continuation of home stretching and strengthening exercises.      Objective:  Reassessed by primary PT 21 ROM  ° A/P STRENGTH     Left Right Left Right   Hip Flex     5 5   Ankle PF 28 25 5 5   DF  (knee straight) 15 20 5 5   DF   (knee flexed) 20 15 5 --   Inv 42 8 5 4+   Ever 10 5 5 4+   1st MTP DF           1st MTP PF                              Assessment:  STG: (to be met in 8 treatments) Goals addressed by evaluating PT  1. ? Pain: Decrease pain levels to < 2/10 with progression of strengthening and WBing exercises throughout therapy.  MET 3/4/21  2. ? ROM: Pt will demonstrate full R ankle ROM per fusion protocol in order to Other:             Game Ready    Discharge Status:     [] Pt recovered from conditions. Treatment goals were met. [x] Pt received maximum benefit. No further therapy indicated at this time. [x] Pt to continue exercise/home instructions independently. [] Therapy interrupted due to:    [] Pt has 2 or more no shows/cancels, is discontinued per our policy. [] Pt has completed prescribed number of treatment sessions. [] Other:         Electronically signed by Will Rosenbaum PT on 6/17/2021 at 4:48 PM      If you have any questions or concerns, please don't hesitate to call.   Thank you for your referral.

## 2021-06-18 ENCOUNTER — OFFICE VISIT (OUTPATIENT)
Dept: ORTHOPEDIC SURGERY | Age: 66
End: 2021-06-18
Payer: COMMERCIAL

## 2021-06-18 VITALS — HEIGHT: 69 IN | BODY MASS INDEX: 41.32 KG/M2 | WEIGHT: 279 LBS | RESPIRATION RATE: 12 BRPM

## 2021-06-18 DIAGNOSIS — M19.079 ARTHRITIS OF SUBTALAR JOINT: Primary | ICD-10-CM

## 2021-06-18 DIAGNOSIS — M25.871 IMPINGEMENT SYNDROME OF RIGHT ANKLE: ICD-10-CM

## 2021-06-18 DIAGNOSIS — M19.271 SECONDARY LOCALIZED OSTEOARTHROSIS OF RIGHT ANKLE AND FOOT: ICD-10-CM

## 2021-06-18 PROCEDURE — 99214 OFFICE O/P EST MOD 30 MIN: CPT | Performed by: ORTHOPAEDIC SURGERY

## 2021-06-18 NOTE — PROGRESS NOTES
[facility]  Replaced by [facility] Lisa Cardenas  [address] [facility] [address]  Dept: [phone]    Ambulatory Orthopedic Follow Up Visit    Preoperative Diagnosis:   1. 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 Right ankle equinus contracture  3. CAD with h/o Afib s/p ablation + pacemaker  4. Diabetes  5. H/o tobacco use  6. Body mass index is 40.61 kg/m².     Postoperative Diagnosis:   1. Same as above     Procedures Performed:  (11/12/2020)  1. Right foot subtalar fusion   2. Right percutaneous tendoachilles lengthening, performed through separate incision       CHIEF COMPLAINT:    Chief Complaint   Patient presents with    Foot Pain     Right         HISTORY OF PRESENT ILLNESS:       He is a 77 y.o. male, seen again today in the office for follow up of the above problem with a history of pain at the above location. Since being seen last, the patient is doing better. At today's visit, he is using no brace or assistive device. He does report some plantar heel pain worse with ambulation, but denies any lateral hindfoot pain. INTERVAL HISTORY 6/1/2021:  He is seen again today in the office for follow up of a previous issue (as above). Since being seen last, the patient is doing better. At today's visit, he is not using a brace or assistive device. History is obtained today from:   [x]  the patient     []  EMR     []  one family member/friend    []  multiple family members/friends    []  other:      Overall, he reports that his pain is about the same at the end of the day as it was before surgery, however he reports that he is able to do more throughout the day compared to before surgery. INTERVAL HISTORY 6/18/2021:  He is seen again today in the office for follow up of imaging as below. Since being seen last, the patient is doing better.  At today's visit, he is using no brace/assistive device. History is obtained today from:   [x]  the patient     [x]  EMR     []  one family member/friend    []  multiple family members/friends    []  other:        REVIEW OF SYSTEMS:   Constitutional: Negative for fever. HENT: Negative for tinnitus. Eyes: Negative for pain. Respiratory: Negative for shortness of breath. Cardiovascular: Negative for chest pain. Gastrointestinal: Negative for abdominal pain. Genitourinary: Negative for dysuria. Skin: Negative for rash. Neurological: Negative for headaches. Hematological: Does not bruise/bleed easily. Musculoskeletal: See HPI for pertinent positives     Past Medical History:    He  has a past medical history of Allergic rhinitis, Anxiety, Arrhythmia, Arthritis, Atrial fibrillation ([address] Benign hypertension (5/26/2015), Blind right eye, Caffeine use, Cough, Depression, Diabetes mellitus ([address] GERD (gastroesophageal reflux disease), Glaucoma, Hyperlipidemia LDL goal < 100 (5/26/2015), Hypertrophy of prostate with urinary obstruction and other lower urinary tract symptoms (LUTS), Morbid obesity ([address] (5/26/2015), Mumps, Unspecified sleep apnea, and Wears glasses. Past Surgical History:    He  has a past surgical history that includes Vasectomy; Cystocopy; Cardioversion (2012 ?); Colonoscopy; Elbow surgery (Right, 1967); Total knee arthroplasty (Right, 2015); Corneal transplant (Right, multiple); Foot surgery (Right, 07/14/2016); Ankle arthroscopy (Right, 02/23/2017); Ankle arthroscopy (Right, 2/23/2017); pacemaker placement (12/23/2016); and Ankle surgery (Right, 11/12/2020).      Current Medications:     Current Outpatient Medications:     aspirin 81 MG EC tablet, Take 81 mg by mouth daily, Disp: , Rfl:     meloxicam (MOBIC) 15 MG tablet, , Disp: , Rfl:     SITagliptin-metFORMIN (JANUMET XR)  MG TB24 per extended release tablet, Take 1 tablet by mouth daily, Disp: 90 tablet, Rfl: 3    pantoprazole (PROTONIX) 40 MG tablet, TAKE 1 TABLET DAILY, Disp: 90 tablet, Rfl: 3    atorvastatin (LIPITOR) 80 MG tablet, Take 1 tablet by mouth daily, Disp: 90 tablet, Rfl: 3    Alcohol Swabs (ALCOHOL PREP) 70 % PADS, Check glucose AC BID, dx E11.9, may sub covered product, Disp: 200 each, Rfl: 11    blood glucose test strips (ASCENSIA AUTODISC VI;ONE TOUCH ULTRA TEST VI) strip, Check glucose AC BID, dx E11.9, may sub covered product, Disp: 200 strip, Rfl: 11    Lancets MISC, 1 each by Does not apply route daily, Disp: 200 each, Rfl: 5    Lancet Devices (LANCING DEVICE) MISC, Check glucose AC BID, dx E11.9, may sub covered product, Disp: 1 each, Rfl: 0    Blood Glucose Monitoring Suppl (ONE TOUCH ULTRA 2) w/Device KIT, Check glucose AC BID, dx E11.9, may sub covered product, Disp: 1 kit, Rfl: 0    blood glucose test strips (ACCU-CHEK GUIDE) strip, CHECK TWO TIMES A DAY., Disp: 200 strip, Rfl: 3    metoprolol succinate (TOPROL XL) 25 MG extended release tablet, Take 1 tablet by mouth daily, Disp: 90 tablet, Rfl: 3    hydroCHLOROthiazide (HYDRODIURIL) 25 MG tablet, TAKE 1 TABLET DAILY, Disp: 90 tablet, Rfl: 3    telmisartan (MICARDIS) 40 MG tablet, TAKE 1 TABLET DAILY (Patient taking differently: nightly ), Disp: 90 tablet, Rfl: 3    erythromycin (ROMYCIN) 5 MG/GM ophthalmic ointment, , Disp: , Rfl:     B Complex Vitamins (VITAMIN-B COMPLEX) TABS, Take 1 tablet by mouth, Disp: , Rfl:     prednisoLONE acetate (PRED FORTE) 1 % ophthalmic suspension, Place 1 drop into the right eye nightly , Disp: , Rfl:     Multiple Vitamins-Minerals (THERAPEUTIC MULTIVITAMIN-MINERALS) tablet, Take 1 tablet by mouth daily, Disp: , Rfl:     sertraline (ZOLOFT) 100 MG tablet, Take 100 mg by mouth nightly Indications: Depression , Disp: , Rfl:     loratadine (CLARITIN) 10 MG tablet, Take 1 tablet by mouth daily (Patient taking differently: Take 10 mg by mouth daily as needed prn), Disp: 30 tablet, Rfl: 11    fluticasone (FLONASE) 50 MCG/ACT nasal spray, 2 sprays by Nasal route daily (Patient taking differently: 2 sprays by Nasal route nightly as needed ), Disp: 1 Bottle, Rfl: 11     Allergies: Other, Penicillins, Sulfa antibiotics, Tetanus immune globulin, Tetanus toxoids, and Timolol maleate    Family History:  family history includes Asthma in his sister; Cancer in his mother and sister; Heart Attack in his father; Heart Disease in his brother and another family member; High Blood Pressure in his father; Kidney Cancer in his father. Social History:   Social History     Occupational History    Occupation: Retired    Tobacco Use    Smoking status: Former Smoker     Packs/day: 1.50     Years: 20.00     Pack years: 30.00     Types: Cigarettes     Start date:      Quit date:      Years since quittin.4    Smokeless tobacco: Former User    Tobacco comment: quit    Vaping Use    Vaping Use: Never used   Substance and Sexual Activity    Alcohol use: No     Alcohol/week: 0.0 standard drinks    Drug use: No    Sexual activity: Yes     Partners: Female       OBJECTIVE:  Resp 12   Ht 5' 9\" (1.753 m)   Wt 279 lb (126.6 kg)   BMI 41.20 kg/m²    Psych: alert and oriented to person, time, and place  Cardio:  well perfused extremities  Resp:  normal respiratory effort  Skin:  no cyanosis  Hem/lymph:  no lymphedema  Neuro:  sensation to light touch unchanged since last visit  Musculoskeletal:    Incisions clean/dry/intact, no erythema/dehiscence/drainage  Sensation to light touch grossly intact throughout   Warm and well perfused  Grossly neurovascularly intact distally  No signs of infection  -No gross motion of the subtalar joint, painless ankle range of motion but decreased  -Tenderness at the sinus Tarsi and plantar heel  -No significant tenderness over the hardware      RADIOLOGY:   2021 Prior images reviewed for reference. CT images and radiology report reviewed, as below:      1.  Approximately 50% bony fusion of the subtalar joint surrounding the   screws.  Additional posterior bony bridging. FINDINGS:  Three weightbearing views (AP, Oblique, Lateral) of the right foot and 3 weightbearing views (AP, mortise, and lateral) of the right ankle were obtained in the office today and reviewed, revealing no acute fracture, dislocation, or radioopaque foreign body/tumor. Intact hardware status post subtalar fusion without evidence of loosening. No interval displacement. Questionable interval healing.     IMPRESSION: Status post subtalar fusion as above.     Electronically signed by Andrey Ruvalcaba MD      ASSESSMENT AND PLAN:  Body mass index is 41.2 kg/m². He is status post the above (on 11/12/2020), doing well overall, with approximately 50% of his subtalar joint united on CT scan from 6/11/2021. He does have some ipsilateral right ankle arthritis with anterior impingement. He has a history of right severe subtalar joint arthritis (status post attempted subtalar fusion with subsequent removal of hardware, with nonunion of his subtalar joint), along with ipsilateral ankle arthritis with anterior impingement, as sequelae of a fall from a ladder in 2005 while at work.  Virl Solders a somewhat complex past medical history including but not limited to the following:  Atrial fibrillation (status post ablation plus pacemaker; not taking a blood thinner), KIM, obesity (BMI greater than 40), non-insulin-dependent diabetes, and tobacco use (reports he smokes 1 cigar/day).   His most recent hemoglobin A1c above. We had a discussion today about the likely diagnosis and its natural history, physical exam and imaging findings, as well as various treatment options in detail. Surgically, I did not recommend a revision subtalar fusion at this time, as he does appear to be healing with time and the bone stimulator.   We did discuss a possible future ankle replacement, depending on his symptoms in the future. Orders/referrals were placed as below at today's visit. He will continue to use the bone stimulator daily, and continue avoiding tobacco use. He may continue heel cups as needed for pain and home exercises per physical therapy. I provided a prescription for Voltaren (4g TOPL q QID PRN pain). I recommend this over the use of oral NSAIDs because of his history of diabetes and subsequent risk of kidney damage. All questions were answered and the above plan was agreed upon. The patient will return to clinic in 3 months with right foot x-rays. At his next visit, we may consider a diagnostic/therapeutic right ankle injection. At the patient's next visit, depending on how the patient is doing and/or new imaging/labs results, we may consider the following options:    []  Orthotic (OTC)     []  Orthotic (custom)          []  Rocker bottom shoes     []  Brace (OTC)        []  Brace (custom)             []  CAM boot        []  Night splint         []  Heel cups        []  Strap      []  Toe sleeves/splints    []  PT:                     []  Wean out of immobilization   []  Advance activity       []  Topical               []  NSAIDs          []  Margarita         []  Referral:         []  Stress xrays       []  CT         []  MRI        []  Injection:         []  Consider OR      []  Pick OR date    Return in about 3 months (around 9/18/2021). No orders of the defined types were placed in this encounter. No orders of the defined types were placed in this encounter. Amarjit Lima MD  Orthopedic Surgery        Please excuse any typos/errors, as this note was created with the assistance of voice recognition software. While intending to generate a document that actually reflects the content of the visit, the document can still have some errors including those of syntax and sound-a-like substitutions which may escape proof reading. In such instances, actual meaning can be extrapolated by context.

## 2021-06-28 ENCOUNTER — OFFICE VISIT (OUTPATIENT)
Dept: PODIATRY | Age: 66
End: 2021-06-28
Payer: MEDICARE

## 2021-06-28 VITALS — BODY MASS INDEX: 39.99 KG/M2 | WEIGHT: 270 LBS | HEIGHT: 69 IN

## 2021-06-28 DIAGNOSIS — E11.51 TYPE 2 DIABETES MELLITUS WITH PERIPHERAL VASCULAR DISEASE (HCC): ICD-10-CM

## 2021-06-28 DIAGNOSIS — B35.1 ONYCHOMYCOSIS OF TOENAIL: Primary | ICD-10-CM

## 2021-06-28 DIAGNOSIS — M79.674 PAIN OF TOES OF BOTH FEET: ICD-10-CM

## 2021-06-28 DIAGNOSIS — M79.675 PAIN OF TOES OF BOTH FEET: ICD-10-CM

## 2021-06-28 DIAGNOSIS — E11.42 DIABETIC POLYNEUROPATHY ASSOCIATED WITH TYPE 2 DIABETES MELLITUS (HCC): ICD-10-CM

## 2021-06-28 PROCEDURE — 99203 OFFICE O/P NEW LOW 30 MIN: CPT | Performed by: PODIATRIST

## 2021-06-28 PROCEDURE — 3051F HG A1C>EQUAL 7.0%<8.0%: CPT | Performed by: PODIATRIST

## 2021-06-28 PROCEDURE — 11721 DEBRIDE NAIL 6 OR MORE: CPT | Performed by: PODIATRIST

## 2021-06-28 ASSESSMENT — ENCOUNTER SYMPTOMS
SHORTNESS OF BREATH: 0
BACK PAIN: 0
COLOR CHANGE: 0
NAUSEA: 0
DIARRHEA: 0

## 2021-06-28 NOTE — PROGRESS NOTES
Lydia Read is a 77 y.o. male who presents to the office today with chief complaint of request for a diabetic foot exam.  Chief Complaint   Patient presents with    Nail Problem     left hallux     Diabetes     last blood sugar 180/ a1c 7.1   Symptoms began about 1 year(s) ago. Patient denies injury to the feet. Patient states that pain to the left great toenail. Patient states that he thinks it might be ingrown. Patient states that the toe was very painful, but it has decreased. Pain is rated 4 out of 10 at it's worst and is described as intermittent. Treatments prior to today's visit include: None. Allergies   Allergen Reactions    Other Itching and Other (See Comments)     EKG patches-- red spot lasting for a week    Penicillins Hives and Other (See Comments)     Other reaction(s): Intolerance-unknown  Other reaction(s): Unknown    Sulfa Antibiotics Hives     Other reaction(s): Intolerance-unknown    Tetanus Immune Globulin Hives     Other reaction(s): Intolerance-unknown    Tetanus Toxoids Hives    Timolol Maleate Swelling     Swelling of eyes       Past Medical History:   Diagnosis Date    Allergic rhinitis     Anxiety     Arrhythmia     Arthritis     Atrial fibrillation (HCC)     on Pacemaker, No Blood Thinner, 2 Ablation    Benign hypertension 5/26/2015    Blind right eye     Caffeine use     3 cups coffee day    Cough     Clear sputum, due to sinus drainage per pt.  Depression     Diabetes mellitus ([address]     GERD (gastroesophageal reflux disease)     Glaucoma     Hyperlipidemia LDL goal < 100 5/26/2015    Hypertrophy of prostate with urinary obstruction and other lower urinary tract symptoms (LUTS)     Morbid obesity ([address] 5/26/2015    Mumps     as a child    Unspecified sleep apnea     bi pap use    Wears glasses        Prior to Admission medications    Medication Sig Start Date End Date Taking?  Authorizing Provider   aspirin 81 MG EC tablet Take 81 mg by mouth daily MD   Multiple Vitamins-Minerals (THERAPEUTIC MULTIVITAMIN-MINERALS) tablet Take 1 tablet by mouth daily   Yes Historical Provider, MD   sertraline (ZOLOFT) 100 MG tablet Take 100 mg by mouth nightly Indications: Depression    Yes Historical Provider, MD   loratadine (CLARITIN) 10 MG tablet Take 1 tablet by mouth daily  Patient taking differently: Take 10 mg by mouth daily as needed prn 4/24/15  Yes Luc Brice MD   fluticasone [facility]) 50 MCG/ACT nasal spray 2 sprays by Nasal route daily  Patient taking differently: 2 sprays by Nasal route nightly as needed  4/24/15  Yes Luc Brice MD       Past Surgical History:   Procedure Laterality Date    ANKLE ARTHROSCOPY Right 02/23/2017    WITH DEBRIDEMENT     ANKLE ARTHROSCOPY Right 2/23/2017    ANKLE ARTHROSCOPY WITH DEBRIDEMENT  performed by Mishel Lopez MD at [address] Right 11/12/2020    Right foot subtalar fusion,Right percutaneous tendoachilles lengthening, performed through separate incision performed by See Triana MD at [address] [facility]  2012 ?     X2, [address] Right multiple    corneal transplant x3    CYSTOSCOPY      TURBT 2010    ELBOW SURGERY Right 1967    FOOT SURGERY Right 07/14/2016    remove hardware--2 screws    PACEMAKER PLACEMENT  12/23/2016    [employer], [phone], Dr. Claudean Auer Right 2015    VASECTOMY         Family History   Problem Relation Age of Onset    Asthma Sister     Heart Disease Other         paternal history    Cancer Mother         melanoma    Kidney Cancer Father     Heart Attack Father     High Blood Pressure Father     Heart Disease Brother     Cancer Sister        Social History     Tobacco Use    Smoking status: Former Smoker     Packs/day: 1.50     Years: 20.00     Pack years: 30.00     Types: Cigarettes     Start date: 0     Quit date: 1997     Years since quitting: 24.5    Smokeless tobacco: Former User    Tobacco comment: quit 2000   Substance Use Topics    Alcohol use: No     Alcohol/week: 0.0 standard drinks       Review of Systems   Constitutional: Negative for activity change, appetite change, chills, diaphoresis, fatigue and fever. Respiratory: Negative for shortness of breath. Cardiovascular: Negative for leg swelling. Gastrointestinal: Negative for diarrhea and nausea. Endocrine: Negative for cold intolerance, heat intolerance and polyuria. Musculoskeletal: Positive for arthralgias. Negative for back pain, gait problem, joint swelling and myalgias. Skin: Negative for color change, pallor, rash and wound. Allergic/Immunologic: Negative for environmental allergies and food allergies. Neurological: Negative for dizziness, weakness, light-headedness and numbness. Hematological: Does not bruise/bleed easily. Psychiatric/Behavioral: Negative for behavioral problems, confusion and self-injury. The patient is not nervous/anxious. Vitals: There were no vitals filed for this visit. General: AAO x 3 in NAD. Integument: There are no rashes, ulcers, or breaks in the skin noted to the bilateral lower extremities. There is no induration, subcutaneous nodules, or tightening of the skin noted to the bilateral.     Toenails 1-5 of the right foot do present with thickness, discoloration, brittleness, subungual debris. Toenails 1-5 of the left foot do present with thickness, discoloration, brittleness, subungual debris. There is incurvation noted to the medial border of the left hallux toenail. There is pain with palpation to the medial border of the left hallux. There is no erythema, calor, drainage, or malodor noted. There is pain with palpation of toenails 1-5 of the right foot and 1-5 of the left foot. Interdigital maceration absent to web spaces 1-4, Bilateral.     There are no preulcerative lesions noted to the right foot. rearfoot eversion, medial prominence of the talar head, loss of the medial longitudinal arch height, and too many toes sign bilaterally. The lesser digits of the right foot are contracted. The lesser digits of the left foot are contracted. There is no prominence noted to the first metatarsal head without abduction of the hallux of the right foot. There is no prominence noted to the first metatarsal head without abduction of the hallux of the left foot. Shoe examination was performed. Biomechanical Exam: normal bilaterally. Asessment: Patient is a 77 y.o. male with:    Diagnosis Orders   1. Onychomycosis of toenail  OR DEBRIDEMENT OF NAIL(S), 1-5    HM DIABETES FOOT EXAM   2. Pain of toes of both feet  OR DEBRIDEMENT OF NAIL(S), 1-5    HM DIABETES FOOT EXAM   3. Type 2 diabetes mellitus with peripheral vascular disease (HCC)  OR DEBRIDEMENT OF NAIL(S), 1-5    HM DIABETES FOOT EXAM   4. Diabetic polyneuropathy associated with type 2 diabetes mellitus ([facility] [address] [facility]  Amb External Referral For Orthotics    OR DEBRIDEMENT OF NAIL(S), 1-5    HM DIABETES FOOT EXAM       Plan:  1. Clinical evaluation of the patient. 2. Toenails 1-5 of the right foot and 1-5 of the left foot were debrided in length and thickness using a nail nipper and a . Patient educated on proper diabetic foot care. Patient informed that he qualifies for diabetic shoes and he was given an order for these today. 3. Contact office with any questions/problems/concerns. Return in about 9 weeks (around 8/30/2021) for At risk diabetic foot care.    6/28/2021      Marimar Trevizo DPM

## 2021-08-31 NOTE — TELEPHONE ENCOUNTER
[facility] CLINICAL PHARMACY NOTE: MEDICATION REVIEW    Ghanshyam Medina is a 74 y.o. male identified as being eligible for Medication Therapy Management (MTM) services: comprehensive medication review (CMR). Spoke with the patient    OBJECTIVE:  Allergies   Allergen Reactions    Other Itching and Other (See Comments)     EKG patches-- red spot lasting for a week    Penicillins Hives and Other (See Comments)     Other reaction(s): Intolerance-unknown  Other reaction(s): Unknown    Sulfa Antibiotics Hives     Other reaction(s): Intolerance-unknown    Tetanus Immune Globulin Hives     Other reaction(s):  Intolerance-unknown    Tetanus Toxoids Hives    Timolol Maleate Swelling     Swelling of eyes     Current Medications:  Medication Sig    telmisartan (MICARDIS) 40 MG tablet Take 1 tablet by mouth nightly    meloxicam (MOBIC) 15 MG tablet TAKE 1 TABLET DAILY    metoprolol succinate (TOPROL XL) 25 MG extended release tablet TAKE 1 TABLET DAILY    blood glucose test strips (ASCENSIA AUTODISC VI;ONE TOUCH ULTRA TEST VI) strip Check glucose AC BID, dx E11.9, AccuChek brand please    Lancets MISC 1 each by Does not apply route daily Check glucose AC BID, dx E11.9, accuchek brand only    hydroCHLOROthiazide (HYDRODIURIL) 25 MG tablet TAKE 1 TABLET DAILY    atorvastatin (LIPITOR) 80 MG tablet Take 1 tablet by mouth daily    aspirin 81 MG EC tablet Take 81 mg by mouth daily    SITagliptin-metFORMIN (JANUMET XR)  MG TB24 per extended release tablet Take 1 tablet by mouth daily  - does get some GI effects    pantoprazole (PROTONIX) 40 MG tablet TAKE 1 TABLET DAILY    Alcohol Swabs (ALCOHOL PREP) 70 % PADS Check glucose AC BID, dx E11.9, may sub covered product    Lancets MISC 1 each by Does not apply route daily    Lancet Devices (LANCING DEVICE) MISC Check glucose AC BID, dx E11.9, may sub covered product    Blood Glucose Monitoring Suppl (ONE TOUCH ULTRA 2) w/Device KIT Check glucose AC BID, dx E11.9, may sub covered product    blood glucose test strips (ACCU-CHEK GUIDE) strip CHECK TWO TIMES A DAY.     erythromycin (ROMYCIN) 5 MG/GM ophthalmic ointment No longer using    B Complex Vitamins (VITAMIN-B COMPLEX) TABS Take 1 tablet by mouth    prednisoLONE acetate (PRED FORTE) 1 % ophthalmic suspension Place 1 drop into the right eye nightly     Multiple Vitamins-Minerals (THERAPEUTIC MULTIVITAMIN-MINERALS) tablet Take 1 tablet by mouth daily  - b complex not multi    sertraline (ZOLOFT) 100 MG tablet Take 100 mg by mouth nightly Indications: Depression     loratadine (CLARITIN) 10 MG tablet Take 1 tablet by mouth daily (Patient taking differently: Take 10 mg by mouth daily as needed prn)  - prn    fluticasone (FLONASE) 50 MCG/ACT nasal spray 2 sprays by Nasal route daily (Patient taking differently: 2 sprays by Nasal route nightly as needed )  -prn       Medication Dose Route Frequency Provider    clindamycin (CLEOCIN) 900 mg in dextrose 5% 50 mL IVPB  900 mg IntraVENous Once Jose Cruz Bliss MD    lactated ringers infusion 1,000 mL  1,000 mL IntraVENous Continuous Mark Gill MD    sodium chloride flush 0.9 % injection 10 mL  10 mL IntraVENous 2 times per day Thien Salas MD    sodium chloride flush 0.9 % injection 10 mL  10 mL IntraVENous PRN Thien Salas MD    fentaNYL (SUBLIMAZE) injection 25 mcg  25 mcg IntraVENous Q5 Min PRN Thien Salas MD    ropivacaine 0.2% (NAROPIN) elastomeric infusion 550 mL  550 mL Infiltration Continuous Thien Salas MD       Additional Medications:  · anders 128 daily prn    Pertinant Labs/Vitals:  BP Readings from Last 3 Encounters:   08/12/21 116/74   06/10/21 128/76   02/15/21 134/78     No results found for: LABMICR, [id number]  Lab Results   Component Value Date    LABA1C 7.1 06/10/2021     Lab Results   Component Value Date    CHOL 88 06/17/2021    TRIG 220 06/17/2021    HDL 30 (A) 06/17/2021    LDLCALC 14 06/17/2021     ALT   Date Value Ref Range Status   05/29/2015 54 U/L      AST   Date Value Ref Range Status   05/29/2015 31 U/L        Lab Results   Component Value Date    CREATININE 1.03 10/27/2020     CrCl cannot be calculated (Patient's most recent lab result is older than the maximum 120 days allowed. ). eGFR: > 60 mL/min/1.73 m^2    Immunizations:  Immunization History   Administered Date(s) Administered    COVID-19, Pfizer, PF, 30mcg/0.3mL 02/17/2021, 03/10/2021    Influenza Vaccine, unspecified formulation 11/21/2016    Influenza Virus Vaccine 10/25/2018, 10/14/2020    Influenza, Quadv, IM, (6 mo and older Fluzone, Flulaval, Fluarix and 3 yrs and older Afluria) 11/10/2016, 09/07/2017    Influenza, Quadv, IM, PF (6 mo and older Fluzone, Flulaval, Fluarix, and 3 yrs and older Afluria) 09/25/2019    Influenza, Quadv, adjuvanted, 65 yrs +, IM, PF (Fluad) 10/14/2020    Pneumococcal Conjugate 13-valent ([id number]) 12/11/2018    Pneumococcal Polysaccharide ([id number]) 08/11/2017    Yellow Fever (YF-Vax) 03/19/2013        ASSESSMENT:  Identified Medication Interactions: The following clinically significant interactions were identified via Evoz Interaction Analysis as category D or higher: mobic and ASA- discussed increased risk of GI bleeding. . This interaction was discussed with the patient. Patient needs the mobic for his arthritis and is aware of this interaction. Patient does take protonix daily. Renal Dosing: No renal adjustments necessary. Immunizations Overdue: shingles  Blood Pressure Goal: does get his BP checked twice a month at [facility] and it has been at or below target. Hyperlipidemia Goal: Patient has a 10-yr ASCVD risk of <7.5% with DM and is therefore a candidate for moderate-intensity statin therapy based on updated guidelines. The ASCVD Risk score (Sondra Cooper, et al., 2013) failed to calculate for the following reasons:     The valid total cholesterol range is 130 to 320 mg/dL    Glycemic Goals: A1c less than 7%. Stable and at blood glucose goal. Reviewed: Medication timing     Medication adherence: denies issues. Refills on time per outside med history. Diabetes: Avg 150 in AM, has enough supplies to check sugars. ADA diet discussed. PLAN:  Medication reconciliation completed. Verified all current medications and reviewed doses and frequency, along with indication for each. Medication list updated as appropriate.     Recommendation(s) to provider:   None at this time    Education to patient:   DM   Drug interaction      Thank you,    Kaylyn Aragon, PharmD, [address] Pharmacist  Department: [phone]  ===================================  For Pharmacy Admin Tracking Only     Gap Closed?: Yes    Time Spent (min): 45

## 2021-09-01 ENCOUNTER — SCHEDULED TELEPHONE ENCOUNTER (OUTPATIENT)
Dept: PHARMACY | Facility: CLINIC | Age: 66
End: 2021-09-01

## 2021-09-01 RX ORDER — SODIUM CHLORIDE 5 %
1 OINTMENT (GRAM) OPHTHALMIC (EYE) DAILY PRN
COMMUNITY

## 2021-09-01 RX ORDER — LORATADINE 10 MG/1
10 TABLET ORAL DAILY PRN
COMMUNITY

## 2021-09-01 RX ORDER — CLINDAMYCIN HYDROCHLORIDE 150 MG/1
600 CAPSULE ORAL
COMMUNITY
End: 2022-09-13

## 2021-09-01 RX ORDER — FLUTICASONE PROPIONATE 50 MCG
2 SPRAY, SUSPENSION (ML) NASAL NIGHTLY PRN
COMMUNITY

## 2021-09-07 ENCOUNTER — OFFICE VISIT (OUTPATIENT)
Dept: PODIATRY | Age: 66
End: 2021-09-07
Payer: MEDICARE

## 2021-09-07 VITALS — BODY MASS INDEX: 39.99 KG/M2 | WEIGHT: 270 LBS | HEIGHT: 69 IN

## 2021-09-07 DIAGNOSIS — E11.51 TYPE 2 DIABETES MELLITUS WITH PERIPHERAL VASCULAR DISEASE (HCC): ICD-10-CM

## 2021-09-07 DIAGNOSIS — M79.675 PAIN OF TOES OF BOTH FEET: ICD-10-CM

## 2021-09-07 DIAGNOSIS — E11.42 DIABETIC POLYNEUROPATHY ASSOCIATED WITH TYPE 2 DIABETES MELLITUS (HCC): ICD-10-CM

## 2021-09-07 DIAGNOSIS — B35.1 ONYCHOMYCOSIS OF TOENAIL: Primary | ICD-10-CM

## 2021-09-07 DIAGNOSIS — M79.674 PAIN OF TOES OF BOTH FEET: ICD-10-CM

## 2021-09-07 PROCEDURE — 11721 DEBRIDE NAIL 6 OR MORE: CPT | Performed by: PODIATRIST

## 2021-09-13 ASSESSMENT — ENCOUNTER SYMPTOMS
DIARRHEA: 0
SHORTNESS OF BREATH: 0
BACK PAIN: 0
COLOR CHANGE: 0
NAUSEA: 0

## 2021-09-13 NOTE — PROGRESS NOTES
SUBJECTIVE: Alexandra Dukes is a 74 y.o. male who returns to the office with chief complaint of painful fungal toenails. Patient relates toe nails are thickened/difficult to trim as well as painful with ambulation and with shoe gear. Chief Complaint   Patient presents with    Nail Problem     b/l nail trim, last seen Elvi Stone MD 8/12/21    Ingrown Toenail     left hallux    Diabetes     A1C 7.1     Review of Systems   Constitutional: Negative for activity change, appetite change, chills, diaphoresis, fatigue and fever. Respiratory: Negative for shortness of breath. Cardiovascular: Negative for leg swelling. Gastrointestinal: Negative for diarrhea and nausea. Endocrine: Negative for cold intolerance, heat intolerance and polyuria. Musculoskeletal: Positive for arthralgias. Negative for back pain, gait problem, joint swelling and myalgias. Skin: Negative for color change, pallor, rash and wound. Allergic/Immunologic: Negative for environmental allergies and food allergies. Neurological: Negative for dizziness, weakness, light-headedness and numbness. Hematological: Does not bruise/bleed easily. Psychiatric/Behavioral: Negative for behavioral problems, confusion and self-injury. The patient is not nervous/anxious. OBJECTIVE: Clinical evaluation of patient reveals nails 1,2,3,4,5 of the right foot and nails 1,2,3,4,5, of the left foot to present with thickness, elongation, discoloration, brittleness, and subungual debris. There was pain with palpation and debridement of the toenails of the bilateral feet. No open lesions noted to either foot today. The right DP pulse is palpable. The left DP pulse is not palpable. The right PT pulse is not palpable. The left PT pulse is palpable. Protective sensation is absent to the right plantar foot as noted with a 5.07 Newell-Michelle monofilament.    Protective sensation is absent to the left plantar foot as noted with a 5.07 [place]-Michelle monofilament. HbA1c: 7.1 %. Class A Findings (1 needed)   [] Non-traumatic amputation of foot or integral skeleton portion thereof. [] Q7.      Class B Findings (2 needed)   1. [x] Absent posterior tibial pulse   2. [x] Absent dorsalis pedis pulse   3. [] Advanced trophic changes; three of the following are required:   ·         [] hair growth (decrease or absence)   ·         [] nail changes (thickening)   ·         [] pigmentary changes (discoloration)   ·         [] skin texture (thin, shiny)   ·         [] skin color (rubor or redness)   [x] Q8.      Class C Findings (1 Class B, 2 Class C needed)   1. [] Claudication   2. [] Temperature changes   3. [] Edema   4. [] Paresthesia   5. [] Burning   [] Q9.     ASSESSMENT:    Diagnosis Orders   1. Onychomycosis of toenail  FL DEBRIDEMENT OF NAILS, 6 OR MORE    HM DIABETES FOOT EXAM   2. Pain of toes of both feet  FL DEBRIDEMENT OF NAILS, 6 OR MORE    HM DIABETES FOOT EXAM   3. Type 2 diabetes mellitus with peripheral vascular disease (HCC)  FL DEBRIDEMENT OF NAILS, 6 OR MORE    HM DIABETES FOOT EXAM   4. Diabetic polyneuropathy associated with type 2 diabetes mellitus (HCC)  FL DEBRIDEMENT OF NAILS, 6 OR MORE    HM DIABETES FOOT EXAM     PLAN: Toenails 1,2,3,4,5 of the right foot and 1,2,3,4,5 of the left foot were debrided in length and thickness using a nail nipper and a . Return in about 9 weeks (around 11/9/2021) for At risk diabetic foot care.    9/7/2021      Sharmila Aguilar DPM

## 2021-09-14 DIAGNOSIS — M79.671 RIGHT FOOT PAIN: Primary | ICD-10-CM

## 2021-09-20 ENCOUNTER — OFFICE VISIT (OUTPATIENT)
Dept: ORTHOPEDIC SURGERY | Age: 66
End: 2021-09-20
Payer: COMMERCIAL

## 2021-09-20 VITALS — BODY MASS INDEX: 39.99 KG/M2 | WEIGHT: 270 LBS | HEIGHT: 69 IN

## 2021-09-20 DIAGNOSIS — M19.079 ARTHRITIS OF SUBTALAR JOINT: Primary | ICD-10-CM

## 2021-09-20 DIAGNOSIS — M25.871 IMPINGEMENT SYNDROME OF RIGHT ANKLE: ICD-10-CM

## 2021-09-20 PROCEDURE — 99213 OFFICE O/P EST LOW 20 MIN: CPT | Performed by: ORTHOPAEDIC SURGERY

## 2021-09-20 NOTE — PROGRESS NOTES
[address] [address]  Dept: [phone]    Ambulatory Orthopedic Follow Up Visit    Preoperative Diagnosis:   1. 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 Right ankle equinus contracture  3. CAD with h/o Afib s/p ablation + pacemaker  4. Diabetes  5. H/o tobacco use  6. Body mass index is 40.61 kg/m².     Postoperative Diagnosis:   1. Same as above     Procedures Performed:  (11/12/2020)  1. Right foot subtalar fusion   2. Right percutaneous tendoachilles lengthening, performed through separate incision       CHIEF COMPLAINT:    Chief Complaint   Patient presents with    Foot Pain     right         HISTORY OF PRESENT ILLNESS:       He is a 74 y.o. male, seen again today in the office for follow up of the above problem with a history of pain at the above location. Since being seen last, the patient is doing better. At today's visit, he is using no brace or assistive device. He does report some plantar heel pain worse with ambulation, but denies any lateral hindfoot pain. INTERVAL HISTORY 6/1/2021:  He is seen again today in the office for follow up of a previous issue (as above). Since being seen last, the patient is doing better. At today's visit, he is not using a brace or assistive device. History is obtained today from:   [x]  the patient     []  EMR     []  one family member/friend    []  multiple family members/friends    []  other:      Overall, he reports that his pain is about the same at the end of the day as it was before surgery, however he reports that he is able to do more throughout the day compared to before surgery. INTERVAL HISTORY 6/18/2021:  He is seen again today in the office for follow up of imaging as below. Since being seen last, the patient is doing better.  At today's visit, he is using no brace/assistive device. History is obtained today from:   [x]  the patient     [x]  EMR     []  one family member/friend    []  multiple family members/friends    []  other:      INTERVAL HISTORY 9/20/2021:  He is seen again today in the office for follow up of a previous issue (as above). Since being seen last, the patient is doing better. At today's visit, he is not using a brace or assistive device. History is obtained today from:   [x]  the patient     []  EMR     []  one family member/friend    []  multiple family members/friends    []  other:          REVIEW OF SYSTEMS:   Constitutional: Negative for fever. HENT: Negative for tinnitus. Eyes: Negative for pain. Respiratory: Negative for shortness of breath. Cardiovascular: Negative for chest pain. Gastrointestinal: Negative for abdominal pain. Genitourinary: Negative for dysuria. Skin: Negative for rash. Neurological: Negative for headaches. Hematological: Does not bruise/bleed easily. Musculoskeletal: See HPI for pertinent positives     Past Medical History:    He  has a past medical history of Allergic rhinitis, Anxiety, Arrhythmia, Arthritis, Atrial fibrillation ([address] Benign hypertension (5/26/2015), Blind right eye, Caffeine use, Cough, Depression, Diabetes mellitus ([address] GERD (gastroesophageal reflux disease), Glaucoma, Hyperlipidemia LDL goal < 100 (5/26/2015), Hypertrophy of prostate with urinary obstruction and other lower urinary tract symptoms (LUTS), Morbid obesity ([address] (5/26/2015), Mumps, Unspecified sleep apnea, and Wears glasses. Past Surgical History:    He  has a past surgical history that includes Vasectomy; Cystocopy; Cardioversion (2012 ?); Colonoscopy; Elbow surgery (Right, 1967); Total knee arthroplasty (Right, 2015); Corneal transplant (Right, multiple); Foot surgery (Right, 07/14/2016); Ankle arthroscopy (Right, 02/23/2017);  Ankle arthroscopy (Right, 2/23/2017); pacemaker placement (12/23/2016); and Ankle surgery (Right, 11/12/2020).      Current Medications:     Current Outpatient Medications:     clindamycin (CLEOCIN) 150 MG capsule, Take 600 mg by mouth once as needed (take 1 hour before dental procedures), Disp: , Rfl:     loratadine (CLARITIN) 10 MG tablet, Take 10 mg by mouth daily as needed (seasonal allergies), Disp: , Rfl:     fluticasone (FLONASE) 50 MCG/ACT nasal spray, 2 sprays by Each Nostril route nightly as needed for Rhinitis or Allergies, Disp: , Rfl:     sodium chloride (ANISHA 128) 5 % ophthalmic ointment, Place 1 drop into both eyes daily as needed (irritated eyes), Disp: , Rfl:     telmisartan (MICARDIS) 40 MG tablet, Take 1 tablet by mouth nightly, Disp: 90 tablet, Rfl: 3    meloxicam (MOBIC) 15 MG tablet, TAKE 1 TABLET DAILY, Disp: 90 tablet, Rfl: 3    metoprolol succinate (TOPROL XL) 25 MG extended release tablet, TAKE 1 TABLET DAILY, Disp: 90 tablet, Rfl: 3    blood glucose test strips (ASCENSIA AUTODISC VI;ONE TOUCH ULTRA TEST VI) strip, Check glucose AC BID, dx E11.9, AccuChek brand please, Disp: 200 strip, Rfl: 11    Lancets MISC, 1 each by Does not apply route daily Check glucose AC BID, dx E11.9, accuchek brand only, Disp: 200 each, Rfl: 5    hydroCHLOROthiazide (HYDRODIURIL) 25 MG tablet, TAKE 1 TABLET DAILY, Disp: 90 tablet, Rfl: 3    atorvastatin (LIPITOR) 80 MG tablet, Take 1 tablet by mouth daily, Disp: 90 tablet, Rfl: 3    aspirin 81 MG EC tablet, Take 81 mg by mouth daily, Disp: , Rfl:     SITagliptin-metFORMIN (JANUMET XR)  MG TB24 per extended release tablet, Take 1 tablet by mouth daily, Disp: 90 tablet, Rfl: 3    pantoprazole (PROTONIX) 40 MG tablet, TAKE 1 TABLET DAILY, Disp: 90 tablet, Rfl: 3    Alcohol Swabs (ALCOHOL PREP) 70 % PADS, Check glucose AC BID, dx E11.9, may sub covered product, Disp: 200 each, Rfl: 11    Lancet Devices (LANCING DEVICE) MISC, Check glucose AC BID, dx E11.9, may sub covered product, Disp: 1 each, Rfl: 0    B Complex Vitamins (VITAMIN-B COMPLEX) TABS, Take 1 tablet by mouth, Disp: , Rfl:     prednisoLONE acetate (PRED FORTE) 1 % ophthalmic suspension, Place 1 drop into the right eye nightly , Disp: , Rfl:     sertraline (ZOLOFT) 100 MG tablet, Take 100 mg by mouth nightly Indications: Depression , Disp: , Rfl:      Allergies: Other, Penicillins, Sulfa antibiotics, Tetanus immune globulin, Tetanus toxoids, and Timolol maleate    Family History:  family history includes Asthma in his sister; Cancer in his mother and sister; Heart Attack in his father; Heart Disease in his brother and another family member; High Blood Pressure in his father; Kidney Cancer in his father.     Social History:   Social History     Occupational History    Occupation: Retired    Tobacco Use    Smoking status: Former Smoker     Packs/day: 1.50     Years: 20.00     Pack years: 30.00     Types: Cigarettes     Start date:      Quit date:      Years since quittin.7    Smokeless tobacco: Former User    Tobacco comment: quit    Vaping Use    Vaping Use: Never used   Substance and Sexual Activity    Alcohol use: No     Alcohol/week: 0.0 standard drinks    Drug use: No    Sexual activity: Yes     Partners: Female       OBJECTIVE:  Ht 5' 9\" (1.753 m)   Wt 270 lb (122.5 kg)   BMI 39.87 kg/m²    Psych: alert and oriented to person, time, and place  Cardio:  well perfused extremities  Resp:  normal respiratory effort  Skin:  no cyanosis  Hem/lymph:  no lymphedema  Neuro:  sensation to light touch unchanged since last visit  Musculoskeletal:    Incisions clean/dry/intact, no erythema/dehiscence/drainage  Sensation to light touch grossly intact throughout   Warm and well perfused  Grossly neurovascularly intact distally  No signs of infection  -No gross motion of the subtalar joint, painless ankle range of motion but decreased  -Tenderness at the sinus Tarsi and plantar heel--improved  -No significant tenderness over the hardware      RADIOLOGY: 9/20/2021 FINDINGS:  Three weightbearing views (AP, Oblique, Lateral) of the right foot were obtained in the office today and reviewed, revealing no acute fracture, dislocation, or radioopaque foreign body/tumor. Intact hardware status post subtalar fusion without evidence of loosening. No interval displacement. Interval healing noted.     IMPRESSION: Status post subtalar fusion as above.     Electronically signed by Breonna Gonzalez,         CT images and radiology report reviewed, as below:      1. Approximately 50% bony fusion of the subtalar joint surrounding the   screws.  Additional posterior bony bridging. FINDINGS:  Three weightbearing views (AP, Oblique, Lateral) of the right foot and 3 weightbearing views (AP, mortise, and lateral) of the right ankle were obtained in the office today and reviewed, revealing no acute fracture, dislocation, or radioopaque foreign body/tumor. Intact hardware status post subtalar fusion without evidence of loosening. No interval displacement. Questionable interval healing.     IMPRESSION: Status post subtalar fusion as above.     Electronically signed by Breonna Gonzalez MD      ASSESSMENT AND PLAN:  Body mass index is 39.87 kg/m². He is status post the above (on 11/12/2020), doing well overall, with approximately 50% of his subtalar joint united on CT scan from 6/11/2021; he appears to have healed more radiographically since that time. He does have some ipsilateral right ankle arthritis with anterior impingement, which is currently not bothering him too significantly at today's visit.       He has a history of right severe subtalar joint arthritis (status post attempted subtalar fusion with subsequent removal of hardware, with nonunion of his subtalar joint), along with ipsilateral ankle arthritis with anterior impingement, as sequelae of a fall from a ladder in 2005 while at work.  Candice Stalling a somewhat complex past medical history including but not limited to the following:  Atrial fibrillation (status post ablation plus pacemaker; not taking a blood thinner), KIM, obesity (BMI greater than 40), non-insulin-dependent diabetes, and tobacco use (reports he smokes 1 cigar/day).   His most recent hemoglobin A1c above. We had a discussion today about the likely diagnosis and its natural history, physical exam and imaging findings, as well as various treatment options in detail. Surgically, I did not recommend any surgery at this time, as he does appear to be obtaining significant osseous union of his subtalar joint with a bone stimulator. We again discussed a possible future ankle replacement, depending on his clinical course. Orders/referrals were placed as below at today's visit. We discussed he has no specific restrictions. He may continue to advance his activity as tolerated. No immobilization is needed for stability at this time. He may continue weight bearing as tolerated. He may continue to use heel cups and Voltaren gel as needed. We discussed the possible ankle injection, however he has declined at this time. He will continue to use his bone stimulator. All questions were answered and the above plan was agreed upon. The patient will return to clinic in 6 months with repeat right foot and ankle x-rays, or sooner as needed. At his next visit, depending on how is doing, we may consider a right tibiotalar joint therapeutic/diagnostic injection.          At the patient's next visit, depending on how the patient is doing and/or new imaging/labs results, we may consider the following options:    []  Orthotic (OTC)     []  Orthotic (custom)          []  Rocker bottom shoes     []  Brace (OTC)        []  Brace (custom)             []  CAM boot        []  Night splint         []  Heel cups        []  Strap      []  Toe sleeves/splints    []  PT:                     []  Wean out of immobilization   []  Advance activity       []  Topical []  NSAIDs          []  Margarita         []  Referral:         []  Stress xrays       []  CT         []  MRI        []  Injection:         []  Consider OR      []  Pick OR date    No follow-ups on file. No orders of the defined types were placed in this encounter. No orders of the defined types were placed in this encounter. Carmen Cartagena MD  Orthopedic Surgery        Please excuse any typos/errors, as this note was created with the assistance of voice recognition software. While intending to generate a document that actually reflects the content of the visit, the document can still have some errors including those of syntax and sound-a-like substitutions which may escape proof reading. In such instances, actual meaning can be extrapolated by context.

## 2021-10-05 ENCOUNTER — OFFICE VISIT (OUTPATIENT)
Dept: PODIATRY | Age: 66
End: 2021-10-05
Payer: MEDICARE

## 2021-10-05 VITALS — WEIGHT: 270 LBS | HEIGHT: 69 IN | BODY MASS INDEX: 39.99 KG/M2

## 2021-10-05 DIAGNOSIS — L60.0 OC (ONYCHOCRYPTOSIS): ICD-10-CM

## 2021-10-05 DIAGNOSIS — L03.032 PARONYCHIA OF GREAT TOE OF LEFT FOOT: Primary | ICD-10-CM

## 2021-10-05 DIAGNOSIS — M79.675 PAIN IN TOE OF LEFT FOOT: ICD-10-CM

## 2021-10-05 PROCEDURE — 11750 EXCISION NAIL&NAIL MATRIX: CPT | Performed by: PODIATRIST

## 2021-10-05 PROCEDURE — 99213 OFFICE O/P EST LOW 20 MIN: CPT | Performed by: PODIATRIST

## 2021-10-05 ASSESSMENT — ENCOUNTER SYMPTOMS
BACK PAIN: 0
DIARRHEA: 0
NAUSEA: 0
SHORTNESS OF BREATH: 0
COLOR CHANGE: 0

## 2021-10-05 NOTE — PROGRESS NOTES
[facility]iat[facility]  Return Patient Progress Note    Subjective: Neda Walter 74 y.o. male that presents with chief complaint of ingrown nail to the left great toe. Chief Complaint   Patient presents with    Ingrown Toenail     left hallux    Patient states that this nail has been painful with shoe gear and pressure for a couple of weeks. Pain is rated 9 out of 10 and is described as intermittent. Patient denies any treatment for this prior to today. Review of Systems   Constitutional: Negative for activity change, appetite change, chills, diaphoresis, fatigue and fever. Respiratory: Negative for shortness of breath. Cardiovascular: Negative for leg swelling. Gastrointestinal: Negative for diarrhea and nausea. Endocrine: Negative for cold intolerance, heat intolerance and polyuria. Musculoskeletal: Positive for arthralgias. Negative for back pain, gait problem, joint swelling and myalgias. Skin: Negative for color change, pallor, rash and wound. Allergic/Immunologic: Negative for environmental allergies and food allergies. Neurological: Negative for dizziness, weakness, light-headedness and numbness. Hematological: Does not bruise/bleed easily. Psychiatric/Behavioral: Negative for behavioral problems, confusion and self-injury. The patient is not nervous/anxious. Objective: Clinical evaluation of the patient reveals ingrowth to the medial border of the left hallux toenail. There is pain with palpation to the medial border of the left hallux. There is edema and erythema noted to the left hallux. There is no calor, drainage, or malodor noted to the left hallux. Assessment:    Diagnosis Orders   1. Paronychia of great toe of left foot  SD REMOVAL OF NAIL BED   2. OC (onychocryptosis)  SD REMOVAL OF NAIL BED   3. Pain in toe of left foot  SD REMOVAL OF NAIL BED         Plan: 1. Clinical evaluation of the patient.  2. I recommended a partial nail avulsion with chemical matricectomy to the medial borders of the left hallux. A consent was signed and placed in the chart. The left hallux was anesthetized with 3 cc of 0.5% Marcaine plain. A tourniquet was placed at the base of the toe. The area was then prepped and draped in an aseptic manner. A hemostat was then utilized to elevate the medial and proximal skin folds away from the underlying nail plate border. The hemostat was then utilized to elevate the medial nail plate border away from the underlying nailbed. A [employer] blade was then utilized to excise this nail plate border. A hemostat was utilized to remove this nail plate border from the field. A curette was utilized to ensure that all nail remnants were removed. The nail matrix in this area was then treated with 3 applications of 46% phenol for 15 seconds each. The area was then irrigated with copious amounts of sterile saline solution. The wound was dressed with antibiotic oinment and a dry sterile dressing. The patient was given post-operative care and dressing instructions. The patient was encouraged to call or come in if any concerns arise. 3. Return in about 2 weeks (around 10/19/2021) for first post op matricectomy left hallux.    10/5/2021      Sheba Allison DPM

## 2021-10-19 ENCOUNTER — OFFICE VISIT (OUTPATIENT)
Dept: PODIATRY | Age: 66
End: 2021-10-19
Payer: MEDICARE

## 2021-10-19 VITALS — HEIGHT: 69 IN | WEIGHT: 270 LBS | BODY MASS INDEX: 39.99 KG/M2

## 2021-10-19 DIAGNOSIS — L60.0 OC (ONYCHOCRYPTOSIS): ICD-10-CM

## 2021-10-19 DIAGNOSIS — L03.032 PARONYCHIA OF GREAT TOE OF LEFT FOOT: Primary | ICD-10-CM

## 2021-10-19 DIAGNOSIS — M79.675 PAIN IN TOE OF LEFT FOOT: ICD-10-CM

## 2021-10-19 PROCEDURE — 99213 OFFICE O/P EST LOW 20 MIN: CPT | Performed by: PODIATRIST

## 2021-10-19 ASSESSMENT — ENCOUNTER SYMPTOMS
BACK PAIN: 0
SHORTNESS OF BREATH: 0
COLOR CHANGE: 0
DIARRHEA: 0
NAUSEA: 0

## 2021-10-19 NOTE — PROGRESS NOTES
[facility]  Return Patient Progress Note    Subjective: Taylor Bradford 74 y.o. male that presents for follow up evaluation of removal of ingrown nail from the left great toe. Chief Complaint   Patient presents with    Post-Op Check     left hallux    Patient's treatment thus far has included daily dressing changes with antibiotic ointment and a band aid. Pain is rated 0 out of 10 and is described as none. Patient has been following my prescribed course of therapy as instructed. Review of Systems   Constitutional: Negative for activity change, appetite change, chills, diaphoresis, fatigue and fever. Respiratory: Negative for shortness of breath. Cardiovascular: Negative for leg swelling. Gastrointestinal: Negative for diarrhea and nausea. Endocrine: Negative for cold intolerance, heat intolerance and polyuria. Musculoskeletal: Positive for arthralgias. Negative for back pain, gait problem, joint swelling and myalgias. Skin: Positive for wound. Negative for color change, pallor and rash. Allergic/Immunologic: Negative for environmental allergies and food allergies. Neurological: Negative for dizziness, weakness, light-headedness and numbness. Hematological: Does not bruise/bleed easily. Psychiatric/Behavioral: Negative for behavioral problems, confusion and self-injury. The patient is not nervous/anxious. Objective: Clinical evaluation of the patient reveals absence to the medial border of the left hallux toenail. There is mild erythema remaining to the left hallux, but there is no calor or edema present. There is mild pain with palpation to the left hallux. There is wound slough overlying the medial nail bed. Removal of this with a sterile gauze reveals a granular wound bed that is nearly healed. There is no drainage or malodor noted to the left hallux. Assessment:    Diagnosis Orders   1. Paronychia of great toe of left foot     2. OC (onychocryptosis)     3. Pain in toe of left foot           Plan: 1. Clinical evaluation of the patient. 2. The left hallux was dressed with antibiotic ointment and a band aid. Patient to change this dressing daily until the wound heals. 3. Return if symptoms worsen or fail to improve.    10/19/2021      Mirta Peter DPM

## 2021-11-09 ENCOUNTER — OFFICE VISIT (OUTPATIENT)
Dept: PODIATRY | Age: 66
End: 2021-11-09
Payer: MEDICARE

## 2021-11-09 VITALS — BODY MASS INDEX: 39.25 KG/M2 | HEIGHT: 69 IN | WEIGHT: 265 LBS

## 2021-11-09 DIAGNOSIS — B35.1 ONYCHOMYCOSIS OF TOENAIL: Primary | ICD-10-CM

## 2021-11-09 DIAGNOSIS — M79.674 PAIN OF TOES OF BOTH FEET: ICD-10-CM

## 2021-11-09 DIAGNOSIS — E11.42 DIABETIC POLYNEUROPATHY ASSOCIATED WITH TYPE 2 DIABETES MELLITUS (HCC): ICD-10-CM

## 2021-11-09 DIAGNOSIS — E11.51 TYPE 2 DIABETES MELLITUS WITH PERIPHERAL VASCULAR DISEASE (HCC): ICD-10-CM

## 2021-11-09 DIAGNOSIS — M79.675 PAIN OF TOES OF BOTH FEET: ICD-10-CM

## 2021-11-09 PROCEDURE — 11721 DEBRIDE NAIL 6 OR MORE: CPT | Performed by: PODIATRIST

## 2021-11-11 ASSESSMENT — ENCOUNTER SYMPTOMS
SHORTNESS OF BREATH: 0
DIARRHEA: 0
BACK PAIN: 0
COLOR CHANGE: 0
NAUSEA: 0

## 2021-11-11 NOTE — PROGRESS NOTES
SUBJECTIVE: Gabrielle Horta is a 74 y.o. male who returns to the office with chief complaint of painful fungal toenails. Patient relates toe nails are thickened/difficult to trim as well as painful with ambulation and with shoe gear. Chief Complaint   Patient presents with    Nail Problem     pt here for nail trim last seen dr. Samia Son 8/12/21    Diabetes     last blood sugar 172     Review of Systems   Constitutional: Negative for activity change, appetite change, chills, diaphoresis, fatigue and fever. Respiratory: Negative for shortness of breath. Cardiovascular: Negative for leg swelling. Gastrointestinal: Negative for diarrhea and nausea. Endocrine: Negative for cold intolerance, heat intolerance and polyuria. Musculoskeletal: Positive for arthralgias. Negative for back pain, gait problem, joint swelling and myalgias. Skin: Negative for color change, pallor, rash and wound. Allergic/Immunologic: Negative for environmental allergies and food allergies. Neurological: Negative for dizziness, weakness, light-headedness and numbness. Hematological: Does not bruise/bleed easily. Psychiatric/Behavioral: Negative for behavioral problems, confusion and self-injury. The patient is not nervous/anxious. OBJECTIVE: Clinical evaluation of patient reveals nails 1,2,3,4,5 of the right foot and nails 1,2,3,4,5, of the left foot to present with thickness, elongation, discoloration, brittleness, and subungual debris. There was pain with palpation and debridement of the toenails of the bilateral feet. No open lesions noted to either foot today. The right DP pulse is palpable. The left DP pulse is not palpable. The right PT pulse is not palpable. The left PT pulse is palpable. Protective sensation is absent to the right plantar foot as noted with a 5.07 Rudd-Michelle monofilament.    Protective sensation is absent to the left plantar foot as noted with a 5.07 Rudd-Michelle monofilament. Glucose: 172 mg/dl. Class A Findings (1 needed)   [] Non-traumatic amputation of foot or integral skeleton portion thereof. [] Q7.      Class B Findings (2 needed)   1. [x] Absent posterior tibial pulse   2. [x] Absent dorsalis pedis pulse   3. [] Advanced trophic changes; three of the following are required:   ·         [] hair growth (decrease or absence)   ·         [] nail changes (thickening)   ·         [] pigmentary changes (discoloration)   ·         [] skin texture (thin, shiny)   ·         [] skin color (rubor or redness)   [x] Q8.      Class C Findings (1 Class B, 2 Class C needed)   1. [] Claudication   2. [] Temperature changes   3. [] Edema   4. [] Paresthesia   5. [] Burning   [] Q9.     ASSESSMENT:    Diagnosis Orders   1. Onychomycosis of toenail  NH DEBRIDEMENT OF NAILS, 6 OR MORE    HM DIABETES FOOT EXAM   2. Pain of toes of both feet  NH DEBRIDEMENT OF NAILS, 6 OR MORE    HM DIABETES FOOT EXAM   3. Type 2 diabetes mellitus with peripheral vascular disease (HCC)  NH DEBRIDEMENT OF NAILS, 6 OR MORE    HM DIABETES FOOT EXAM   4. Diabetic polyneuropathy associated with type 2 diabetes mellitus (HCC)  NH DEBRIDEMENT OF NAILS, 6 OR MORE    HM DIABETES FOOT EXAM     PLAN: Toenails 1,2,3,4,5 of the right foot and 1,2,3,4,5 of the left foot were debrided in length and thickness using a nail nipper and a . Return in about 9 weeks (around 1/11/2022) for At risk diabetic foot care.    11/9/2021      Blake Orlando DPM

## 2022-03-18 DIAGNOSIS — M19.079 ARTHRITIS OF SUBTALAR JOINT: Primary | ICD-10-CM

## 2022-03-21 ENCOUNTER — OFFICE VISIT (OUTPATIENT)
Dept: ORTHOPEDIC SURGERY | Age: 67
End: 2022-03-21
Payer: COMMERCIAL

## 2022-03-21 VITALS — RESPIRATION RATE: 16 BRPM | HEIGHT: 69 IN | BODY MASS INDEX: 39.69 KG/M2 | WEIGHT: 268 LBS

## 2022-03-21 DIAGNOSIS — M19.079 ARTHRITIS OF SUBTALAR JOINT: Primary | ICD-10-CM

## 2022-03-21 DIAGNOSIS — Z96.9 RETAINED ORTHOPEDIC HARDWARE: ICD-10-CM

## 2022-03-21 DIAGNOSIS — M25.871 IMPINGEMENT SYNDROME OF RIGHT ANKLE: Primary | ICD-10-CM

## 2022-03-21 DIAGNOSIS — M79.671 PAIN OF RIGHT HEEL: ICD-10-CM

## 2022-03-21 DIAGNOSIS — Z98.1 ARTHRODESIS STATUS: ICD-10-CM

## 2022-03-21 DIAGNOSIS — M19.071 ARTHRITIS OF RIGHT ANKLE: ICD-10-CM

## 2022-03-21 PROCEDURE — 99214 OFFICE O/P EST MOD 30 MIN: CPT | Performed by: ORTHOPAEDIC SURGERY

## 2022-03-21 NOTE — PROGRESS NOTES
[address] [address]  Dept: [phone]    Ambulatory Orthopedic Follow Up Visit    Preoperative Diagnosis:   1. 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 Right ankle equinus contracture  3. CAD with h/o Afib s/p ablation + pacemaker  4. Diabetes  5. H/o tobacco use  6. Body mass index is 40.61 kg/m².     Postoperative Diagnosis:   1. Same as above     Procedures Performed:  (11/12/2020)  1. Right foot subtalar fusion   2. Right percutaneous tendoachilles lengthening, performed through separate incision       CHIEF COMPLAINT:    Chief Complaint   Patient presents with    Foot Pain     Right         HISTORY OF PRESENT ILLNESS:       He is a 74 y.o. male, seen again today in the office for follow up of the above problem with a history of pain at the above location. Since being seen last, the patient is doing better. At today's visit, he is using no brace or assistive device. He does report some plantar heel pain worse with ambulation, but denies any lateral hindfoot pain. INTERVAL HISTORY 6/1/2021:  He is seen again today in the office for follow up of a previous issue (as above). Since being seen last, the patient is doing better. At today's visit, he is not using a brace or assistive device. History is obtained today from:   [x]  the patient     []  EMR     []  one family member/friend    []  multiple family members/friends    []  other:      Overall, he reports that his pain is about the same at the end of the day as it was before surgery, however he reports that he is able to do more throughout the day compared to before surgery. INTERVAL HISTORY 6/18/2021:  He is seen again today in the office for follow up of imaging as below. Since being seen last, the patient is doing better.  At today's visit, he is using no brace/assistive device. History is obtained today from:   [x]  the patient     [x]  EMR     []  one family member/friend    []  multiple family members/friends    []  other:      INTERVAL HISTORY 9/20/2021:  He is seen again today in the office for follow up of a previous issue (as above). Since being seen last, the patient is doing better. At today's visit, he is not using a brace or assistive device. History is obtained today from:   [x]  the patient     []  EMR     []  one family member/friend    []  multiple family members/friends    []  other:      INTERVAL HISTORY 3/21/2022:  He is seen again today in the office for follow up of a previous issue (as above). Since being seen last, the patient is doing better. At today's visit, he is not using a brace or assistive device. History is obtained today from:   [x]  the patient     []  EMR     []  one family member/friend    []  multiple family members/friends    []  other:      He reports some occasional plantar heel pain, but reports that his foot \"feels the best it has since 2005\". REVIEW OF SYSTEMS:   Musculoskeletal: See HPI for pertinent positives     Past Medical History:    He  has a past medical history of Allergic rhinitis, Anxiety, Arrhythmia, Arthritis, Atrial fibrillation ([address] Benign hypertension (5/26/2015), Blind right eye, Caffeine use, Cough, Depression, Diabetes mellitus ([address] GERD (gastroesophageal reflux disease), Glaucoma, Hyperlipidemia LDL goal < 100 (5/26/2015), Hypertrophy of prostate with urinary obstruction and other lower urinary tract symptoms (LUTS), Morbid obesity ([address] (5/26/2015), Mumps, Unspecified sleep apnea, and Wears glasses. Past Surgical History:    He  has a past surgical history that includes Vasectomy; Cystocopy; Cardioversion (2012 ?); Colonoscopy; Elbow surgery (Right, 1967); Total knee arthroplasty (Right, 2015); Corneal transplant (Right, multiple); Foot surgery (Right, 07/14/2016);  Ankle arthroscopy (Right, 02/23/2017); Ankle arthroscopy (Right, 2/23/2017); pacemaker placement (12/23/2016); and Ankle surgery (Right, 11/12/2020).      Current Medications:     Current Outpatient Medications:     atorvastatin (LIPITOR) 80 MG tablet, Take 1 tablet by mouth daily, Disp: 90 tablet, Rfl: 3    telmisartan (MICARDIS) 40 MG tablet, Take 1 tablet by mouth nightly, Disp: 90 tablet, Rfl: 3    SITagliptin-metFORMIN (JANUMET XR)  MG TB24 per extended release tablet, Take 1 tablet by mouth daily, Disp: 90 tablet, Rfl: 3    pantoprazole (PROTONIX) 40 MG tablet, Take 1 tablet by mouth daily, Disp: 90 tablet, Rfl: 3    meloxicam (MOBIC) 15 MG tablet, Take 1 tablet by mouth daily, Disp: 90 tablet, Rfl: 3    metoprolol succinate (TOPROL XL) 25 MG extended release tablet, Take 1 tablet by mouth daily, Disp: 90 tablet, Rfl: 3    hydroCHLOROthiazide (HYDRODIURIL) 25 MG tablet, Take 1 tablet by mouth daily, Disp: 90 tablet, Rfl: 3    blood glucose test strips (ONETOUCH ULTRA) strip, USE TO CHECK GLUCOSE TWO   TIMES A DAY BEFORE MEALS, Disp: 200 strip, Rfl: 3    clindamycin (CLEOCIN) 150 MG capsule, Take 600 mg by mouth once as needed (take 1 hour before dental procedures), Disp: , Rfl:     loratadine (CLARITIN) 10 MG tablet, Take 10 mg by mouth daily as needed (seasonal allergies), Disp: , Rfl:     fluticasone (FLONASE) 50 MCG/ACT nasal spray, 2 sprays by Each Nostril route nightly as needed for Rhinitis or Allergies, Disp: , Rfl:     sodium chloride (ANISHA 128) 5 % ophthalmic ointment, Place 1 drop into both eyes daily as needed (irritated eyes), Disp: , Rfl:     Lancets MISC, 1 each by Does not apply route daily Check glucose AC BID, dx E11.9, accuchek brand only, Disp: 200 each, Rfl: 5    aspirin 81 MG EC tablet, Take 81 mg by mouth daily, Disp: , Rfl:     Alcohol Swabs (ALCOHOL PREP) 70 % PADS, Check glucose AC BID, dx E11.9, may sub covered product, Disp: 200 each, Rfl: 11    Lancet Devices (LANCING DEVICE) MISC, Check glucose AC BID, dx E11.9, may sub covered product, Disp: 1 each, Rfl: 0    B Complex Vitamins (VITAMIN-B COMPLEX) TABS, Take 1 tablet by mouth, Disp: , Rfl:     prednisoLONE acetate (PRED FORTE) 1 % ophthalmic suspension, Place 1 drop into the right eye nightly , Disp: , Rfl:     sertraline (ZOLOFT) 100 MG tablet, Take 100 mg by mouth nightly Indications: Depression , Disp: , Rfl:      Allergies: Other, Penicillins, Sulfa antibiotics, Tetanus immune globulin, Tetanus toxoids, and Timolol maleate    Family History:  family history includes Asthma in his sister; Cancer in his mother and sister; Heart Attack in his father; Heart Disease in his brother and another family member; High Blood Pressure in his father; Kidney Cancer in his father.     Social History:   Social History     Occupational History    Occupation: Retired    Tobacco Use    Smoking status: Former Smoker     Packs/day: 1.50     Years: 20.00     Pack years: 30.00     Types: Cigarettes     Start date:      Quit date:      Years since quittin.2    Smokeless tobacco: Former User    Tobacco comment: quit    Vaping Use    Vaping Use: Never used   Substance and Sexual Activity    Alcohol use: No     Alcohol/week: 0.0 standard drinks    Drug use: No    Sexual activity: Yes     Partners: Female       OBJECTIVE:  Resp 16   Ht 5' 9\" (1.753 m)   Wt 268 lb (121.6 kg)   BMI 39.58 kg/m²    Psych: alert and oriented to person, time, and place  Cardio:  well perfused extremities  Resp:  normal respiratory effort  Neuro:  sensation to light touch unchanged since last visit  Musculoskeletal:    Incisions well-healed, no erythema/dehiscence/drainage  Sensation to light touch grossly intact throughout   Warm and well perfused  Grossly neurovascularly intact distally  No signs of infection  -No gross motion of the subtalar joint, painless ankle range of motion but decreased  -No significant tenderness at the sinus Tarsi and plantar heel  -No significant tenderness over the hardware      RADIOLOGY:   3/21/2022 FINDINGS:  Three weightbearing views (AP, mortise, lateral) of the right ankle, and 3 weightbearing views (AP, Oblique, Lateral) of the right foot were obtained in the office today and reviewed, revealing no acute fracture, dislocation, or radioopaque foreign body/tumor. Intact hardware status post subtalar fusion without evidence of loosening. No interval displacement. Interval healing noted.     IMPRESSION: Status post subtalar fusion as above.     Electronically signed by Daniella Coker,         CT images and radiology report reviewed, as below:      1. Approximately 50% bony fusion of the subtalar joint surrounding the   screws.  Additional posterior bony bridging. FINDINGS:  Three weightbearing views (AP, Oblique, Lateral) of the right foot and 3 weightbearing views (AP, mortise, and lateral) of the right ankle were obtained in the office today and reviewed, revealing no acute fracture, dislocation, or radioopaque foreign body/tumor. Intact hardware status post subtalar fusion without evidence of loosening. No interval displacement. Questionable interval healing.     IMPRESSION: Status post subtalar fusion as above.     Electronically signed by Daniella Coker MD      ASSESSMENT AND PLAN:  Body mass index is 39.58 kg/m². He is status post the above (on 11/12/2020), doing well overall. A CT scan on 6/11/2021 showed that his subtalar joint with approximately 50%, and he has healed more since that time, along with the help of a bone stimulator. He does still have ipsilateral right ankle arthritis with anterior ankle impingement, however this has not bothered him much. He does have occasional intermittent plantar heel pain, but reports that he can \"live with it\".       He has a history of right severe subtalar joint arthritis (status post attempted subtalar fusion with subsequent removal of hardware, with nonunion of his subtalar joint), along with ipsilateral ankle arthritis with anterior impingement, as sequelae of a fall from a ladder in 2005 while at work.     Mey Alu a somewhat complex past medical history including but not limited to the following:  Atrial fibrillation (status post ablation plus pacemaker; not taking a blood thinner), KIM, obesity (BMI greater than 40), non-insulin-dependent diabetes, and tobacco use (reports he smokes 1 cigar/day).   His most recent hemoglobin A1c above. We had a discussion today about the likely diagnosis and its natural history, physical exam and imaging findings, as well as various treatment options in detail. Surgically, we again discussed a possible future right total ankle replacement, depending on his clinical course. At today's visit, as he denies any limitations from his ankle pain, I do not recommend anything surgical, and recommended continuing with conservative management. Orders/referrals were placed as below at today's visit. He may continue activity as tolerated, and needs no immobilization. He was provided a new set of heel cups, which he may use as needed. I provided a new prescription for Voltaren (4g TOPL q QID PRN pain). We have discussed a right ankle injection, however, as he does not have much pain at this location, we decided to hold off on this option at this time. He no longer needs to use the bone stimulator. All questions were answered and the above plan was agreed upon. The patient will return to clinic in the future as needed. In the future, we may consider a right ankle injection, custom brace, and/or surgery as above.          At the patient's next visit, depending on how the patient is doing and/or new imaging/labs results, we may consider the following options:    []  Orthotic (OTC)     []  Orthotic (custom)          []  Rocker bottom shoes     []  Brace (OTC)        []  Brace (custom) []  CAM boot        []  Night splint         []  Heel cups        []  Strap      []  Toe sleeves/splints    []  PT:                     []  Wean out of immobilization   []  Advance activity       []  Topical               []  NSAIDs          []  Margarita         []  Referral:         []  Stress xrays       []  CT         []  MRI        []  Injection:         []  Consider OR      []  Pick OR date    No follow-ups on file. No orders of the defined types were placed in this encounter. No orders of the defined types were placed in this encounter. Reji Warren MD  Orthopedic Surgery        Please excuse any typos/errors, as this note was created with the assistance of voice recognition software. While intending to generate a document that actually reflects the content of the visit, the document can still have some errors including those of syntax and sound-a-like substitutions which may escape proof reading. In such instances, actual meaning can be extrapolated by context.

## 2022-09-14 ENCOUNTER — HOSPITAL ENCOUNTER (OUTPATIENT)
Age: 67
Discharge: HOME | End: 2022-09-14
Payer: MEDICARE

## 2022-09-14 DIAGNOSIS — Z12.5 SCREENING FOR MALIGNANT NEOPLASM OF PROSTATE: ICD-10-CM

## 2022-09-14 DIAGNOSIS — E11.9 CONTROLLED TYPE 2 DIABETES MELLITUS WITHOUT COMPLICATION, WITHOUT LONG-TERM CURRENT USE OF INSULIN (HCC): ICD-10-CM

## 2022-09-14 LAB
ALBUMIN SERPL-MCNC: 4.3 G/DL (ref 3.5–5.2)
ALP BLD-CCNC: 90 U/L (ref 40–129)
ALT SERPL-CCNC: 52 U/L (ref 5–41)
ANION GAP SERPL CALCULATED.3IONS-SCNC: 12 MMOL/L (ref 9–17)
AST SERPL-CCNC: 40 U/L
BILIRUB SERPL-MCNC: 0.5 MG/DL (ref 0.3–1.2)
BUN BLDV-MCNC: 19 MG/DL (ref 8–23)
CALCIUM SERPL-MCNC: 9.7 MG/DL (ref 8.6–10.4)
CHLORIDE BLD-SCNC: 103 MMOL/L (ref 98–107)
CHOLESTEROL/HDL RATIO: 2.8
CHOLESTEROL: 84 MG/DL
CO2: 25 MMOL/L (ref 20–31)
CREAT SERPL-MCNC: 0.95 MG/DL (ref 0.7–1.2)
GFR AFRICAN AMERICAN: >60 ML/MIN
GFR NON-AFRICAN AMERICAN: >60 ML/MIN
GFR SERPL CREATININE-BSD FRML MDRD: ABNORMAL ML/MIN/{1.73_M2}
GLUCOSE BLD-MCNC: 202 MG/DL (ref 70–99)
HDLC SERPL-MCNC: 30 MG/DL
LDL CHOLESTEROL: 4 MG/DL (ref 0–130)
POTASSIUM SERPL-SCNC: 4.3 MMOL/L (ref 3.7–5.3)
PROSTATE SPECIFIC ANTIGEN: 0.22 NG/ML
SODIUM BLD-SCNC: 140 MMOL/L (ref 135–144)
TOTAL PROTEIN: 6.9 G/DL (ref 6.4–8.3)
TRIGL SERPL-MCNC: 252 MG/DL

## 2022-09-14 PROCEDURE — 80053 COMPREHEN METABOLIC PANEL: CPT

## 2022-09-14 PROCEDURE — 36415 COLL VENOUS BLD VENIPUNCTURE: CPT

## 2022-09-14 PROCEDURE — G0103 PSA SCREENING: HCPCS

## 2022-09-14 PROCEDURE — 80061 LIPID PANEL: CPT

## 2023-08-17 LAB — PROSTATE SPECIFIC ANTIGEN: 0.28 NG/ML

## 2023-09-14 ENCOUNTER — OFFICE VISIT (OUTPATIENT)
Age: 68
End: 2023-09-14
Payer: MEDICARE

## 2023-09-14 VITALS — HEIGHT: 69 IN | WEIGHT: 266 LBS | BODY MASS INDEX: 39.4 KG/M2

## 2023-09-14 DIAGNOSIS — N13.8 BPH WITH OBSTRUCTION/LOWER URINARY TRACT SYMPTOMS: Primary | ICD-10-CM

## 2023-09-14 DIAGNOSIS — N40.1 BPH WITH OBSTRUCTION/LOWER URINARY TRACT SYMPTOMS: Primary | ICD-10-CM

## 2023-09-14 LAB
BILIRUBIN, POC: NORMAL
BLOOD URINE, POC: NORMAL
CLARITY, POC: CLEAR
COLOR, POC: YELLOW
GLUCOSE URINE, POC: NORMAL
KETONES, POC: NORMAL
LEUKOCYTE EST, POC: NORMAL
NITRITE, POC: NORMAL
PH, POC: NORMAL
PROTEIN, POC: NORMAL
SPECIFIC GRAVITY, POC: NORMAL
UROBILINOGEN, POC: NORMAL

## 2023-09-14 PROCEDURE — 81003 URINALYSIS AUTO W/O SCOPE: CPT | Performed by: SPECIALIST

## 2023-09-14 PROCEDURE — 99213 OFFICE O/P EST LOW 20 MIN: CPT | Performed by: SPECIALIST

## 2023-09-14 PROCEDURE — 1123F ACP DISCUSS/DSCN MKR DOCD: CPT | Performed by: SPECIALIST

## 2023-09-14 NOTE — PROGRESS NOTES
[facility], [facility] [address] Urology Office Progress Note    Patient:  José Miguel Johnson  YOB: 1955  Date: 9/14/2023    HISTORY OF PRESENT ILLNESS:   The patient is a 77 y.o. male  BPH symptoms are mild and not bothersome and thus medical therapy not indicated. Today's VAN was normal with no suspicious nodules or induration.    Lower urinary tract symptoms: frequency, decreased urinary stream, and nocturia, 1 times per night   Last AUA Symptom Score (QOL): 6 (1)  Today's AUA Symptom Score (QOL): 6 (2)    Summary of old records:    BPH; does not want Rx at this time  Terminal dribble: strip urethra at end    Additional History: none    Procedures Today: N/A    Urinalysis today:  Results for POC orders placed in visit on 09/14/23   POCT Urinalysis No Micro (Auto)   Result Value Ref Range    Color, UA yellow     Clarity, UA clear     Glucose, UA POC neg     Bilirubin, UA      Ketones, UA      Spec Grav, UA      Blood, UA POC neg     pH, UA      Protein, UA POC neg     Urobilinogen, UA      Leukocytes, UA neg     Nitrite, UA neg        Last several PSA's:  Lab Results   Component Value Date    PSA 0.28 08/17/2023    PSA 0.22 09/14/2022    PSA 0.22 08/03/2021       Last total testosterone:  No results found for: \"TESTOSTERONE\"    Last BUN and creatinine:  Lab Results   Component Value Date    BUN 19 09/14/2022     Lab Results   Component Value Date    CREATININE 0.95 09/14/2022     [facility] GLUCOSE RANDOM 70 - 99 mg/dL 202 High      UREA NITROGEN 8 - 23 mg/dL 19     CREATININE 0.70 - 1.20 mg/dL 0.95     CALCIUM 8.6 - 10.4 mg/dL 9.7     SODIUM 135 - 144 mmol/L 140     POTASSIUM 3.7 - 5.3 mmol/L 4.3     CHLORIDE 98 - 107 mmol/L 103     CARBON DIOXIDE 20 - 31 mmol/L 25     ANION GAP 9 - 17 mmol/L 12     ALKALINE PHOSPHATASE 40 - 129 U/L 90     ALT (SGPT), POC 5 - 41 U/L 52 High      AST (SGOT) <40 U/L 40 High      BILIRUBIN, TOTAL 0.3 - 1.2 mg/dL 0.5     TOTAL PROTEIN 6.4 -

## 2023-12-08 ENCOUNTER — TELEPHONE (OUTPATIENT)
Dept: PHARMACY | Facility: CLINIC | Age: 68
End: 2023-12-08

## 2023-12-08 NOTE — TELEPHONE ENCOUNTER
POPULATION HEALTH CLINICAL PHARMACY: ADHERENCE REVIEW  Identified care gap per [employer]: fills at [facility]: ACE/ARB, Diabetes, and Statin adherence    ASSESSMENT    ACE/ARB ADHERENCE    Insurance Records claims through 23 (Prior Year  [address] = 99% - PASSED; YTD  [address] = 85%; Potential Fail Date: 23):   TELMISARTAN TAB 40MG last filled on 23 for 90 day supply. Next refill due: 10/17/23    Prescribed si tablet/capsule daily    Per Reconcile Dispense History: last filled on 23 for 90 day supply. Per [facility]:  Unable to speak to live employee. BP Readings from Last 3 Encounters:   10/17/23 121/72   23 108/80   23 110     CrCl cannot be calculated (Patient's most recent lab result is older than the maximum 180 days allowed. ). Lab Results   Component Value Date    CREATININE 0.95 2022     Lab Results   Component Value Date    K 4.3 2022     DIABETES ADHERENCE    Insurance Records claims through 23 (Prior Year  [address] = 100% - PASSED; YTD PDC = 100% - PASSED):   JANUMET XR TAB  last filled on 10/11/23 for 90 day supply. Next refill due: 4/10/24    Prescribed si tablet/capsule daily      [address] Records claims through 23 (Prior Year PDC = 98% - PASSED; YTD PDC = 100% - PASSED):   ATORVASTATIN TAB 80MG last filled on 23 for 90 day supply. Next refill due: 23    Prescribed si tablet/capsule daily        PLAN    The following are interventions that have been identified:   Patient overdue refilling Telmisartan. Unsure if patient is still prescribed this medication. Reached patient to review. Patient stated he is no longer taking Telmisartan 40mg. Stated the reason he is not taking is it \"didn't agree\" with him. Asked if provider is aware he is not taking any longer and he stated provider is aware of this. Will route to pharmacist to review and possibly discontinue medication off medication chart.

## 2023-12-11 NOTE — TELEPHONE ENCOUNTER
Per chart, appears telmisartan had been removed by PCP 9/13/22 as \"list cleanup\" at appt for dizziness & vertigo. Appears was added back as historical medication @12/16/22 refill encounter and then reordered 12/30/22 from automated [employer] request from his pharmacy.  Will remove from medication list per patient request.

## 2024-03-19 ENCOUNTER — OFFICE VISIT (OUTPATIENT)
Dept: ORTHOPEDIC SURGERY | Age: 69
End: 2024-03-19
Payer: MEDICARE

## 2024-03-19 VITALS — WEIGHT: 263 LBS | OXYGEN SATURATION: 100 % | HEIGHT: 69 IN | RESPIRATION RATE: 15 BRPM | BODY MASS INDEX: 38.95 KG/M2

## 2024-03-19 DIAGNOSIS — G89.29 CHRONIC PAIN OF LEFT KNEE: ICD-10-CM

## 2024-03-19 DIAGNOSIS — M17.10 ARTHRITIS OF KNEE: Primary | ICD-10-CM

## 2024-03-19 DIAGNOSIS — M25.562 CHRONIC PAIN OF LEFT KNEE: ICD-10-CM

## 2024-03-19 DIAGNOSIS — M25.562 LEFT KNEE PAIN, UNSPECIFIED CHRONICITY: Primary | ICD-10-CM

## 2024-03-19 PROCEDURE — 20610 DRAIN/INJ JOINT/BURSA W/O US: CPT | Performed by: ORTHOPAEDIC SURGERY

## 2024-03-19 PROCEDURE — 99214 OFFICE O/P EST MOD 30 MIN: CPT | Performed by: ORTHOPAEDIC SURGERY

## 2024-03-19 PROCEDURE — 1123F ACP DISCUSS/DSCN MKR DOCD: CPT | Performed by: ORTHOPAEDIC SURGERY

## 2024-03-19 RX ORDER — LIDOCAINE HYDROCHLORIDE 10 MG/ML
4 INJECTION, SOLUTION INFILTRATION; PERINEURAL ONCE
Status: COMPLETED | OUTPATIENT
Start: 2024-03-19 | End: 2024-03-19

## 2024-03-19 RX ORDER — TRIAMCINOLONE ACETONIDE 40 MG/ML
40 INJECTION, SUSPENSION INTRA-ARTICULAR; INTRAMUSCULAR ONCE
Status: COMPLETED | OUTPATIENT
Start: 2024-03-19 | End: 2024-03-19

## 2024-03-19 RX ADMIN — TRIAMCINOLONE ACETONIDE 40 MG: 40 INJECTION, SUSPENSION INTRA-ARTICULAR; INTRAMUSCULAR at 12:35

## 2024-03-19 RX ADMIN — LIDOCAINE HYDROCHLORIDE 4 ML: 10 INJECTION, SOLUTION INFILTRATION; PERINEURAL at 12:34

## 2024-03-19 NOTE — PROGRESS NOTES
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     Postoperative Diagnosis:   Same as above     Procedures Performed:  (11/12/2020)  Right foot subtalar fusion   Right percutaneous tendoachilles lengthening, performed through separate incision       CHIEF COMPLAINT:    Chief Complaint   Patient presents with    Knee Pain     Left          HISTORY OF PRESENT ILLNESS:       He is a 68 y.o. male, seen again today in the office for follow up of the above problem with a history of pain at the above location. Since being seen last, the patient is doing better. At today's visit, he is using no brace or assistive device.  He does report some plantar heel pain worse with ambulation, but denies any lateral hindfoot pain.    INTERVAL HISTORY 6/1/2021:  He is seen again today in the office for follow up of a previous issue (as above). Since being seen last, the patient is doing better. At today's visit, he is not using a brace or assistive device.    History is obtained today from:   [x]  the patient     []  EMR     []  one family member/friend    []  multiple family members/friends    []  other:      Overall, he reports that his pain is about the same at the end of the day as it was before surgery, however he reports that he is able to do more throughout the day compared to before surgery.    INTERVAL HISTORY 6/18/2021:  He is seen again today in the office for follow up of imaging as below. Since being seen last, the patient is doing better. At today's visit, he is

## 2024-07-22 ENCOUNTER — OFFICE VISIT (OUTPATIENT)
Dept: ORTHOPEDIC SURGERY | Age: 69
End: 2024-07-22
Payer: MEDICARE

## 2024-07-22 VITALS — HEIGHT: 69 IN | RESPIRATION RATE: 16 BRPM | OXYGEN SATURATION: 100 % | WEIGHT: 258 LBS | BODY MASS INDEX: 38.21 KG/M2

## 2024-07-22 DIAGNOSIS — M17.10 ARTHRITIS OF KNEE: Primary | ICD-10-CM

## 2024-07-22 DIAGNOSIS — G89.29 CHRONIC PAIN OF LEFT KNEE: ICD-10-CM

## 2024-07-22 DIAGNOSIS — M25.562 CHRONIC PAIN OF LEFT KNEE: ICD-10-CM

## 2024-07-22 PROCEDURE — 20610 DRAIN/INJ JOINT/BURSA W/O US: CPT | Performed by: ORTHOPAEDIC SURGERY

## 2024-07-22 RX ORDER — TRIAMCINOLONE ACETONIDE 40 MG/ML
40 INJECTION, SUSPENSION INTRA-ARTICULAR; INTRAMUSCULAR ONCE
Status: COMPLETED | OUTPATIENT
Start: 2024-07-22 | End: 2024-07-22

## 2024-07-22 RX ORDER — LIDOCAINE HYDROCHLORIDE 10 MG/ML
4 INJECTION, SOLUTION INFILTRATION; PERINEURAL ONCE
Status: COMPLETED | OUTPATIENT
Start: 2024-07-22 | End: 2024-07-22

## 2024-07-22 RX ADMIN — TRIAMCINOLONE ACETONIDE 40 MG: 40 INJECTION, SUSPENSION INTRA-ARTICULAR; INTRAMUSCULAR at 11:15

## 2024-07-22 RX ADMIN — LIDOCAINE HYDROCHLORIDE 4 ML: 10 INJECTION, SOLUTION INFILTRATION; PERINEURAL at 11:15

## 2024-07-22 NOTE — PROGRESS NOTES
fusion as above.     Electronically signed by Rony Borrego MD      ASSESSMENT AND PLAN:  Body mass index is 38.1 kg/m².  Assessment & Plan     He has left knee pain, secondary tricompartmental osteoarthritis.  A left knee steroid injection on 3/19/2024 helped his pain approximately 75% for about 4 weeks.      He has a history of right severe subtalar joint arthritis (status post attempted subtalar fusion with subsequent removal of hardware, with nonunion of his subtalar joint by Dr. Singh), along with ipsilateral ankle arthritis with anterior impingement, as sequelae of a fall from a ladder in 2005 while at work.  He is status post right subtalar fusion (on 11/12/2020), doing well overall. A CT scan on 6/11/2021 showed that his subtalar joint with approximately 50%, and he has healed more since that time, along with the help of a bone stimulator.  He does still have ipsilateral right ankle arthritis with anterior ankle impingement, however this has not bothered him much.  He does have occasional intermittent plantar heel pain, but reports that he can \"live with it\".         Notably, he has a somewhat complex past medical history including but not limited to the following:  Atrial fibrillation (status post ablation plus pacemaker; not taking a blood thinner), KIM, obesity (BMI greater than 40), non-insulin-dependent diabetes, and tobacco use (reports he smokes 1 cigar/day).     We had a discussion today about the likely diagnosis and its natural history, physical exam and imaging findings, as well as various treatment options in detail.    Surgically, we have discussed a possible left total knee replacement, depending on his clinical course.  Prior to his initial office visit for his left knee, he reports he has tried rest and Tylenol.  At today's visit, we did decide to continue with conservative management.      (Previous discussion)  Surgically, we again discussed a possible future right total ankle

## 2024-10-14 SDOH — HEALTH STABILITY: PHYSICAL HEALTH: ON AVERAGE, HOW MANY MINUTES DO YOU ENGAGE IN EXERCISE AT THIS LEVEL?: 30 MIN

## 2024-10-14 SDOH — HEALTH STABILITY: PHYSICAL HEALTH: ON AVERAGE, HOW MANY DAYS PER WEEK DO YOU ENGAGE IN MODERATE TO STRENUOUS EXERCISE (LIKE A BRISK WALK)?: 2 DAYS

## 2024-10-16 ENCOUNTER — OFFICE VISIT (OUTPATIENT)
Dept: ORTHOPEDIC SURGERY | Age: 69
End: 2024-10-16
Payer: MEDICARE

## 2024-10-16 ENCOUNTER — TELEPHONE (OUTPATIENT)
Dept: ORTHOPEDIC SURGERY | Age: 69
End: 2024-10-16

## 2024-10-16 DIAGNOSIS — M17.12 OSTEOARTHRITIS OF LEFT KNEE, UNSPECIFIED OSTEOARTHRITIS TYPE: ICD-10-CM

## 2024-10-16 DIAGNOSIS — G89.29 CHRONIC PAIN OF LEFT KNEE: Primary | ICD-10-CM

## 2024-10-16 DIAGNOSIS — M25.562 CHRONIC PAIN OF LEFT KNEE: Primary | ICD-10-CM

## 2024-10-16 PROCEDURE — 1123F ACP DISCUSS/DSCN MKR DOCD: CPT | Performed by: ORTHOPAEDIC SURGERY

## 2024-10-16 PROCEDURE — 99213 OFFICE O/P EST LOW 20 MIN: CPT | Performed by: ORTHOPAEDIC SURGERY

## 2024-10-16 RX ORDER — BUPIVACAINE HYDROCHLORIDE 2.5 MG/ML
2 INJECTION, SOLUTION INFILTRATION; PERINEURAL ONCE
Status: SHIPPED | OUTPATIENT
Start: 2024-10-16

## 2024-10-16 RX ORDER — LIDOCAINE HYDROCHLORIDE 10 MG/ML
2 INJECTION, SOLUTION INFILTRATION; PERINEURAL ONCE
Status: SHIPPED | OUTPATIENT
Start: 2024-10-16

## 2024-10-16 RX ORDER — BETAMETHASONE SODIUM PHOSPHATE AND BETAMETHASONE ACETATE 3; 3 MG/ML; MG/ML
12 INJECTION, SUSPENSION INTRA-ARTICULAR; INTRALESIONAL; INTRAMUSCULAR; SOFT TISSUE ONCE
Status: SHIPPED | OUTPATIENT
Start: 2024-10-16

## 2024-10-16 NOTE — TELEPHONE ENCOUNTER
After further review, it appears that patient has [employer] Medicare. As of 10/1/24, [employer] medicare does not require a prior-authorization for preferred product Durolane. Should confirm with patient that he has [employer] Medicare as primary. If [employer] medicare is patient's primary, patient can be scheduled for left knee Durolane injection at his convenience.

## 2024-10-16 NOTE — PROGRESS NOTES
Resource Strain (CARDIA)     Difficulty of Paying Living Expenses: Not hard at all   Food Insecurity: No Food Insecurity (5/10/2024)    Received from [employer] [employer]    Hunger Screening     Within the past 12 months we worried whether our food would run out before we got money to buy more.: Never True     Within the past 12 months the food we bought just didn't last and we didn't have money to get more.: Never True   Transportation Needs: Unknown (5/3/2024)    PRAPARE - Transportation     Lack of Transportation (Medical): Not on file     Lack of Transportation (Non-Medical): No   Physical Activity: Insufficiently Active (10/14/2024)    Exercise Vital Sign     Days of Exercise per Week: 2 days     Minutes of Exercise per Session: 30 min   Stress: Not on file   Social Connections: Not on file   Intimate Partner Violence: Not on file   Housing Stability: Unknown (5/3/2024)    Housing Stability Vital Sign     Unable to Pay for Housing in the Last Year: Not on file     Number of Places Lived in the Last Year: Not on file     Unstable Housing in the Last Year: No     Past Medical History:   Diagnosis Date    Allergic rhinitis     Anxiety     Arrhythmia     Arthritis     Atrial fibrillation (HCC)     on Pacemaker, No Blood Thinner, 2 Ablation    Benign hypertension 5/26/2015    Blind right eye     Caffeine use     3 cups coffee day    Cough     Clear sputum, due to sinus drainage per pt.    Depression     Diabetes mellitus (HCC)     GERD (gastroesophageal reflux disease)     Glaucoma     Hyperlipidemia LDL goal < 100 5/26/2015    Hypertrophy of prostate with urinary obstruction and other lower urinary tract symptoms (LUTS)     Morbid obesity 5/26/2015    Mumps     as a child    Unspecified sleep apnea     bi pap use    Wears glasses      Past Surgical History:   Procedure Laterality Date    ANKLE ARTHROSCOPY Right 02/23/2017    WITH DEBRIDEMENT     ANKLE ARTHROSCOPY Right 2/23/2017    ANKLE

## 2024-10-17 NOTE — TELEPHONE ENCOUNTER
Left voicemail for patient asking patient to confirm that he has [employer] Medicare. If patient has [employer] Medicare, no prior authorization is required. Therefore, patient can be scheduled for Durolane injection for bilateral knees at either location. Office should be contacted prior to scheduling to ensure medication is in stock.

## 2024-10-18 NOTE — TELEPHONE ENCOUNTER
Patient confirms that he has [employer] medicare for primary insurance. Patient therefore scheduled at first available appointment time at [facility] next Friday, 10/25/24. Message will be sent to [facility] staff to ensure there are able to accommodate patient/have medication in stock.

## 2024-10-18 NOTE — TELEPHONE ENCOUNTER
[facility],    Patient was scheduled for L Knee Durolane injection at [facility] on 10/25/24. No prior auth required by [employer] medicare for preferred product Durolane as of 10/1/24. Please ensure that you have one Durolane injection available for patient at [facility].

## 2024-10-25 ENCOUNTER — PROCEDURE VISIT (OUTPATIENT)
Dept: ORTHOPEDIC SURGERY | Age: 69
End: 2024-10-25

## 2024-10-25 DIAGNOSIS — M25.562 CHRONIC PAIN OF LEFT KNEE: Primary | ICD-10-CM

## 2024-10-25 DIAGNOSIS — G89.29 CHRONIC PAIN OF LEFT KNEE: Primary | ICD-10-CM

## 2024-10-25 RX ORDER — LIDOCAINE HYDROCHLORIDE 10 MG/ML
2 INJECTION, SOLUTION INFILTRATION; PERINEURAL ONCE
Status: COMPLETED | OUTPATIENT
Start: 2024-10-25 | End: 2024-10-25

## 2024-10-25 RX ORDER — BUPIVACAINE HYDROCHLORIDE 5 MG/ML
2 INJECTION, SOLUTION PERINEURAL ONCE
Status: COMPLETED | OUTPATIENT
Start: 2024-10-25 | End: 2024-10-25

## 2024-10-25 RX ADMIN — LIDOCAINE HYDROCHLORIDE 2 ML: 10 INJECTION, SOLUTION INFILTRATION; PERINEURAL at 08:33

## 2024-10-25 RX ADMIN — BUPIVACAINE HYDROCHLORIDE 10 MG: 5 INJECTION, SOLUTION PERINEURAL at 08:32

## 2024-10-25 NOTE — PROGRESS NOTES
[facility] Orthoped[facility] Medicine                   Armaan Henderson M.D.            [address], [address]               [place], [place] [address]           Dept Phone: [phone]           Dept Fax:  [phone] [address]                       [address]           [place], [place] [address]          Dept Phone: [phone]           Dept Fax:  [phone]      Chief Compliant:  Chief Complaint   Patient presents with    Pain     Lt knee        History of Present Illness:  This is a 69 y.o. male who presents to the clinic today for evaluation / follow up of left knee pain.  Please refer to previous dictation.  Patient has a previous history of right total knee arthroplasty per Dr. Graves he has marked DJD of his left knee but he is here today for Durolane injection as he has gotten a prior authorization for this.       Review of Systems   Constitutional: Negative for fever, chills, sweats.   Eyes: Negative for changes in vision, or pain.   HENT: Negative for ear ache, epistaxis, or sore throat.  Respiratory/Cardio: Negative for Chest pain, palpitations, SOB, or cough.  Gastrointestinal: Negative for abdominal pain, N/V/D.   Genitourinary: Negative for dysuria, frequency, urgency, or hematuria.   Neurological: Negative for headache, numbness, or weakness.   Integumentary: Negative for rash, itching, laceration, or abrasion.   Musculoskeletal: Positive for Pain (Lt knee)       Physical Exam:  Constitutional: Patient is oriented to person, place, and time. Patient appears well-developed and well nourished.   HENT: Negative otherwise noted  Head: Normocephalic and Atraumatic  Nose: Normal  Eyes: Conjunctivae and EOM are normal  Neck: Normal range of motion Neck supple.    Respiratory/Cardio: Effort normal. No respiratory distress.  Musculoskeletal: No new examination was carried out today  Neurological: Patient is alert and oriented to person, place, and time. Normal strength.

## 2024-12-20 LAB
ESTIMATED AVERAGE GLUCOSE: NORMAL
HBA1C MFR BLD: 6.6 %

## 2025-01-20 ENCOUNTER — OFFICE VISIT (OUTPATIENT)
Dept: ORTHOPEDIC SURGERY | Age: 70
End: 2025-01-20
Payer: COMMERCIAL

## 2025-01-20 VITALS — RESPIRATION RATE: 14 BRPM | HEIGHT: 69 IN | WEIGHT: 248 LBS | BODY MASS INDEX: 36.73 KG/M2

## 2025-01-20 DIAGNOSIS — G89.29 CHRONIC PAIN OF LEFT KNEE: Primary | ICD-10-CM

## 2025-01-20 DIAGNOSIS — M25.562 CHRONIC PAIN OF LEFT KNEE: Primary | ICD-10-CM

## 2025-01-20 DIAGNOSIS — M17.12 OSTEOARTHRITIS OF LEFT KNEE, UNSPECIFIED OSTEOARTHRITIS TYPE: ICD-10-CM

## 2025-01-20 PROCEDURE — 1123F ACP DISCUSS/DSCN MKR DOCD: CPT | Performed by: PHYSICIAN ASSISTANT

## 2025-01-20 PROCEDURE — 99213 OFFICE O/P EST LOW 20 MIN: CPT | Performed by: PHYSICIAN ASSISTANT

## 2025-01-20 PROCEDURE — 99406 BEHAV CHNG SMOKING 3-10 MIN: CPT | Performed by: PHYSICIAN ASSISTANT

## 2025-01-20 ASSESSMENT — ENCOUNTER SYMPTOMS
SHORTNESS OF BREATH: 0
COUGH: 0
COLOR CHANGE: 0
VOMITING: 0

## 2025-01-20 NOTE — PROGRESS NOTES
[facility] ORTHOPEDICS  [address] [place] [address]  Dept: [phone]  Dept Fax: [phone]        Ambulatory Follow Up      Subjective:   Cong Cui is a 69 y.o. year old male who presents to our office today for routine followup regarding his   1. Chronic pain of left knee    2. Osteoarthritis of left knee, unspecified osteoarthritis type    .    Chief Complaint   Patient presents with    Knee Pain     NP: Left knee pain       History of Present Illness  The patient is a 69-year-old male here today in follow-up for chronic left knee pain. He was previously evaluated on 10/25/2024 by Dr. Henderson and underwent a left knee Durolane injection, which provided approximately 80% relief until around Thanksgiving (~1 month).    He has been informed of the presence of a spur in his knee and has expressed a preference for replacement surgery over repeated procedures to remove the spurs. Dr. Henderson has suggested that his knee could potentially last another 2 years, but he is skeptical of this prognosis. He recalls receiving a gel injection during his last visit.    He has diabetes. His last hemoglobin A1c was 6.6 in December.  His current BMI is 36.62 (248 pounds).    SOCIAL HISTORY  He smokes a cigar once or twice a week.      Review of Systems   Constitutional:  Negative for activity change and fever.   HENT:  Negative for sneezing.    Respiratory:  Negative for cough and shortness of breath.    Cardiovascular:  Negative for chest pain.   Gastrointestinal:  Negative for vomiting.   Musculoskeletal:  Positive for arthralgias (left knee). Negative for joint swelling and myalgias.   Skin:  Negative for color change.   Neurological:  Negative for weakness and numbness.   Psychiatric/Behavioral:  Negative for sleep disturbance.          Objective :   Resp 14   Ht 1.753 m (5' 9\")   Wt 112.5 kg (248 lb)   BMI 36.62 kg/m²  Body mass index

## 2025-04-07 ENCOUNTER — OFFICE VISIT (OUTPATIENT)
Dept: ORTHOPEDIC SURGERY | Age: 70
End: 2025-04-07
Payer: COMMERCIAL

## 2025-04-07 ENCOUNTER — PREP FOR PROCEDURE (OUTPATIENT)
Dept: ORTHOPEDIC SURGERY | Age: 70
End: 2025-04-07

## 2025-04-07 VITALS — BODY MASS INDEX: 36.73 KG/M2 | HEIGHT: 69 IN | RESPIRATION RATE: 18 BRPM | WEIGHT: 248 LBS

## 2025-04-07 DIAGNOSIS — M17.12 PRIMARY OSTEOARTHRITIS OF LEFT KNEE: ICD-10-CM

## 2025-04-07 DIAGNOSIS — M17.12 OSTEOARTHRITIS OF LEFT KNEE, UNSPECIFIED OSTEOARTHRITIS TYPE: ICD-10-CM

## 2025-04-07 DIAGNOSIS — M17.12 OSTEOARTHRITIS OF LEFT KNEE, UNSPECIFIED OSTEOARTHRITIS TYPE: Primary | ICD-10-CM

## 2025-04-07 DIAGNOSIS — Z01.818 PRE-OP TESTING: Primary | ICD-10-CM

## 2025-04-07 PROCEDURE — 1123F ACP DISCUSS/DSCN MKR DOCD: CPT | Performed by: ORTHOPAEDIC SURGERY

## 2025-04-07 PROCEDURE — 99213 OFFICE O/P EST LOW 20 MIN: CPT | Performed by: ORTHOPAEDIC SURGERY

## 2025-04-07 NOTE — PROGRESS NOTES
Transportation     Lack of Transportation (Medical): No     Lack of Transportation (Non-Medical): No   Physical Activity: Inactive (3/21/2025)    Exercise Vital Sign     Days of Exercise per Week: 0 days     Minutes of Exercise per Session: 0 min   Stress: Not on file   Social Connections: Not on file   Intimate Partner Violence: Not on file   Housing Stability: Low Risk  (3/21/2025)    Housing Stability Vital Sign     Unable to Pay for Housing in the Last Year: No     Number of Times Moved in the Last Year: 0     Homeless in the Last Year: No     Past Medical History:   Diagnosis Date    Allergic rhinitis     Anxiety     Arrhythmia     Arthritis     Atrial fibrillation (HCC)     on Pacemaker, No Blood Thinner, 2 Ablation    Benign hypertension 5/26/2015    Blind right eye     Caffeine use     3 cups coffee day    Cough     Clear sputum, due to sinus drainage per pt.    Depression     Diabetes mellitus (HCC)     GERD (gastroesophageal reflux disease)     Glaucoma     Hyperlipidemia LDL goal < 100 5/26/2015    Hypertrophy of prostate with urinary obstruction and other lower urinary tract symptoms (LUTS)     Morbid obesity 5/26/2015    Mumps     as a child    Unspecified sleep apnea     bi pap use    Wears glasses      Past Surgical History:   Procedure Laterality Date    ANKLE ARTHROSCOPY Right 02/23/2017    WITH DEBRIDEMENT     ANKLE ARTHROSCOPY Right 2/23/2017    ANKLE ARTHROSCOPY WITH DEBRIDEMENT  performed by Misha Singh MD at [facility] OR    ANKLE SURGERY Right 11/12/2020    Right foot subtalar fusion,Right percutaneous tendoachilles lengthening, performed through separate incision performed by Rony Borrego MD at [facility] OR    CARDIOVERSION  2012 ?    X2, [facility]    COLONOSCOPY      CORNEAL TRANSPLANT Right multiple    corneal transplant x3    CYSTOSCOPY      TURBT 2010    ELBOW SURGERY Right 1967    FOOT SURGERY Right 07/14/2016    remove hardware--2 screws    PACEMAKER PLACEMENT  12/23/2016

## 2025-04-17 ENCOUNTER — HOSPITAL ENCOUNTER (OUTPATIENT)
Dept: PREADMISSION TESTING | Age: 70
Discharge: HOME | End: 2025-04-21
Payer: COMMERCIAL

## 2025-04-17 VITALS
OXYGEN SATURATION: 97 % | DIASTOLIC BLOOD PRESSURE: 75 MMHG | WEIGHT: 240 LBS | RESPIRATION RATE: 20 BRPM | HEART RATE: 66 BPM | BODY MASS INDEX: 36.37 KG/M2 | SYSTOLIC BLOOD PRESSURE: 106 MMHG | HEIGHT: 68 IN | TEMPERATURE: 98.2 F

## 2025-04-17 DIAGNOSIS — Z01.818 PRE-OP TESTING: ICD-10-CM

## 2025-04-17 LAB
ALBUMIN SERPL-MCNC: 4.3 G/DL (ref 3.5–5.2)
ALP SERPL-CCNC: 81 U/L (ref 40–129)
ALT SERPL-CCNC: 30 U/L (ref 10–50)
ANION GAP SERPL CALCULATED.3IONS-SCNC: 12 MMOL/L (ref 9–16)
AST SERPL-CCNC: 24 U/L (ref 10–50)
BILIRUB SERPL-MCNC: 0.6 MG/DL (ref 0–1.2)
BILIRUB UR QL STRIP: NEGATIVE
BUN SERPL-MCNC: 16 MG/DL (ref 8–23)
CALCIUM SERPL-MCNC: 9.6 MG/DL (ref 8.6–10.4)
CHLORIDE SERPL-SCNC: 103 MMOL/L (ref 98–107)
CLARITY UR: CLEAR
CO2 SERPL-SCNC: 26 MMOL/L (ref 20–31)
COLOR UR: YELLOW
COMMENT: ABNORMAL
CREAT SERPL-MCNC: 1.2 MG/DL (ref 0.7–1.2)
ERYTHROCYTE [DISTWIDTH] IN BLOOD BY AUTOMATED COUNT: 13.9 % (ref 11.5–14.9)
EST. AVERAGE GLUCOSE BLD GHB EST-MCNC: 143 MG/DL
GFR, ESTIMATED: 65 ML/MIN/1.73M2
GLUCOSE SERPL-MCNC: 133 MG/DL (ref 74–99)
GLUCOSE UR STRIP-MCNC: ABNORMAL MG/DL
HBA1C MFR BLD: 6.6 % (ref 4–6)
HCT VFR BLD AUTO: 45.8 % (ref 41–53)
HGB BLD-MCNC: 15.2 G/DL (ref 13.5–17.5)
HGB UR QL STRIP.AUTO: NEGATIVE
KETONES UR STRIP-MCNC: NEGATIVE MG/DL
LEUKOCYTE ESTERASE UR QL STRIP: NEGATIVE
MCH RBC QN AUTO: 29 PG (ref 26–34)
MCHC RBC AUTO-ENTMCNC: 33.2 G/DL (ref 31–37)
MCV RBC AUTO: 87.3 FL (ref 80–100)
NITRITE UR QL STRIP: NEGATIVE
PH UR STRIP: 5 [PH] (ref 5–8)
PLATELET # BLD AUTO: 179 K/UL (ref 150–450)
PMV BLD AUTO: 8.7 FL (ref 6–12)
POTASSIUM SERPL-SCNC: 4.1 MMOL/L (ref 3.7–5.3)
PROT SERPL-MCNC: 6.9 G/DL (ref 6.6–8.7)
PROT UR STRIP-MCNC: NEGATIVE MG/DL
RBC # BLD AUTO: 5.24 M/UL (ref 4.5–5.9)
SODIUM SERPL-SCNC: 141 MMOL/L (ref 136–145)
SP GR UR STRIP: 1.03 (ref 1–1.03)
UROBILINOGEN UR STRIP-ACNC: NORMAL EU/DL (ref 0–1)
WBC OTHER # BLD: 7.1 K/UL (ref 3.5–11)

## 2025-04-17 PROCEDURE — 36415 COLL VENOUS BLD VENIPUNCTURE: CPT

## 2025-04-17 PROCEDURE — 83036 HEMOGLOBIN GLYCOSYLATED A1C: CPT

## 2025-04-17 PROCEDURE — 81003 URINALYSIS AUTO W/O SCOPE: CPT

## 2025-04-17 PROCEDURE — 87641 MR-STAPH DNA AMP PROBE: CPT

## 2025-04-17 PROCEDURE — 85027 COMPLETE CBC AUTOMATED: CPT

## 2025-04-17 PROCEDURE — APPSS45 APP SPLIT SHARED TIME 31-45 MINUTES: Performed by: NURSE PRACTITIONER

## 2025-04-17 PROCEDURE — 80053 COMPREHEN METABOLIC PANEL: CPT

## 2025-04-17 ASSESSMENT — ENCOUNTER SYMPTOMS
SHORTNESS OF BREATH: 0
COUGH: 0
ABDOMINAL PAIN: 0
SORE THROAT: 0
NAUSEA: 0
VOMITING: 0
CONSTIPATION: 0
DIARRHEA: 0

## 2025-04-17 NOTE — H&P (VIEW-ONLY)
urinary incontinence 07/20/2015    Essential hypertension 05/26/2015    Other hyperlipidemia 05/26/2015    Depression 05/26/2015    Atrial flutter (HCC) 08/15/2013     Surgeon requested medical and cardiac clearances.        Based on my personal evaluation of patient including review of patient's chart, no additional clearance requested for scheduled surgery.    Dr. Henderson's office will be responsible for making sure the clearance is obtained and is in the chart for surgery.       Mayra Matson, MILLIE - CNP on 4/17/2025 at 10:36 AM    Total time spent on encounter- PAT provider minutes: 31-40 minutes

## 2025-04-17 NOTE — DISCHARGE INSTRUCTIONS
Pre-op Instructions For Out-Patient Surgery    Medication Instructions:  Please stop herbs and any supplements now (includes vitamins and minerals).    For these medications:  Dulaglutide (Trulicity), Exenatide (Byetta and Bydureon, Liraglutide (Victoza), Lixisenatide (Adlyxin), Semaglutide (Ozempic and Rybelsus), Tirzepatide (Mounjaro, Zepbound)- Stop 1 week prior if taking weekly or 1 day prior if taking every 12 hours or daily.  N/A    Please contact your surgeon and prescribing physician for pre-op instructions for any blood thinners. Aspirin and Meloxicam     If you have inhalers/aerosol treatments at home, please use them the morning of your surgery and bring the inhalers with you to the hospital.    Please take the following medications the morning of your surgery with a sip of water:    Pantoprazole     Surgery Instructions:  After midnight before surgery:  Do not eat or drink anything, including water, mints, gum, and hard candy.  You may brush your teeth without swallowing.  No smoking, chewing tobacco, or street drugs.    Please shower or bathe before surgery.  If you were given Surgical Scrub Chlorhexidine Gluconate Liquid (CHG), please shower the night before and the morning of your surgery following the detailed instructions you received during your pre-admission visit.     Please do not wear any cologne, lotion, powder, deodorant, jewelry, piercings, perfume, makeup, nail polish, hair accessories, or hair spray on the day of surgery.  Wear loose comfortable clothing.    Leave your valuables at home but bring a payment source for any after-surgery prescriptions you plan to fill at [facility].  Bring a storage case for any glasses/contacts.    An adult who is responsible for you MUST drive you home and should be with you for the first 24 hours after surgery.     If having out-patient knee and foot surgeries, please arrange for planned crutches, walker, or

## 2025-04-17 NOTE — H&P
HISTORY and PHYSICAL  [facility]       NAME:  Cong Cui  MRN: [id number]   YOB: 1955   Date: 4/17/2025   Age: 69 y.o.  Gender: male       COMPLAINT AND PRESENT HISTORY:     Cong Cui is 69 y.o.  male, here for preadmission testing related to primary osteoarthritis of left knee with scheduled KNEE TOTAL ARTHROPLASTY LEFT per Dr. Henderson.     Below italics a portion of office visit note by Dr. Henderson dated 04/07/25: Reviewed   History of Present Illness:  This is a 69 y.o. male who presents to the clinic today for evaluation / follow up of severe left knee pain.  Patient is known to us who been followed for his left knee for some time has had multiple injections in the past he has a history of previous right total knee arthroplasty performed per Dr. Graves and doing well with this he is at the point to his activity as his daily living comes significantly restricted having failed physical therapy and injections wishing to proceed with total knee arthroplasty.       UPDATE  Symptoms started about a year ago and progressively worsening.  Pt c/o pain to entire left knee joint.  Describes pain as constant.  Rating pain 7/10.  Takes tylenol with minimal relief.  Standing and walking aggravates pain.  Uses biofreeze helps alleviate symptoms.  Pain does not radiate.  Denies numbness and tingling.  Denies recent fall, injury or trauma.  Denies redness or rash to surgical site.   Denies hx of MRSA infection.      PMHx includes:    Sees Dr. Figueroa for PCP with last visit in March, 2025. Pt has upcoming appt for medical clearance.    A-fib, s/p pacemaker placement (last interrogation November, 2024), HTN, HLD:  Associated medications include: HCTZ, telmisartan, lipitor, low dose aspirin,   Denies recent or current chest pain/pressure, palpitations, SOB, headaches, dizziness. Pt has occasional lower extremity edema.       BP Readings from Last 3 Encounters:   04/17/25 106/75  1,000 mL at 03/10/16 1334    sodium chloride flush 0.9 % injection 10 mL  10 mL IntraVENous 2 times per day Thien Mai MD        sodium chloride flush 0.9 % injection 10 mL  10 mL IntraVENous PRN Thien Mai MD        fentaNYL (SUBLIMAZE) injection 25 mcg  25 mcg IntraVENous Q5 Min PRN Thien Mai MD   Given at 03/10/16 0922    ropivacaine 0.2% (NAROPIN) elastomeric infusion 550 mL  550 mL Infiltration Continuous Thien Mai MD 8 mL/hr at 03/10/16 1229 550 mL at 03/10/16 1229     Notation: Above medications are not currently reconciled at time of signing this H&P note, to be reconciled in pre-op per RN.     Review of Systems   Constitutional:  Negative for chills and fever.   HENT:  Negative for sore throat.    Eyes:  Positive for visual disturbance (glasses).   Respiratory:  Negative for cough and shortness of breath.    Cardiovascular:  Positive for leg swelling. Negative for chest pain and palpitations.   Gastrointestinal:  Negative for abdominal pain, constipation, diarrhea, nausea and vomiting.   Genitourinary:  Negative for dysuria and hematuria.   Musculoskeletal:  Positive for arthralgias, gait problem and joint swelling.   Skin:  Negative for rash and wound.   Neurological:  Negative for speech difficulty.         GENERAL PHYSICAL EXAM     Vitals: /75   Pulse 66   Temp 98.2 °F (36.8 °C) (Temporal)   Resp 20   Ht 1.727 m (5' 8\")   Wt 108.9 kg (240 lb)   SpO2 97%   BMI 36.49 kg/m²  Body mass index is 36.49 kg/m².                              Physical Exam  Constitutional:       General: He is not in acute distress.     Appearance: He is well-developed. He is not ill-appearing.   HENT:      Head: Normocephalic and atraumatic.      Nose: Nose normal.      Mouth/Throat:      Mouth: Mucous membranes are moist.      Pharynx: Oropharynx is clear. No oropharyngeal exudate or posterior oropharyngeal erythema.   Eyes:      General: No scleral icterus.        Right eye: No discharge.

## 2025-04-18 LAB
MRSA, DNA, NASAL: NEGATIVE
SPECIMEN DESCRIPTION: NORMAL

## 2025-04-25 ENCOUNTER — CASE MANAGEMENT (OUTPATIENT)
Age: 70
End: 2025-04-25

## 2025-04-25 NOTE — PROGRESS NOTES
Smoking history: positive for occasional cigar use. Patient advised to quit smoking    Alcohol history: Never drinks    Concerns prior to surgery: Patient has a FWW. No concerns verbalized at this time.    Electronically signed by: Liza Cason RN on 4/25/2025 at 1:26 PM

## 2025-04-28 ENCOUNTER — ANESTHESIA EVENT (OUTPATIENT)
Dept: OPERATING ROOM | Age: 70
End: 2025-04-28
Payer: COMMERCIAL

## 2025-04-28 NOTE — PRE-PROCEDURE INSTRUCTIONS
Nothing to eat after midnight.yes  Are you taking any blood thinners? When was the last day?stopped asa last week  Make sure to use Hibiclens prior to surgery.yes  Remove any jewelry and body piercings.yes  Do you wear glasses? If so, please bring a case to store them in.  Are you having any Covid symptoms?no  Do you have any new rashes, infections, etc. that we should be aware of?no  Do you have a ride home the day of surgery? It cannot be a cab or medical transportation.yes  Verify surgery time and what time to arrive at hospital. 0700/0900

## 2025-04-29 ENCOUNTER — ANESTHESIA (OUTPATIENT)
Dept: OPERATING ROOM | Age: 70
End: 2025-04-29
Payer: COMMERCIAL

## 2025-04-29 ENCOUNTER — APPOINTMENT (OUTPATIENT)
Dept: GENERAL RADIOLOGY | Age: 70
End: 2025-04-29
Attending: ORTHOPAEDIC SURGERY
Payer: COMMERCIAL

## 2025-04-29 ENCOUNTER — HOSPITAL ENCOUNTER (OUTPATIENT)
Age: 70
Discharge: HOME HEALTH | End: 2025-04-29
Attending: ORTHOPAEDIC SURGERY | Admitting: ORTHOPAEDIC SURGERY
Payer: COMMERCIAL

## 2025-04-29 VITALS
BODY MASS INDEX: 36.83 KG/M2 | WEIGHT: 243 LBS | HEART RATE: 90 BPM | SYSTOLIC BLOOD PRESSURE: 112 MMHG | HEIGHT: 68 IN | OXYGEN SATURATION: 94 % | RESPIRATION RATE: 18 BRPM | TEMPERATURE: 97.1 F | DIASTOLIC BLOOD PRESSURE: 68 MMHG

## 2025-04-29 DIAGNOSIS — M17.12 PRIMARY OSTEOARTHRITIS OF LEFT KNEE: Primary | ICD-10-CM

## 2025-04-29 LAB
GLUCOSE BLD-MCNC: 155 MG/DL (ref 75–110)
GLUCOSE BLD-MCNC: 187 MG/DL (ref 75–110)

## 2025-04-29 PROCEDURE — 97162 PT EVAL MOD COMPLEX 30 MIN: CPT

## 2025-04-29 PROCEDURE — 6360000002 HC RX W HCPCS: Performed by: ORTHOPAEDIC SURGERY

## 2025-04-29 PROCEDURE — 7100000000 HC PACU RECOVERY - FIRST 15 MIN: Performed by: ORTHOPAEDIC SURGERY

## 2025-04-29 PROCEDURE — 97116 GAIT TRAINING THERAPY: CPT

## 2025-04-29 PROCEDURE — C1776 JOINT DEVICE (IMPLANTABLE): HCPCS | Performed by: ORTHOPAEDIC SURGERY

## 2025-04-29 PROCEDURE — 64447 NJX AA&/STRD FEMORAL NRV IMG: CPT | Performed by: ANESTHESIOLOGY

## 2025-04-29 PROCEDURE — 82947 ASSAY GLUCOSE BLOOD QUANT: CPT

## 2025-04-29 PROCEDURE — 6360000002 HC RX W HCPCS: Performed by: NURSE ANESTHETIST, CERTIFIED REGISTERED

## 2025-04-29 PROCEDURE — 97535 SELF CARE MNGMENT TRAINING: CPT

## 2025-04-29 PROCEDURE — 3700000000 HC ANESTHESIA ATTENDED CARE: Performed by: ORTHOPAEDIC SURGERY

## 2025-04-29 PROCEDURE — C1713 ANCHOR/SCREW BN/BN,TIS/BN: HCPCS | Performed by: ORTHOPAEDIC SURGERY

## 2025-04-29 PROCEDURE — 2500000003 HC RX 250 WO HCPCS: Performed by: NURSE ANESTHETIST, CERTIFIED REGISTERED

## 2025-04-29 PROCEDURE — 6370000000 HC RX 637 (ALT 250 FOR IP): Performed by: ORTHOPAEDIC SURGERY

## 2025-04-29 PROCEDURE — 3600000003 HC SURGERY LEVEL 3 BASE: Performed by: ORTHOPAEDIC SURGERY

## 2025-04-29 PROCEDURE — 73560 X-RAY EXAM OF KNEE 1 OR 2: CPT

## 2025-04-29 PROCEDURE — 97110 THERAPEUTIC EXERCISES: CPT

## 2025-04-29 PROCEDURE — 7100000001 HC PACU RECOVERY - ADDTL 15 MIN: Performed by: ORTHOPAEDIC SURGERY

## 2025-04-29 PROCEDURE — 2580000003 HC RX 258: Performed by: ANESTHESIOLOGY

## 2025-04-29 PROCEDURE — 2500000003 HC RX 250 WO HCPCS: Performed by: ORTHOPAEDIC SURGERY

## 2025-04-29 PROCEDURE — 2709999900 HC NON-CHARGEABLE SUPPLY: Performed by: ORTHOPAEDIC SURGERY

## 2025-04-29 PROCEDURE — 3700000001 HC ADD 15 MINUTES (ANESTHESIA): Performed by: ORTHOPAEDIC SURGERY

## 2025-04-29 PROCEDURE — 3600000013 HC SURGERY LEVEL 3 ADDTL 15MIN: Performed by: ORTHOPAEDIC SURGERY

## 2025-04-29 PROCEDURE — 2580000003 HC RX 258: Performed by: ORTHOPAEDIC SURGERY

## 2025-04-29 PROCEDURE — 97166 OT EVAL MOD COMPLEX 45 MIN: CPT

## 2025-04-29 PROCEDURE — 6360000002 HC RX W HCPCS: Performed by: ANESTHESIOLOGY

## 2025-04-29 PROCEDURE — 97530 THERAPEUTIC ACTIVITIES: CPT

## 2025-04-29 PROCEDURE — 2720000010 HC SURG SUPPLY STERILE: Performed by: ORTHOPAEDIC SURGERY

## 2025-04-29 DEVICE — IMPLANTABLE DEVICE
Type: IMPLANTABLE DEVICE | Site: KNEE | Status: FUNCTIONAL
Brand: VANGUARD® KNEE SYSTEM

## 2025-04-29 DEVICE — IMPLANTABLE DEVICE
Type: IMPLANTABLE DEVICE | Site: KNEE | Status: FUNCTIONAL
Brand: VANGUARD KNEE SYSTEM

## 2025-04-29 DEVICE — IMPLANTABLE DEVICE
Type: IMPLANTABLE DEVICE | Site: KNEE | Status: FUNCTIONAL
Brand: BIOMET® KNEE SYSTEM

## 2025-04-29 DEVICE — CEMENT BNE 40GM HI VISC RADPQ FOR REV SURG: Type: IMPLANTABLE DEVICE | Site: KNEE | Status: FUNCTIONAL

## 2025-04-29 RX ORDER — SCOPOLAMINE 1 MG/3D
1 PATCH, EXTENDED RELEASE TRANSDERMAL ONCE
Status: DISCONTINUED | OUTPATIENT
Start: 2025-04-29 | End: 2025-04-29 | Stop reason: HOSPADM

## 2025-04-29 RX ORDER — ASPIRIN 81 MG/1
81 TABLET ORAL 2 TIMES DAILY
Status: DISCONTINUED | OUTPATIENT
Start: 2025-04-29 | End: 2025-04-29 | Stop reason: HOSPADM

## 2025-04-29 RX ORDER — ACETAMINOPHEN 500 MG
1000 TABLET ORAL ONCE
Status: COMPLETED | OUTPATIENT
Start: 2025-04-29 | End: 2025-04-29

## 2025-04-29 RX ORDER — CALCIUM CHLORIDE 100 MG/ML
INJECTION INTRAVENOUS; INTRAVENTRICULAR PRN
Status: DISCONTINUED | OUTPATIENT
Start: 2025-04-29 | End: 2025-04-29 | Stop reason: ALTCHOICE

## 2025-04-29 RX ORDER — PROPOFOL 10 MG/ML
INJECTION, EMULSION INTRAVENOUS
Status: DISCONTINUED | OUTPATIENT
Start: 2025-04-29 | End: 2025-04-29 | Stop reason: SDUPTHER

## 2025-04-29 RX ORDER — FENTANYL CITRATE 0.05 MG/ML
25 INJECTION, SOLUTION INTRAMUSCULAR; INTRAVENOUS EVERY 5 MIN PRN
Status: DISCONTINUED | OUTPATIENT
Start: 2025-04-29 | End: 2025-04-29 | Stop reason: HOSPADM

## 2025-04-29 RX ORDER — FENTANYL CITRATE 50 UG/ML
INJECTION, SOLUTION INTRAMUSCULAR; INTRAVENOUS
Status: COMPLETED | OUTPATIENT
Start: 2025-04-29 | End: 2025-04-29

## 2025-04-29 RX ORDER — SODIUM CHLORIDE 9 MG/ML
INJECTION, SOLUTION INTRAVENOUS CONTINUOUS
Status: DISCONTINUED | OUTPATIENT
Start: 2025-04-29 | End: 2025-04-29 | Stop reason: HOSPADM

## 2025-04-29 RX ORDER — SODIUM CHLORIDE 0.9 % (FLUSH) 0.9 %
5-40 SYRINGE (ML) INJECTION EVERY 12 HOURS SCHEDULED
Status: DISCONTINUED | OUTPATIENT
Start: 2025-04-29 | End: 2025-04-29 | Stop reason: HOSPADM

## 2025-04-29 RX ORDER — LIDOCAINE HYDROCHLORIDE 20 MG/ML
INJECTION, SOLUTION INFILTRATION; PERINEURAL
Status: DISCONTINUED | OUTPATIENT
Start: 2025-04-29 | End: 2025-04-29 | Stop reason: SDUPTHER

## 2025-04-29 RX ORDER — ONDANSETRON 2 MG/ML
4 INJECTION INTRAMUSCULAR; INTRAVENOUS EVERY 6 HOURS PRN
Status: DISCONTINUED | OUTPATIENT
Start: 2025-04-29 | End: 2025-04-29 | Stop reason: HOSPADM

## 2025-04-29 RX ORDER — SODIUM CHLORIDE 9 MG/ML
INJECTION, SOLUTION INTRAVENOUS PRN
Status: DISCONTINUED | OUTPATIENT
Start: 2025-04-29 | End: 2025-04-29 | Stop reason: HOSPADM

## 2025-04-29 RX ORDER — MIDAZOLAM HYDROCHLORIDE 2 MG/2ML
INJECTION, SOLUTION INTRAMUSCULAR; INTRAVENOUS
Status: DISCONTINUED | OUTPATIENT
Start: 2025-04-29 | End: 2025-04-29 | Stop reason: SDUPTHER

## 2025-04-29 RX ORDER — HYDRALAZINE HYDROCHLORIDE 20 MG/ML
10 INJECTION INTRAMUSCULAR; INTRAVENOUS
Status: DISCONTINUED | OUTPATIENT
Start: 2025-04-29 | End: 2025-04-29 | Stop reason: HOSPADM

## 2025-04-29 RX ORDER — DEXAMETHASONE SODIUM PHOSPHATE 10 MG/ML
10 INJECTION, SOLUTION INTRAMUSCULAR; INTRAVENOUS ONCE
Status: COMPLETED | OUTPATIENT
Start: 2025-04-29 | End: 2025-04-29

## 2025-04-29 RX ORDER — CHLORHEXIDINE GLUCONATE ORAL RINSE 1.2 MG/ML
SOLUTION DENTAL
Status: DISCONTINUED
Start: 2025-04-29 | End: 2025-04-29 | Stop reason: HOSPADM

## 2025-04-29 RX ORDER — BUPIVACAINE HYDROCHLORIDE 7.5 MG/ML
INJECTION, SOLUTION INTRASPINAL
Status: COMPLETED | OUTPATIENT
Start: 2025-04-29 | End: 2025-04-29

## 2025-04-29 RX ORDER — DEXAMETHASONE SODIUM PHOSPHATE 4 MG/ML
INJECTION, SOLUTION INTRA-ARTICULAR; INTRALESIONAL; INTRAMUSCULAR; INTRAVENOUS; SOFT TISSUE
Status: DISCONTINUED | OUTPATIENT
Start: 2025-04-29 | End: 2025-04-29 | Stop reason: SDUPTHER

## 2025-04-29 RX ORDER — LIDOCAINE HYDROCHLORIDE 10 MG/ML
1 INJECTION, SOLUTION EPIDURAL; INFILTRATION; INTRACAUDAL; PERINEURAL
Status: DISCONTINUED | OUTPATIENT
Start: 2025-04-29 | End: 2025-04-29 | Stop reason: HOSPADM

## 2025-04-29 RX ORDER — OXYCODONE HYDROCHLORIDE 10 MG/1
10 TABLET ORAL EVERY 4 HOURS PRN
Status: DISCONTINUED | OUTPATIENT
Start: 2025-04-29 | End: 2025-04-29 | Stop reason: HOSPADM

## 2025-04-29 RX ORDER — LABETALOL HYDROCHLORIDE 5 MG/ML
10 INJECTION, SOLUTION INTRAVENOUS
Status: DISCONTINUED | OUTPATIENT
Start: 2025-04-29 | End: 2025-04-29 | Stop reason: HOSPADM

## 2025-04-29 RX ORDER — SODIUM CHLORIDE 0.9 % (FLUSH) 0.9 %
5-40 SYRINGE (ML) INJECTION PRN
Status: DISCONTINUED | OUTPATIENT
Start: 2025-04-29 | End: 2025-04-29 | Stop reason: HOSPADM

## 2025-04-29 RX ORDER — ASPIRIN 81 MG/1
81 TABLET ORAL 2 TIMES DAILY
Qty: 60 TABLET | Refills: 3 | Status: SHIPPED | OUTPATIENT
Start: 2025-04-29

## 2025-04-29 RX ORDER — DIPHENHYDRAMINE HYDROCHLORIDE 50 MG/ML
12.5 INJECTION, SOLUTION INTRAMUSCULAR; INTRAVENOUS
Status: DISCONTINUED | OUTPATIENT
Start: 2025-04-29 | End: 2025-04-29 | Stop reason: HOSPADM

## 2025-04-29 RX ORDER — GABAPENTIN 600 MG/1
600 TABLET ORAL ONCE
Status: COMPLETED | OUTPATIENT
Start: 2025-04-29 | End: 2025-04-29

## 2025-04-29 RX ORDER — NALOXONE HYDROCHLORIDE 0.4 MG/ML
INJECTION, SOLUTION INTRAMUSCULAR; INTRAVENOUS; SUBCUTANEOUS PRN
Status: DISCONTINUED | OUTPATIENT
Start: 2025-04-29 | End: 2025-04-29 | Stop reason: HOSPADM

## 2025-04-29 RX ORDER — OXYCODONE AND ACETAMINOPHEN 5; 325 MG/1; MG/1
1 TABLET ORAL EVERY 4 HOURS PRN
Qty: 42 TABLET | Refills: 0 | Status: SHIPPED | OUTPATIENT
Start: 2025-04-29 | End: 2025-05-06

## 2025-04-29 RX ORDER — ONDANSETRON 2 MG/ML
4 INJECTION INTRAMUSCULAR; INTRAVENOUS
Status: DISCONTINUED | OUTPATIENT
Start: 2025-04-29 | End: 2025-04-29 | Stop reason: HOSPADM

## 2025-04-29 RX ORDER — METOCLOPRAMIDE HYDROCHLORIDE 5 MG/ML
10 INJECTION INTRAMUSCULAR; INTRAVENOUS
Status: DISCONTINUED | OUTPATIENT
Start: 2025-04-29 | End: 2025-04-29 | Stop reason: HOSPADM

## 2025-04-29 RX ORDER — FENTANYL CITRATE 50 UG/ML
INJECTION, SOLUTION INTRAMUSCULAR; INTRAVENOUS
Status: DISCONTINUED | OUTPATIENT
Start: 2025-04-29 | End: 2025-04-29 | Stop reason: SDUPTHER

## 2025-04-29 RX ORDER — ACETAMINOPHEN 325 MG/1
650 TABLET ORAL
Status: DISCONTINUED | OUTPATIENT
Start: 2025-04-29 | End: 2025-04-29 | Stop reason: HOSPADM

## 2025-04-29 RX ORDER — SODIUM CHLORIDE, SODIUM LACTATE, POTASSIUM CHLORIDE, CALCIUM CHLORIDE 600; 310; 30; 20 MG/100ML; MG/100ML; MG/100ML; MG/100ML
INJECTION, SOLUTION INTRAVENOUS CONTINUOUS
Status: DISCONTINUED | OUTPATIENT
Start: 2025-04-29 | End: 2025-04-29 | Stop reason: HOSPADM

## 2025-04-29 RX ORDER — ROPIVACAINE HYDROCHLORIDE 5 MG/ML
INJECTION, SOLUTION EPIDURAL; INFILTRATION; PERINEURAL
Status: DISCONTINUED | OUTPATIENT
Start: 2025-04-29 | End: 2025-04-29 | Stop reason: SDUPTHER

## 2025-04-29 RX ORDER — ONDANSETRON 2 MG/ML
INJECTION INTRAMUSCULAR; INTRAVENOUS
Status: DISCONTINUED | OUTPATIENT
Start: 2025-04-29 | End: 2025-04-29 | Stop reason: SDUPTHER

## 2025-04-29 RX ORDER — MEPERIDINE HYDROCHLORIDE 25 MG/ML
12.5 INJECTION INTRAMUSCULAR; INTRAVENOUS; SUBCUTANEOUS EVERY 5 MIN PRN
Status: DISCONTINUED | OUTPATIENT
Start: 2025-04-29 | End: 2025-04-29 | Stop reason: HOSPADM

## 2025-04-29 RX ORDER — OXYCODONE HYDROCHLORIDE 5 MG/1
5 TABLET ORAL EVERY 4 HOURS PRN
Status: DISCONTINUED | OUTPATIENT
Start: 2025-04-29 | End: 2025-04-29 | Stop reason: HOSPADM

## 2025-04-29 RX ORDER — ACETAMINOPHEN 325 MG/1
650 TABLET ORAL EVERY 6 HOURS
Status: DISCONTINUED | OUTPATIENT
Start: 2025-04-29 | End: 2025-04-29 | Stop reason: HOSPADM

## 2025-04-29 RX ORDER — TRANEXAMIC ACID 100 MG/ML
INJECTION, SOLUTION INTRAVENOUS
Status: DISCONTINUED | OUTPATIENT
Start: 2025-04-29 | End: 2025-04-29 | Stop reason: SDUPTHER

## 2025-04-29 RX ADMIN — ONDANSETRON 4 MG: 2 INJECTION, SOLUTION INTRAMUSCULAR; INTRAVENOUS at 08:58

## 2025-04-29 RX ADMIN — FENTANYL CITRATE 25 MCG: 50 INJECTION, SOLUTION INTRAMUSCULAR; INTRAVENOUS at 09:31

## 2025-04-29 RX ADMIN — FENTANYL CITRATE 15 MCG: 50 INJECTION INTRAMUSCULAR; INTRAVENOUS at 09:04

## 2025-04-29 RX ADMIN — ACETAMINOPHEN 650 MG: 325 TABLET ORAL at 13:14

## 2025-04-29 RX ADMIN — TRANEXAMIC ACID 1000 MG: 100 INJECTION, SOLUTION INTRAVENOUS at 10:12

## 2025-04-29 RX ADMIN — SODIUM CHLORIDE, SODIUM LACTATE, POTASSIUM CHLORIDE, CALCIUM CHLORIDE: 600; 310; 30; 20 INJECTION, SOLUTION INTRAVENOUS at 09:25

## 2025-04-29 RX ADMIN — LIDOCAINE HYDROCHLORIDE 15 ML: 20 INJECTION, SOLUTION INFILTRATION; PERINEURAL at 11:00

## 2025-04-29 RX ADMIN — GABAPENTIN 600 MG: 600 TABLET, FILM COATED ORAL at 07:58

## 2025-04-29 RX ADMIN — DEXAMETHASONE SODIUM PHOSPHATE 8 MG: 4 INJECTION INTRA-ARTICULAR; INTRALESIONAL; INTRAMUSCULAR; INTRAVENOUS; SOFT TISSUE at 09:43

## 2025-04-29 RX ADMIN — Medication 2 G: at 09:07

## 2025-04-29 RX ADMIN — Medication 2000 MG: at 15:36

## 2025-04-29 RX ADMIN — BUPIVACAINE HYDROCHLORIDE IN DEXTROSE 12 MG: 7.5 INJECTION, SOLUTION SUBARACHNOID at 09:04

## 2025-04-29 RX ADMIN — FENTANYL CITRATE 25 MCG: 50 INJECTION, SOLUTION INTRAMUSCULAR; INTRAVENOUS at 10:00

## 2025-04-29 RX ADMIN — DEXAMETHASONE SODIUM PHOSPHATE 10 MG: 10 INJECTION, SOLUTION INTRAMUSCULAR; INTRAVENOUS at 08:31

## 2025-04-29 RX ADMIN — ACETAMINOPHEN 1000 MG: 500 TABLET ORAL at 07:58

## 2025-04-29 RX ADMIN — SODIUM CHLORIDE, SODIUM LACTATE, POTASSIUM CHLORIDE, AND CALCIUM CHLORIDE: .6; .31; .03; .02 INJECTION, SOLUTION INTRAVENOUS at 12:33

## 2025-04-29 RX ADMIN — SODIUM CHLORIDE: 9 INJECTION, SOLUTION INTRAVENOUS at 08:27

## 2025-04-29 RX ADMIN — ROPIVACAINE HYDROCHLORIDE 20 ML: 5 INJECTION EPIDURAL; INFILTRATION; PERINEURAL at 11:00

## 2025-04-29 RX ADMIN — MIDAZOLAM HYDROCHLORIDE 2 MG: 1 INJECTION, SOLUTION INTRAMUSCULAR; INTRAVENOUS at 08:58

## 2025-04-29 RX ADMIN — TRANEXAMIC ACID 1000 MG: 100 INJECTION, SOLUTION INTRAVENOUS at 09:21

## 2025-04-29 RX ADMIN — FENTANYL CITRATE 35 MCG: 50 INJECTION, SOLUTION INTRAMUSCULAR; INTRAVENOUS at 08:59

## 2025-04-29 RX ADMIN — PROPOFOL 125 MCG/KG/MIN: 10 INJECTION, EMULSION INTRAVENOUS at 09:06

## 2025-04-29 ASSESSMENT — PAIN DESCRIPTION - LOCATION
LOCATION: KNEE
LOCATION: KNEE

## 2025-04-29 ASSESSMENT — PAIN DESCRIPTION - DESCRIPTORS
DESCRIPTORS: ACHING

## 2025-04-29 ASSESSMENT — PAIN SCALES - GENERAL
PAINLEVEL_OUTOF10: 3
PAINLEVEL_OUTOF10: 0
PAINLEVEL_OUTOF10: 0
PAINLEVEL_OUTOF10: 2

## 2025-04-29 ASSESSMENT — PAIN DESCRIPTION - ORIENTATION
ORIENTATION: LEFT
ORIENTATION: LEFT

## 2025-04-29 ASSESSMENT — PAIN - FUNCTIONAL ASSESSMENT
PAIN_FUNCTIONAL_ASSESSMENT: 0-10
PAIN_FUNCTIONAL_ASSESSMENT: 0-10

## 2025-04-29 NOTE — ANESTHESIA PRE PROCEDURE
Hyperlipidemia LDL goal < 100 05/26/2015   • Hypertrophy of prostate with urinary obstruction and other lower urinary tract symptoms (LUTS)    • Morbid obesity (HCC) 05/26/2015   • Mumps     as a child   • Prolonged emergence from general anesthesia    • Unspecified sleep apnea     bi pap use   • Wears glasses        Past Surgical History:        Procedure Laterality Date   • ANKLE ARTHROSCOPY Right 02/23/2017    WITH DEBRIDEMENT    • ANKLE ARTHROSCOPY Right 2/23/2017    ANKLE ARTHROSCOPY WITH DEBRIDEMENT  performed by Misha Singh MD at [facility]   • ANKLE SURGERY Right 11/12/2020    Right foot subtalar fusion,Right percutaneous tendoachilles lengthening, performed through separate incision performed by Rony Borrego MD at [facility]   • CARDIOVERSION  2012 ?    X2, [facility]   • COLONOSCOPY     • CORNEAL TRANSPLANT Right multiple    corneal transplant x3   • CYSTOSCOPY      TURBT 2010   • ELBOW SURGERY Right 1967   • FOOT SURGERY Right 07/14/2016    remove hardware--2 screws   • PACEMAKER PLACEMENT  12/23/2016    [employer], [phone], Dr. Pascual Dhaliwal   • TOTAL KNEE ARTHROPLASTY Right 2015   • VASECTOMY         Social History:    Social History     Tobacco Use   • Smoking status: Some Days     Types: Cigars   • Smokeless tobacco: Former     Types: Chew   • Tobacco comments:     quit 2000   Substance Use Topics   • Alcohol use: No                                Ready to quit: Not Answered  Counseling given: Not Answered  Tobacco comments: quit 2000      Vital Signs (Current): There were no vitals filed for this visit.                                           BP Readings from Last 3 Encounters:   04/22/25 104/62   04/17/25 106/75   03/21/25 104/78       NPO Status:                                                                                 BMI:   Wt Readings from Last 3 Encounters:   04/22/25 110.2 kg (243 lb)   04/17/25 108.9 kg (240 lb)   04/07/25 112.5 kg (248 lb)     There is no

## 2025-04-29 NOTE — FLOWSHEET NOTE
Patient admitted to room 2037 per bed from PACU.  VS assessment and orientation to the room and call system completed.  Wife at bedside.  Plan for the day and orders reviewed with the patient and his wife.  Patient measured or and provided witht 2 pairs of thigh hi anti em stockings.  Also instructed on and provided with an IS.

## 2025-04-29 NOTE — ANESTHESIA POSTPROCEDURE EVALUATION
Department of Anesthesiology  Postprocedure Note    Patient: Cong Cui  MRN: [id number]  YOB: 1955  Date of evaluation: 4/29/2025    Procedure Summary       Date: 04/29/25 Room / Location: [address]    Anesthesia Start: 0857 Anesthesia Stop: 1044    Procedure: KNEE TOTAL ARTHROPLASTY LEFT (Left: Knee) Diagnosis:       Primary osteoarthritis of left knee      (Primary osteoarthritis of left knee [M17.12])    Surgeons: Armaan Henderson MD Responsible Provider: Kem Mckoy MD    Anesthesia Type: Spinal ASA Status: 3            Anesthesia Type: Spinal    Nelson Phase I: Nelson Score: 8    Nelson Phase II:      Anesthesia Post Evaluation    Comments: POST- ANESTHESIA EVALUATION       Pt Name: Cong Cui  MRN: [id number]  YOB: 1955  Date of evaluation: 4/29/2025  Time:  12:17 PM      /62   Pulse 89   Temp 96.9 °F (36.1 °C)   Resp 17   Ht 1.727 m (5' 8\")   Wt 110.2 kg (243 lb)   SpO2 95%   BMI 36.95 kg/m²      Consciousness Level  Awake  Cardiopulmonary Status  Stable  Pain Adequately Treated YES  Nausea / Vomiting  NO  Adequate Hydration  YES  Anesthesia Related Complications NONE      Electronically signed by Kem Mckoy MD on 4/29/2025 at 12:17 PM           No notable events documented.

## 2025-04-29 NOTE — PROGRESS NOTES
CLINICAL PHARMACY NOTE: MEDS TO BEDS    Total # of Prescriptions Filled: 1   The following medications were delivered to the patient:  Oxycodone/APAP 5-325MG Tablets     Additional Documentation:  Delivered to the PT at 3:17PM 4/29/25

## 2025-04-29 NOTE — PROGRESS NOTES
[facility]   Occupational Therapy Evaluation  Date: 25  Patient Name: Cong Cui       Room: 7-01  MRN: [id number]  Account: [id number]   : 1955  (69 y.o.) Gender: male     Discharge Recommendations:  The patient may need non-skilled ADL assistance after discharge.     OT Equipment Recommendations  Other: pt denies needs    Referring Practitioner: Armaan Henderson MD  Diagnosis: Primary osteoarthritis of left knee        Treatment Diagnosis: impaired self care status    Past Medical History:  has a past medical history of 25: States very controlled, Allergic rhinitis, Anxiety, Arrhythmia, Arthritis, Atrial fibrillation (HCC), Benign hypertension, Blind right eye, Caffeine use, Cough, Depression, Diabetes mellitus (HCC), Glaucoma, Hyperlipidemia LDL goal < 100, Hypertrophy of prostate with urinary obstruction and other lower urinary tract symptoms (LUTS), Morbid obesity (HCC), Mumps, Prolonged emergence from general anesthesia, Unspecified sleep apnea, and Wears glasses.    Past Surgical History:   has a past surgical history that includes Vasectomy; Cystocopy; Cardioversion ( ?); Colonoscopy; Elbow surgery (Right, 1967); Total knee arthroplasty (Right, 2015); Corneal transplant (Right, multiple); Foot surgery (Right, 2016); Ankle arthroscopy (Right, 2017); Ankle arthroscopy (Right, 2017); pacemaker placement (2016); Ankle surgery (Right, 2020); Total knee arthroplasty (Right, ); and Total knee arthroplasty (Left, 2025).    Restrictions  Restrictions/Precautions  Restrictions/Precautions: Surgical protocol, Fall Risk, Weight Bearing (IV right wrist)  Required Braces or Orthoses?: No  Implants Present? : Metal implants, Pacemaker (R TKR, L TKR, screws in right ankle)  Lower Extremity Weight Bearing Restrictions  Left Lower Extremity Weight Bearing: Weight Bearing As Tolerated (full weight bearing as tolerated)

## 2025-04-29 NOTE — PLAN OF CARE
Problem: Chronic Conditions and Co-morbidities  Goal: Patient's chronic conditions and co-morbidity symptoms are monitored and maintained or improved  Outcome: Adequate for Discharge  Flowsheets (Taken 4/29/2025 1215)  Care Plan - Patient's Chronic Conditions and Co-Morbidity Symptoms are Monitored and Maintained or Improved:   Monitor and assess patient's chronic conditions and comorbid symptoms for stability, deterioration, or improvement   Collaborate with multidisciplinary team to address chronic and comorbid conditions and prevent exacerbation or deterioration   Update acute care plan with appropriate goals if chronic or comorbid symptoms are exacerbated and prevent overall improvement and discharge     Problem: Discharge Planning  Goal: Discharge to home or other facility with appropriate resources  Outcome: Adequate for Discharge  Flowsheets (Taken 4/29/2025 1215)  Discharge to home or other facility with appropriate resources:   Identify barriers to discharge with patient and caregiver   Arrange for needed discharge resources and transportation as appropriate   Identify discharge learning needs (meds, wound care, etc)     Problem: Pain  Goal: Verbalizes/displays adequate comfort level or baseline comfort level  Outcome: Adequate for Discharge     Problem: Safety - Adult  Goal: Free from fall injury  Outcome: Adequate for Discharge

## 2025-04-29 NOTE — DISCHARGE INSTR - COC
Continuity of Care Form    Patient Name: Cong Cui   :  1955  MRN:  [id number]    Admit date:  2025  Discharge date:  25    Code Status Order: Full Code   Advance Directives:     Admitting Physician:  Armaan Henderson MD  PCP: Mignon Figueroa MD    Discharging Nurse: RICKY Real  Discharging Hospital Unit/Room#: [facility] OR Pool/NONE  Discharging Unit Phone Number: [phone]    Emergency Contact:   Extended Emergency Contact Information  Primary Emergency Contact: Louise Cui  Address: [address] [place] [address] [facility]  Home Phone: [phone]  Mobile Phone: [phone]  Relation: Spouse  Secondary Emergency Contact: Dakota Cui  Address: [place] [address]  Home Phone: [phone]  Mobile Phone: [phone]  Relation: Child    Past Surgical History:  Past Surgical History:   Procedure Laterality Date    ANKLE ARTHROSCOPY Right 2017    WITH DEBRIDEMENT     ANKLE ARTHROSCOPY Right 2017    ANKLE ARTHROSCOPY WITH DEBRIDEMENT  performed by M[facility] Singh MD at [facility] OR    ANKLE SURGERY Right 2020    Right foot subtalar fusion,Right percutaneous tendoachilles lengthening, performed through separate incision performed by Rony Borrego MD at [facility] OR    CARDIOVERSION  2012 ?    X2, [facility]    COLONOSCOPY      CORNEAL TRANSPLANT Right multiple    corneal transplant x3    CYSTOSCOPY      TURBT 2010    ELBOW SURGERY Right 1967    FOOT SURGERY Right 2016    remove hardware--2 screws    PACEMAKER PLACEMENT  2016    [employer], [phone], Dr. Pascual Dhaliwal    TOTAL KNEE ARTHROPLASTY Right     VASECTOMY         Immunization History:   Immunization History   Administered Date(s) Administered    COVID-19, MODERNA, , (age 12y+), IM, 50mcg/0.5mL 2023    COVID-19, PFIZER Bivalent, DO NOT Dilute, (age 12y+), IM, 30 mcg/0.3 mL 2023    COVID-19, PFIZER GRAY top, DO NOT

## 2025-04-29 NOTE — PROGRESS NOTES
Physical Therapy  [facility]   Physical Therapy Evaluation  Date: 25  Patient Name: Cong Cui       Room: 7-  MRN: [id number]  Account: [id number]   : 1955  (69 y.o.) Gender: male     Discharge Recommendations:  Discharge Recommendations: Home with assist PRN, Patient would benefit from continued therapy after discharge     PT D/C Equipment  Equipment Needed: No  PT Equipment Recommendations  Equipment Needed: No     Past Medical History:  has a past medical history of 25: States very controlled, Allergic rhinitis, Anxiety, Arrhythmia, Arthritis, Atrial fibrillation (HCC), Benign hypertension, Blind right eye, Caffeine use, Cough, Depression, Diabetes mellitus (HCC), Glaucoma, Hyperlipidemia LDL goal < 100, Hypertrophy of prostate with urinary obstruction and other lower urinary tract symptoms (LUTS), Morbid obesity (HCC), Mumps, Prolonged emergence from general anesthesia, Unspecified sleep apnea, and Wears glasses.  Past Surgical History:   has a past surgical history that includes Vasectomy; Cystocopy; Cardioversion ( ?); Colonoscopy; Elbow surgery (Right, ); Total knee arthroplasty (Right, ); Corneal transplant (Right, multiple); Foot surgery (Right, 2016); Ankle arthroscopy (Right, 2017); Ankle arthroscopy (Right, 2017); pacemaker placement (2016); Ankle surgery (Right, 2020); Total knee arthroplasty (Right, ); and Total knee arthroplasty (Left, 2025).    Subjective  Subjective  Subjective: Pt pleasant and cooperative with therapy     General  Patient assessed for rehabilitation services?: Yes  Additional Pertinent Hx: Per H and P 25: Cong Cui is 69 y.o.  male, here for preadmission testing related to primary osteoarthritis of left knee with scheduled KNEE TOTAL ARTHROPLASTY LEFT per Dr. Henderson.      Below italics a portion of office visit note by Dr. Henderson dated 25: Reviewed

## 2025-04-29 NOTE — DISCHARGE INSTRUCTIONS
swelling or odor from incision site.  Temperature above 101 degrees.  Pain not controlled by prescribed medications.  Calf tenderness, swelling, or redness.  Shortness of breath or chest pain.  If you cannot urinate and have been consuming liquids  Any incision or surgical-related concerns.  Call surgeon with concerns PRIOR TO going to hospital.    Normal Conditions:  Swelling in the operative leg: this should reduce over time.  Bruising behind the knee and around surgical area.  Some post-operative pain.    Constipation related to pain medications/decreased mobility.  (Increase fiber & water intake.)   Slight warmth of operative leg.    Fatigue and moderate pain after therapy.    Numbness near the incision site.  Nausea - take pain medications with food.  Cut back on pain medication.    NOTE: Remember to go to follow-up orthopedic appointment with surgeon

## 2025-04-29 NOTE — INTERVAL H&P NOTE
Update History & Physical    The patient's History and Physical of April 17, 25 was reviewed with the patient and I examined the patient. There was no change. The surgical site was confirmed by the patient and me. Pt undergoing for left knee with scheduled KNEE TOTAL ARTHROPLASTY LEFT per Dr. Henderson.   Pt denies fever/chills, chest pain or SOB   Pt Npo since the past midnight , pt took his am medication with sip of water   Pt denies hx  MRSA infection   Pt denies hx of blood clots  Pt stopped taking ASA one week ago   Hx of prolonged emergence from anesthesia. Otherwise, denies personal and family history of complications with anesthesia  Physical exam remain unchanged including cardiac and pulmonary assessment  See nursing flow sheet for vital sings     Lab Results   Component Value Date    WBC 7.1 04/17/2025    HGB 15.2 04/17/2025    HCT 45.8 04/17/2025    MCV 87.3 04/17/2025     04/17/2025     Lab Results   Component Value Date/Time     04/17/2025 10:26 AM    K 4.1 04/17/2025 10:26 AM     04/17/2025 10:26 AM    CO2 26 04/17/2025 10:26 AM    BUN 16 04/17/2025 10:26 AM    CREATININE 1.2 04/17/2025 10:26 AM    GLUCOSE 133 04/17/2025 10:26 AM    CALCIUM 9.6 04/17/2025 10:26 AM    LABGLOM 65 04/17/2025 10:26 AM      Electronically signed by MILLIE Ann CNP on 4/29/2025 at 8:07 AM

## 2025-04-29 NOTE — OP NOTE
Operative Note      Patient: Cong Cui  YOB: 1955  MRN: [id number]    Date of Procedure: 4/29/2025    Pre-Op Diagnosis Codes:      * Primary osteoarthritis of left knee [M17.12]    Post-Op Diagnosis: Same       Procedure(s):  KNEE TOTAL ARTHROPLASTY LEFT    Surgeon(s):  Armaan Henderson MD    Assistant:   Resident: Tang Ovalle DO Joe Bielecki, CST    Anesthesia: Spinal    Estimated Blood Loss (mL): Minimal    Complications: None    Specimens:   * No specimens in log *    Implants:  Implant Name Type Inv. Item Serial No.  Lot No. LRB No. Used Action   CEMENT BNE 40GM HI VISC RADPQ FOR REV SURG - [id number]  CEMENT BNE 40GM HI VISC RADPQ FOR REV SURG  [employer]- [id number] Left 1 Implanted   CEMENT BNE 40GM HI VISC RADPQ FOR REV SURG - [id number]  CEMENT BNE 40GM HI VISC RADPQ FOR REV SURG  [employer]- [id number] Left 1 Implanted   COMPONENT FEM 65MM L KNEE CO CHROM NP CRUCE RET INTLOK LUIS - [id number]  COMPONENT FEM 65MM L KNEE CO CHROM NP CRUCE RET INTLOK LUIS  [employer]- [id number] Left 1 Implanted   TRAY TIB L75MM KNEE CO CHROM FIN MOD INTLOK LUIS VANGUARD - [id number]  TRAY TIB L75MM KNEE CO CHROM FIN MOD INTLOK LUIS [employer]  [employer]- [id number] Left 1 Implanted   COMPONENT PAT [id number] THK8.6MM THN KNEE POLY 3 PEG SER A - [id number]  COMPONENT PAT [id number] THK8.6MM THN KNEE POLY 3 PEG SER A  [facility] [id number] Left 1 Implanted   BEARING VNGD ANT STAB 13X75 - [id number]  BEARING VNGD ANT STAB 13X75  [facility] [id number] Left 1 Implanted         Drains: * No LDAs found *    Findings:  Infection Present At Time Of Surgery (PATOS) (choose all levels that have infection present):  No infection present  Other Findings: Severe DJD primarily lateral compartment left knee    Detailed Description of Procedure:       Patient is a 69 y.o. male with a long standing

## 2025-04-29 NOTE — ANESTHESIA PROCEDURE NOTES
Spinal Block    End time: 4/29/2025 9:04 AM  Reason for block: primary anesthetic and at surgeon's request  Staffing  Performed: resident/CRNA   Anesthesiologist: Kem Mckoy MD  Resident/CRNA: Kahlil Walsh APRN - CRNA  Performed by: Kahlil Walsh APRN - CRNA  Authorized by: Kem Mckoy MD    Spinal Block  Patient position: sitting  Prep: Betadine  Patient monitoring: frequent blood pressure checks, continuous pulse ox and cardiac monitor  Approach: midline  Location: L3/L4  Guidance: paresthesia technique  Provider prep: mask, sterile gloves and sterile gown  Local infiltration: lidocaine  Needle  Needle type: Toi   Needle gauge: 22 G  Needle length: 3.5 in  Expiration date: 6/30/2026  Assessment  Sensory level: T6  Events: cerebrospinal fluid  Swirl obtained: Yes  CSF: clear  Attempts: 1  Hemodynamics: stable  Preanesthetic Checklist  Completed: patient identified, IV checked, site marked, risks and benefits discussed, surgical/procedural consents, equipment checked, pre-op evaluation, timeout performed, anesthesia consent given, oxygen available, monitors applied/VS acknowledged, fire risk safety assessment completed and verbalized and blood product R/B/A discussed and consented

## 2025-04-29 NOTE — ANESTHESIA POSTPROCEDURE EVALUATION
Department of Anesthesiology  Postprocedure Note    Patient: Cong Cui  MRN: [id number]  YOB: 1955  Date of evaluation: 4/29/2025    Procedure Summary       Date: 04/29/25 Room / Location: [address]    Anesthesia Start: 0857 Anesthesia Stop: 1044    Procedure: KNEE TOTAL ARTHROPLASTY LEFT (Left: Knee) Diagnosis:       Primary osteoarthritis of left knee      (Primary osteoarthritis of left knee [M17.12])    Surgeons: Armaan Henderson MD Responsible Provider: Kem Mckoy MD    Anesthesia Type: Spinal ASA Status: 3            Anesthesia Type: Spinal    Nelosn Phase I: Nelson Score: 8    Nelson Phase II:      Anesthesia Post Evaluation    Comments: POST- ANESTHESIA EVALUATION       Pt Name: Cong Cui  MRN: [id number]  YOB: 1955  Date of evaluation: 4/29/2025  Time:  12:16 PM      /62   Pulse 89   Temp 96.9 °F (36.1 °C)   Resp 17   Ht 1.727 m (5' 8\")   Wt 110.2 kg (243 lb)   SpO2 95%   BMI 36.95 kg/m²      Consciousness Level  Awake  Cardiopulmonary Status  Stable  Pain Adequately Treated YES  Nausea / Vomiting  NO  Adequate Hydration  YES  Anesthesia Related Complications NONE      Electronically signed by Kem Mckoy MD on 4/29/2025 at 12:16 PM           No notable events documented.

## 2025-04-29 NOTE — CARE COORDINATION
Case Management Assessment  Initial Evaluation    Date/Time of Evaluation: 4/29/2025 1:10 PM  Assessment Completed by: Luna Lepe RN    If patient is discharged prior to next notation, then this note serves as note for discharge by case management.    Patient Name: Cong Cui                   YOB: 1955  Diagnosis: Primary osteoarthritis of left knee [M17.12]                   Date / Time: 4/29/2025  7:08 AM    Patient Admission Status: Outpatient in a bed   Readmission Risk (Low < 19, Mod (19-27), High > 27): No data recorded  Current PCP: Mignon Figueroa MD  PCP verified by CM? Yes    Chart Reviewed: Yes      History Provided by: Patient, Medical Record, Spouse  Patient Orientation: Alert and Oriented    Patient Cognition: Alert    Hospitalization in the last 30 days (Readmission):  No    If yes, Readmission Assessment in  Navigator will be completed.    Advance Directives:      Code Status: Full Code   Patient's Primary Decision Maker is: Legal Next of Kin    Primary Decision Maker: Louise Cui - Spouse - [phone]    Primary Decision Maker: Dakota Cui - Child - [phone]    Discharge Planning:    Patient lives with: Spouse/Significant Other Type of Home: House  Primary Care Giver: Self  Patient Support Systems include: Spouse/Significant Other, Children, Family Members   Current Financial resources: Medicare  Current community resources: None  Current services prior to admission: Durable Medical Equipment            Current DME: Walker, Bipap, Shower Chair            Type of Home Care services:  Nursing Services, OT, PT    ADLS  Prior functional level: Independent in ADLs/IADLs  Current functional level: Assistance with the following:, Mobility, Bathing, Shopping, Housework, Cooking    PT AM-PAC:   /24  OT AM-PAC:   /24    Family can provide assistance at DC: Yes  Would you like Case Management to discuss the discharge plan with any other family 
Continuity of Care Form    Patient Name: Cong Cui   :  1955  MRN:  [id number]    Admit date:  2025  Discharge date:  25    Code Status Order: Full Code   Advance Directives:     Admitting Physician:  Armaan Henderson MD  PCP: Mignon Figueroa MD    Discharging Nurse: RICKY Real  Discharging Hospital Unit/Room#: [facility] OR Pool/NONE  Discharging Unit Phone Number: [phone]    Emergency Contact:   Extended Emergency Contact Information  Primary Emergency Contact: Louise Cui  Address: [address] [place] [address] [facility]  Home Phone: [phone]  Mobile Phone: [phone]  Relation: Spouse  Secondary Emergency Contact: Dakota Cui  Address: [place] [address]  Home Phone: [phone]  Mobile Phone: [phone]  Relation: Child    Past Surgical History:  Past Surgical History:   Procedure Laterality Date    ANKLE ARTHROSCOPY Right 2017    WITH DEBRIDEMENT     ANKLE ARTHROSCOPY Right 2017    ANKLE ARTHROSCOPY WITH DEBRIDEMENT  performed by [facility] MD at [facility] OR    ANKLE SURGERY Right 2020    Right foot subtalar fusion,Right percutaneous tendoachilles lengthening, performed through separate incision performed by Rony Borrego MD at [facility] OR    CARDIOVERSION  2012 ?    X2, [facility]    COLONOSCOPY      CORNEAL TRANSPLANT Right multiple    corneal transplant x3    CYSTOSCOPY      TURBT 2010    ELBOW SURGERY Right 1967    FOOT SURGERY Right 2016    remove hardware--2 screws    PACEMAKER PLACEMENT  2016    [employer], [phone], Dr. Pascual Dhaliwal    TOTAL KNEE ARTHROPLASTY Right     VASECTOMY         Immunization History:   Immunization History   Administered Date(s) Administered    COVID-19, MODERNA, , (age 12y+), IM, 50mcg/0.5mL 2023    COVID-19, PFIZER Bivalent, DO NOT Dilute, (age 12y+), IM, 30 mcg/0.3 mL 2023    COVID-19, PFIZER GRAY top, DO NOT 
fiber & water intake and take a stool softener.)   Slight warmth of operative site is normal and will diminish with time.    Fatigue and moderate pain after therapy is normal and will improve with time.    Numbness near the incision site is normal and will improve with time.   NOTE: Ensure/Remind patient to go to their follow-up appointment with their orthopedic surgeon, which is scheduled: 5/14/25 at 10:40 AM    Abnormal Conditions - When to call the Surgeon:  Increasing/excessive redness, warmth or swelling at the incisional site not relieved with ice and elevation.  Increasing/excessive pain not well-controlled by prescribed medications.  Drainage or odor from or around the incision site.  (A little blood showing through the bandage is ok, but active leaking, of any color, coming out of the bandage is NOT normal.)  Temperature above 101 degrees.  (A mild temp of 99 - 100 is normal - if temp gets to 101 you may use Tylenol once - if it does not improve, you may use a 2nd dose of Tylenol after 5 hours - if temperature is still at or above 101 degrees then call the surgeon.)  Calf tenderness, swelling, or redness or numbness of the foot/lower leg.  Shortness of breath or chest pain.  Any other incision or surgical-related concerns.  CALL SURGEON with concerns PRIOR TO sending patient to hospital.     Patient's personal belongings (please select all that are sent with patient):      RN SIGNATURE:  Electronically signed by Luna Lepe RN on 4/29/25 at 2:46 PM EDT    CASE MANAGEMENT/SOCIAL WORK SECTION    Inpatient Status Date:     Readmission Risk Assessment Score:  [facility] RISK OF UNPLANNED READMISSION 2.0             0 Total Score        Discharging to Facility/ Agency   Name: EMIR VELOZ  P: [phone]  F: [phone]    Dialysis Facility (if applicable)   Name:  Address:  Dialysis Schedule:  Phone:  Fax:    / signature: Electronically signed by Luna Lepe RN on 4/29/25 at 12:58

## 2025-05-09 ENCOUNTER — TELEPHONE (OUTPATIENT)
Dept: ORTHOPEDIC SURGERY | Age: 70
End: 2025-05-09

## 2025-05-09 NOTE — TELEPHONE ENCOUNTER
Spoke with patient regarding PT script. Patient does not have his 1st post op appointment since his total knee replacement until next week. That appointment is when it will be decided whether or not the patient should transition to outpatient physical therapy. Patient was advised to continue with home physical therapy at this time. Patient voiced understanding.

## 2025-05-09 NOTE — TELEPHONE ENCOUNTER
Patient called to get order for physical therapy faxed to [facility] ([facility]) patient didn't have fax #  Please advise  Thank you

## 2025-05-14 ENCOUNTER — OFFICE VISIT (OUTPATIENT)
Dept: ORTHOPEDIC SURGERY | Age: 70
End: 2025-05-14

## 2025-05-14 VITALS — RESPIRATION RATE: 14 BRPM | BODY MASS INDEX: 36.83 KG/M2 | WEIGHT: 243 LBS | HEIGHT: 68 IN

## 2025-05-14 DIAGNOSIS — Z96.652 S/P TOTAL KNEE ARTHROPLASTY, LEFT: Primary | ICD-10-CM

## 2025-05-14 PROCEDURE — 99024 POSTOP FOLLOW-UP VISIT: CPT | Performed by: ORTHOPAEDIC SURGERY

## 2025-05-14 NOTE — PROGRESS NOTES
[facility]                   Armaan Henderson M.D.            [address], [address]               [place], [place] [address]           Dept Phone: [phone]           Dept Fax:  [phone] [address] [facility]                       [address]           [place], [place] [address]          Dept Phone: [phone]           Dept Fax:  [phone]      Chief Compliant:  Chief Complaint   Patient presents with    Knee Pain     L TKA 4/29/25        History of Present Illness:  Patient returns today status post left TKA times 2 weeks. Patient has no major complaints     Review of Systems   Constitutional: Negative for fever, chills, sweats, recent injury, recent illness  Neurological: Negative for Headaches, numbness, or weakness.    Integumentary: Negative for rash, itching, ecchymosis, or wounds.   Musculoskeletal: Positive for Knee Pain (L TKA 4/29/25)       Physical Exam:  Constitutional: Patient is oriented to person, place, and time. Patient appears well-developed and well nourished.   Musculoskeletal: Normal gait. Motion 0-100 degrees with expected pain with ROM. No Calf tenderness, Negative Urmila's sign. Neurovascular intact.  Neurological: Patient is alert and oriented to person, place, and time. Normal strenght. No sensory deficit.  Skin: Skin is warm and dry. Incision is healing well without signs of redness or drainage  Nursing note and vitals reviewed.     Labs and Imaging:     XR taken today:  XR KNEE LEFT (1-2 VIEWS)  Result Date: 5/14/2025  X-rays taken today reviewed by me show standing AP of both knees and a lateral left knee.  Patient is status post recent left total knee arthroplasty.  Components are in good alignment position on AP and lateral views without any periprosthetic complications.  Patellas at appropriate height.  Patient also has a history of a right total knee arthroplasty.  This looks to be in good alignment position he has a stemmed

## 2025-06-25 ENCOUNTER — OFFICE VISIT (OUTPATIENT)
Dept: ORTHOPEDIC SURGERY | Age: 70
End: 2025-06-25

## 2025-06-25 VITALS — RESPIRATION RATE: 18 BRPM | BODY MASS INDEX: 36.83 KG/M2 | WEIGHT: 243 LBS | HEIGHT: 68 IN

## 2025-06-25 DIAGNOSIS — Z96.652 S/P TOTAL KNEE ARTHROPLASTY, LEFT: Primary | ICD-10-CM

## 2025-06-25 PROCEDURE — 99024 POSTOP FOLLOW-UP VISIT: CPT | Performed by: ORTHOPAEDIC SURGERY

## 2025-06-25 NOTE — PROGRESS NOTES
Bina returns today status post left total knee on 4/29/2025.  Overall he states he is doing fairly well he is still with physical therapy.    Examination notes patient incisions pristine.  Motion is is 2 to about 125 degrees good varus and valgus stability and good patellar tracking.  No calf tenderness negative Homans    No new x-rays taken today    Impression  Status post left total knee  History of right total knee    Plan  Patient is to continue with therapy and then home exercises overall doing very well we will see him back in next April for his first follow-up up/anniversary otherwise call with any problems prior to that.

## 2025-07-08 NOTE — FLOWSHEET NOTE
Discharge instructions reviewed with the patient and his wife.  All questions answered.  Personal belongings gathered by his wife.  Patient discharged home and tolerated the transfer to vehicle well.   08-Jul-2025 11:19

## 2025-07-31 DIAGNOSIS — Z12.5 PROSTATE CANCER SCREENING: Primary | ICD-10-CM

## (undated) DEVICE — ZIP 24 SURGICAL SKIN CLOSURE DEVICE: Brand: ZIP 24 SURGICAL SKIN CLOSURE DEVICE

## (undated) DEVICE — CONNEXT SURGICAL MATRIX - LARGE SYRINGE: Brand: CONNEXT SURGICAL MATRIX

## (undated) DEVICE — BLANKET WRM W29.9XL79.1IN UP BODY FORC AIR MISTRAL-AIR

## (undated) DEVICE — BNDG,ELSTC,MATRIX,STRL,6"X5YD,LF,HOOK&LP: Brand: MEDLINE

## (undated) DEVICE — C-ARMOR C-ARM EQUIPMENT COVERS CLEAR STERILE UNIVERSAL FIT 12 PER CASE: Brand: C-ARMOR

## (undated) DEVICE — 3M™ STERI-DRAPE™ U-DRAPE 1015: Brand: STERI-DRAPE™

## (undated) DEVICE — DRESSING TRNSPAR W5XL4.5IN FLM SHT SEMIPERMEABLE WIND

## (undated) DEVICE — GLOVE ORTHO 8   MSG9480

## (undated) DEVICE — STRIP WND CLSR W1 4XL4IN POLYAMIDE MACROPOROUS NONWOVEN H2O

## (undated) DEVICE — GLOVE SURG SZ 85 L12IN FNGR THK79MIL GRN LTX FREE

## (undated) DEVICE — STRAP DISTR NYL FOAM PD NONINVASIVE FOR ANK ARTHRO

## (undated) DEVICE — CHLORAPREP 26ML ORANGE

## (undated) DEVICE — DRESSING ALGINATE POST OPERATIVE 12X3.5 IN RECT PRIMASEAL

## (undated) DEVICE — Device

## (undated) DEVICE — STERLING XTRASHARP SHAVER GREAT WHITE SHAVER BLADE, 3.5 MM: Brand: STERLING XTRASHARP SHAVER GREAT WHITE

## (undated) DEVICE — 4-PORT MANIFOLD: Brand: NEPTUNE 2

## (undated) DEVICE — CANNULATED DRILL

## (undated) DEVICE — COVER,TABLE,HEAVY DUTY,50"X90",STRL: Brand: MEDLINE

## (undated) DEVICE — APPLICATOR MEDICATED 26 CC SOLUTION HI LT ORNG CHLORAPREP

## (undated) DEVICE — SOL IRR SOD CHL 0.9% TITAN XL CNTNR 3000ML

## (undated) DEVICE — SUTURE ETHLN SZ 3-0 L18IN NONABSORBABLE BLK PS-2 L19MM 3/8 1669H

## (undated) DEVICE — 10K ARTHROSCOPY INFLOW/OUTFLOW TUBE SET: Brand: 10K

## (undated) DEVICE — PAD,ABDOMINAL,5"X9",ST,LF,25/BX: Brand: MEDLINE INDUSTRIES, INC.

## (undated) DEVICE — 3M™ STERI-DRAPE™ INCISE DRAPE 1050 (60CM X 45CM): Brand: STERI-DRAPE™

## (undated) DEVICE — SUTURE VCRL SZ 0 L27IN ABSRB UD L26MM CT-2 1/2 CIR J270H

## (undated) DEVICE — DRESSING PETRO W3XL8IN OIL EMUL N ADH GZ KNIT IMPREG CELOS

## (undated) DEVICE — COOLER THER 20-31IN L CRYO COMB W/ PD KNEE TB GRAV FLO

## (undated) DEVICE — SMARTGOWN SURGICAL GOWN, 3XL, LONG: Brand: CONVERTORS

## (undated) DEVICE — GLOVE ORANGE PI 8   MSG9080

## (undated) DEVICE — GLOVE SURG SZ 65 THK91MIL LTX FREE SYN POLYISOPRENE

## (undated) DEVICE — GARMENT,MEDLINE,DVT,INT,CALF,MED, GEN2: Brand: MEDLINE

## (undated) DEVICE — YANKAUER,FLEXIBLE HANDLE,REGLR CAPACITY: Brand: MEDLINE INDUSTRIES, INC.

## (undated) DEVICE — 1016 S-DRAPE IRRIG POUCH 10/BOX: Brand: STERI-DRAPE™

## (undated) DEVICE — SMALL TEAR CROSS CUT RASP (11.0 X 5.0MM)

## (undated) DEVICE — SUTURE VCRL SZ 2-0 L27IN ABSRB UD L26MM CT-2 1/2 CIR J269H

## (undated) DEVICE — SVMMC POD PK

## (undated) DEVICE — ZIMMER® STERILE DISPOSABLE TOURNIQUET CUFF, DUAL PORT, SINGLE BLADDER, 34 IN. (86 CM)

## (undated) DEVICE — SPONGE LAP W18XL18IN WHT COT 4 PLY FLD STRUNG RADPQ DISP ST

## (undated) DEVICE — THE MILL DISPOSABLE - MEDIUM

## (undated) DEVICE — GOWN,AURORA,NONRNF,XL,30/CS: Brand: MEDLINE

## (undated) DEVICE — GAUZE,SPONGE,FLUFF,6"X6.75",STRL,5/TRAY: Brand: MEDLINE

## (undated) DEVICE — 3M™ COBAN™ NL STERILE NON-LATEX SELF-ADHERENT WRAP, 2084S, 4 IN X 5 YD (10 CM X 4,5 M), 18 ROLLS/CASE: Brand: 3M™ COBAN™

## (undated) DEVICE — INTENDED FOR TISSUE SEPARATION, AND OTHER PROCEDURES THAT REQUIRE A SHARP SURGICAL BLADE TO PUNCTURE OR CUT.: Brand: BARD-PARKER ® CARBON RIB-BACK BLADES

## (undated) DEVICE — CEMENT MIXING SYSTEM WITH FEMORAL BREAKWAY NOZZLE: Brand: REVOLUTION

## (undated) DEVICE — DRAPE,U/ SHT,SPLIT,PLAS,STERIL: Brand: MEDLINE

## (undated) DEVICE — NEEDLE SPNL L3.5IN PNK HUB S STL REG WALL FIT STYL W/ QNCKE

## (undated) DEVICE — DUAL CUT SAGITTAL BLADE

## (undated) DEVICE — KIT AUTOTRNS APPL AERO 2 SET SYR 2 TIP FOR PLT SEP SYS GPS

## (undated) DEVICE — ST. CHARLES TOTAL KNEE: Brand: MEDLINE INDUSTRIES, INC.

## (undated) DEVICE — SUTURE STRATAFIX SYMMETRIC PDS + SZ 1 L18IN ABSRB VLT L48MM SXPP1A400

## (undated) DEVICE — CANNULATED SCREW
Type: IMPLANTABLE DEVICE | Site: ANKLE | Status: NON-FUNCTIONAL
Brand: ASNIS
Removed: 2020-11-12

## (undated) DEVICE — UNTHREADED GUIDE WIRE: Brand: FIXOS

## (undated) DEVICE — ANKLE DISTRACTOR STRAP SET

## (undated) DEVICE — NDL CNTR 40CT FM MAG: Brand: MEDLINE INDUSTRIES, INC.

## (undated) DEVICE — DRAPE,REIN 53X77,STERILE: Brand: MEDLINE

## (undated) DEVICE — TOWEL,OR,DSP,ST,BLUE,DLX,XR,4/PK,20PK/CS: Brand: MEDLINE

## (undated) DEVICE — GLOVE SURG SZ 8 CRM LTX FREE POLYISOPRENE POLYMER BEAD ANTI

## (undated) DEVICE — KIT SEP W/ BLD DRAW TB SYR NDL TRNQT PD

## (undated) DEVICE — GLOVE ORANGE PI 8 1/2   MSG9085

## (undated) DEVICE — 2108 SERIES SAGITTAL BLADE FLARED, GROUND  (29.0 X 1.32 X 84.0MM)

## (undated) DEVICE — PADDING,UNDERCAST,COTTON, 4"X4YD STERILE: Brand: MEDLINE

## (undated) DEVICE — STERLING GATOR SHAVER BLADE, 2.9 MM: Brand: STERLING GATOR

## (undated) DEVICE — BANDAGE COBAN 4 IN COMPR W4INXL5YD FOAM COHESIVE QUIK STK SELF ADH SFT

## (undated) DEVICE — SUTURE VICRYL SZ 0 L36IN ABSRB UD CT-1 L36MM 1/2 CIR TAPR PNT VCP946H

## (undated) DEVICE — DRAPE C ARM UNIV W41XL74IN CLR PLAS XR VELC CLSR POLY STRP

## (undated) DEVICE — SUTURE MONOCRYL STRATAFIX SPRL + SZ 2 0 L27IN ABSRB UD W NDL SXMP1B419

## (undated) DEVICE — GOWN,AURORA,NONREINFORCED,LARGE: Brand: MEDLINE

## (undated) DEVICE — GLOVE ORANGE PI 7 1/2   MSG9075

## (undated) DEVICE — GLOVE ORANGE PI 7   MSG9070

## (undated) DEVICE — PADDING CAST W4INXL4YD ST COT COHESIVE HND TEARABLE SPEC

## (undated) DEVICE — AMBIENT SUPER TURBOVAC 90: Brand: COBLATION

## (undated) DEVICE — STERILE HOOK LOCK LATEX FREE ELASTIC BANDAGE 6INX5YD: Brand: HOOK LOCK™

## (undated) DEVICE — SKIN PREP TRAY W/CHG: Brand: MEDLINE INDUSTRIES, INC.

## (undated) DEVICE — BANDAGE COMPR W6INXL12FT SMOOTH FOR LIMB EXSANG ESMARCH

## (undated) DEVICE — 450 ML BOTTLE OF 0.05% CHLORHEXIDINE GLUCONATE IN 99.95% STERILE WATER FOR IRRIGATION, USP AND APPLICATOR.: Brand: IRRISEPT ANTIMICROBIAL WOUND LAVAGE